# Patient Record
Sex: FEMALE | Race: WHITE | HISPANIC OR LATINO | Employment: UNEMPLOYED | URBAN - METROPOLITAN AREA
[De-identification: names, ages, dates, MRNs, and addresses within clinical notes are randomized per-mention and may not be internally consistent; named-entity substitution may affect disease eponyms.]

---

## 2019-02-15 ENCOUNTER — HOSPITAL ENCOUNTER (EMERGENCY)
Facility: HOSPITAL | Age: 64
Discharge: HOME | End: 2019-02-15
Attending: EMERGENCY MEDICINE
Payer: MEDICARE

## 2019-02-15 VITALS
HEART RATE: 76 BPM | WEIGHT: 162 LBS | OXYGEN SATURATION: 96 % | BODY MASS INDEX: 26.99 KG/M2 | RESPIRATION RATE: 16 BRPM | SYSTOLIC BLOOD PRESSURE: 122 MMHG | DIASTOLIC BLOOD PRESSURE: 80 MMHG | TEMPERATURE: 98 F | HEIGHT: 65 IN

## 2019-02-15 DIAGNOSIS — N30.01 ACUTE CYSTITIS WITH HEMATURIA: Primary | ICD-10-CM

## 2019-02-15 LAB
BACTERIA #/AREA URNS AUTO: ABNORMAL /HPF
BILIRUB UR QL STRIP: NEGATIVE
CLARITY UR REFRACT.AUTO: CLEAR
COLOR UR AUTO: YELLOW
GLUCOSE UR QL STRIP: NEGATIVE
HGB UR QL STRIP: ABNORMAL
KETONES UR QL STRIP: NEGATIVE
LEUKOCYTE ESTERASE UR QL STRIP: ABNORMAL
MICROSCOPIC COMMENT: ABNORMAL
NITRITE UR QL STRIP: NEGATIVE
PH UR STRIP: 6 [PH] (ref 5–8)
PROT UR QL STRIP: NEGATIVE
RBC #/AREA URNS AUTO: 9 /HPF (ref 0–4)
SP GR UR STRIP: 1 (ref 1–1.03)
SQUAMOUS #/AREA URNS AUTO: 0 /HPF
URN SPEC COLLECT METH UR: ABNORMAL
WBC #/AREA URNS AUTO: 33 /HPF (ref 0–5)

## 2019-02-15 PROCEDURE — 99284 EMERGENCY DEPT VISIT MOD MDM: CPT | Mod: ,,, | Performed by: PHYSICIAN ASSISTANT

## 2019-02-15 PROCEDURE — 99284 PR EMERGENCY DEPT VISIT,LEVEL IV: ICD-10-PCS | Mod: ,,, | Performed by: PHYSICIAN ASSISTANT

## 2019-02-15 PROCEDURE — 81001 URINALYSIS AUTO W/SCOPE: CPT

## 2019-02-15 PROCEDURE — 99284 EMERGENCY DEPT VISIT MOD MDM: CPT

## 2019-02-15 PROCEDURE — 87086 URINE CULTURE/COLONY COUNT: CPT

## 2019-02-15 PROCEDURE — 25000003 PHARM REV CODE 250: Performed by: PHYSICIAN ASSISTANT

## 2019-02-15 RX ORDER — CEPHALEXIN 500 MG/1
500 CAPSULE ORAL
Status: COMPLETED | OUTPATIENT
Start: 2019-02-15 | End: 2019-02-15

## 2019-02-15 RX ORDER — CEPHALEXIN 500 MG/1
500 CAPSULE ORAL EVERY 12 HOURS
Qty: 14 CAPSULE | Refills: 0 | Status: SHIPPED | OUTPATIENT
Start: 2019-02-15 | End: 2019-02-22

## 2019-02-15 RX ORDER — PHENAZOPYRIDINE HYDROCHLORIDE 200 MG/1
200 TABLET, FILM COATED ORAL 3 TIMES DAILY
Qty: 21 TABLET | Refills: 0 | Status: SHIPPED | OUTPATIENT
Start: 2019-02-15 | End: 2019-02-22

## 2019-02-15 RX ADMIN — CEPHALEXIN 500 MG: 500 CAPSULE ORAL at 10:02

## 2019-02-16 NOTE — ED TRIAGE NOTES
Celina Azevedo, a 63 y.o. female presents to the ED w/ complaint of dysuria and hematuria that started this afternoon. Pt states she has a hx of dysuria but never really goes to the doctor for it. Pt also complains of pelvic pain and pain in the back medially. Pt denies any fever or chills.     Triage note:  Chief Complaint   Patient presents with    Dysuria     C/o burning with urination and some blood in urine x 1 day. C/o pelvic pain and midline lower back pain     Review of patient's allergies indicates:  No Known Allergies  No past medical history on file.

## 2019-02-16 NOTE — ED PROVIDER NOTES
Encounter Date: 2/15/2019       History     Chief Complaint   Patient presents with    Dysuria     C/o burning with urination and some blood in urine x 1 day. C/o pelvic pain and midline lower back pain     63-year-old female with no significant past medical history presents to the ED with complaints of dysuria.  She reports burning with urination as well as a small amount of blood in her urine since this morning.  Patient's daughter notes that the patient has complained of burning with urination for a prolonged amount of time, but has not been evaluated by healthcare provider.  Patient reports associated pelvic pain and low back pain. She denies nausea, vomiting, fever, chills, or other acute complaints.           Review of patient's allergies indicates:  No Known Allergies  History reviewed. No pertinent past medical history.  Past Surgical History:   Procedure Laterality Date    HYSTERECTOMY       History reviewed. No pertinent family history.  Social History     Tobacco Use    Smoking status: Never Smoker    Smokeless tobacco: Never Used   Substance Use Topics    Alcohol use: No     Frequency: Never    Drug use: No     Review of Systems   Constitutional: Negative for fever.   HENT: Negative for sore throat.    Respiratory: Negative for shortness of breath.    Cardiovascular: Negative for chest pain.   Gastrointestinal: Positive for abdominal pain. Negative for nausea.   Genitourinary: Positive for dysuria. Negative for flank pain.   Musculoskeletal: Positive for back pain.   Skin: Negative for rash.   Neurological: Negative for weakness.   Hematological: Does not bruise/bleed easily.       Physical Exam     Initial Vitals [02/15/19 2023]   BP Pulse Resp Temp SpO2   122/80 76 16 98.3 °F (36.8 °C) 96 %      MAP       --         Physical Exam    Nursing note and vitals reviewed.  Constitutional: She appears well-developed and well-nourished. She is not diaphoretic.  Non-toxic appearance. She does not appear  ill. No distress.   HENT:   Head: Normocephalic and atraumatic.   Neck: Neck supple.   Cardiovascular: Normal rate and regular rhythm. Exam reveals no gallop and no friction rub.    No murmur heard.  Pulmonary/Chest: Effort normal and breath sounds normal. No accessory muscle usage. No tachypnea. No respiratory distress. She has no decreased breath sounds. She has no wheezes. She has no rhonchi. She has no rales.   Abdominal: Soft. She exhibits no distension. There is tenderness in the suprapubic area. There is no CVA tenderness.   Neurological: She is alert.   Skin: No rash noted.   Psychiatric: She has a normal mood and affect. Her behavior is normal.         ED Course   Procedures  Labs Reviewed   URINALYSIS, REFLEX TO URINE CULTURE - Abnormal; Notable for the following components:       Result Value    Specific Gravity, UA 1.000 (*)     Occult Blood UA 3+ (*)     Leukocytes, UA 3+ (*)     All other components within normal limits    Narrative:     Preferred Collection Type->Urine, Clean Catch  yellow and grey   URINALYSIS MICROSCOPIC - Abnormal; Notable for the following components:    RBC, UA 9 (*)     WBC, UA 33 (*)     Bacteria, UA Few (*)     All other components within normal limits    Narrative:     Preferred Collection Type->Urine, Clean Catch  yellow and grey   CULTURE, URINE          Imaging Results    None          Medical Decision Making:   History:   Old Medical Records: I decided to obtain old medical records.  Differential Diagnosis:   My differential diagnosis includes but is not limited to:  Acute Cystitis, interstitial cystitis, pyelonephritis, vaginitis  Clinical Tests:   Lab Tests: Ordered and Reviewed       APC / Resident Notes:   63-year-old female presents with dysuria and hematuria that began today.  Afebrile.  Nontoxic appearing.  Suprapubic tenderness. No CVA tenderness.     UA consistent with infection.  I will treat with Keflex.  First dose given in the ED.  Will also prescribe  Pyridium.  Stable for discharge. Will refer to Internal Medicine for follow-up.                    Clinical Impression:   The encounter diagnosis was Acute cystitis with hematuria.      Disposition:   Disposition: Discharged  Condition: Stable                        Geneva Chavez PA-C  02/16/19 0105

## 2019-02-17 LAB
BACTERIA UR CULT: NORMAL
BACTERIA UR CULT: NORMAL

## 2019-08-07 ENCOUNTER — HOSPITAL ENCOUNTER (EMERGENCY)
Facility: HOSPITAL | Age: 64
Discharge: HOME | End: 2019-08-07
Attending: EMERGENCY MEDICINE
Payer: MEDICARE

## 2019-08-07 VITALS
TEMPERATURE: 98 F | DIASTOLIC BLOOD PRESSURE: 74 MMHG | RESPIRATION RATE: 16 BRPM | BODY MASS INDEX: 27.82 KG/M2 | OXYGEN SATURATION: 99 % | HEIGHT: 65 IN | WEIGHT: 167 LBS | SYSTOLIC BLOOD PRESSURE: 167 MMHG | HEART RATE: 71 BPM

## 2019-08-07 DIAGNOSIS — N39.0 URINARY TRACT INFECTION WITH HEMATURIA, SITE UNSPECIFIED: Primary | ICD-10-CM

## 2019-08-07 DIAGNOSIS — R31.9 URINARY TRACT INFECTION WITH HEMATURIA, SITE UNSPECIFIED: Primary | ICD-10-CM

## 2019-08-07 LAB
BACTERIA #/AREA URNS AUTO: NORMAL /HPF
BILIRUB UR QL STRIP: NEGATIVE
CLARITY UR REFRACT.AUTO: CLEAR
COLOR UR AUTO: ABNORMAL
GLUCOSE UR QL STRIP: NEGATIVE
HGB UR QL STRIP: ABNORMAL
KETONES UR QL STRIP: NEGATIVE
LEUKOCYTE ESTERASE UR QL STRIP: ABNORMAL
MICROSCOPIC COMMENT: NORMAL
NITRITE UR QL STRIP: NEGATIVE
PH UR STRIP: 6 [PH] (ref 5–8)
PROT UR QL STRIP: NEGATIVE
RBC #/AREA URNS AUTO: 1 /HPF (ref 0–4)
SP GR UR STRIP: 1 (ref 1–1.03)
URN SPEC COLLECT METH UR: ABNORMAL
WBC #/AREA URNS AUTO: 5 /HPF (ref 0–5)

## 2019-08-07 PROCEDURE — 25000003 PHARM REV CODE 250: Mod: HCNC | Performed by: PHYSICIAN ASSISTANT

## 2019-08-07 PROCEDURE — 99284 EMERGENCY DEPT VISIT MOD MDM: CPT | Mod: ,,, | Performed by: EMERGENCY MEDICINE

## 2019-08-07 PROCEDURE — 81001 URINALYSIS AUTO W/SCOPE: CPT | Mod: HCNC

## 2019-08-07 PROCEDURE — 99284 PR EMERGENCY DEPT VISIT,LEVEL IV: ICD-10-PCS | Mod: ,,, | Performed by: EMERGENCY MEDICINE

## 2019-08-07 PROCEDURE — 99283 EMERGENCY DEPT VISIT LOW MDM: CPT | Mod: HCNC

## 2019-08-07 RX ORDER — CEPHALEXIN 500 MG/1
500 CAPSULE ORAL 2 TIMES DAILY
Qty: 14 CAPSULE | Refills: 0 | Status: SHIPPED | OUTPATIENT
Start: 2019-08-07 | End: 2019-08-14

## 2019-08-07 RX ORDER — CEPHALEXIN 500 MG/1
500 CAPSULE ORAL
Status: COMPLETED | OUTPATIENT
Start: 2019-08-07 | End: 2019-08-07

## 2019-08-07 RX ADMIN — CEPHALEXIN 500 MG: 500 CAPSULE ORAL at 05:08

## 2019-08-07 NOTE — ED NOTES
Patient identifiers verified and correct for Ms Azevedo  C/C: Pain and burning with urination SEE NN  APPEARANCE: awake and alert in NAD.  SKIN: warm, dry and intact. No breakdown or bruising.  MUSCULOSKELETAL: Patient moving all extremities spontaneously, no obvious swelling or deformities noted. Ambulates independently.  RESPIRATORY: Denies shortness of breath.Respirations unlabored. Denies fevers  CARDIAC: Denies CP, 2+ distal pulses; no peripheral edema  ABDOMEN: S/ND/NT, Denies nausea  : voids spontaneously, pain and burning with uriantion  Neurologic: AAO x 4; follows commands equal strength in all extremities; denies numbness/tingling. Denies dizziness Denies weakness

## 2019-08-07 NOTE — ED PROVIDER NOTES
Encounter Date: 8/7/2019    SCRIBE #1 NOTE: I, Maira Pina, am scribing for, and in the presence of,  Dagmar Jin MD. I have scribed the following portions of the note - the APC attestation.       History     Chief Complaint   Patient presents with    Female  Problem     Pt c/o burning with urination.      63-year-old female with no pertinent PMHx who presents to the ED with c/o dysuria. She reports that since this morning, she has had burning when she urinates, urinary frequency, and slight bright red blood in her urine. She also has suprapubic pain, which she describes as 9/10 when urinating. She states that her symptoms are similar to when she was diagnosed with a UTI in February, for which she was prescribed Keflex with resolution of her symptoms. Denies fevers, chills, vaginal discharge/itching/pain, flank pain, n/v, chest pain, SOB, or any other medical complaints. Denies history of kidney stones.     The history is provided by the patient. The history is limited by a language barrier ([place]ian speaking). A  was used.     Review of patient's allergies indicates:  No Known Allergies  History reviewed. No pertinent past medical history.  Past Surgical History:   Procedure Laterality Date    HYSTERECTOMY       History reviewed. No pertinent family history.  Social History     Tobacco Use    Smoking status: Never Smoker    Smokeless tobacco: Never Used   Substance Use Topics    Alcohol use: No     Frequency: Never    Drug use: No     Review of Systems   Constitutional: Negative for chills, fatigue and fever.   HENT: Negative for congestion.    Eyes: Negative for visual disturbance.   Respiratory: Negative for shortness of breath.    Cardiovascular: Negative for chest pain and palpitations.   Gastrointestinal: Negative for abdominal pain, constipation, diarrhea, nausea and vomiting.   Genitourinary: Positive for dysuria, frequency, hematuria and pelvic pain (suprapubic). Negative  for flank pain, vaginal bleeding, vaginal discharge and vaginal pain.   Musculoskeletal: Negative for back pain and neck pain.   Skin: Negative for rash.   Neurological: Negative for dizziness, weakness, numbness and headaches.   Hematological: Does not bruise/bleed easily.   Psychiatric/Behavioral: Negative for confusion.       Physical Exam     Initial Vitals [08/07/19 1403]   BP Pulse Resp Temp SpO2   (!) 151/80 80 16 99.6 °F (37.6 °C) 98 %      MAP       --         Physical Exam    Nursing note and vitals reviewed.  Constitutional: She appears well-developed and well-nourished.   HENT:   Head: Normocephalic and atraumatic.   Eyes: Conjunctivae and EOM are normal.   Neck: Normal range of motion.   Cardiovascular: Normal rate, regular rhythm and normal heart sounds.   Pulmonary/Chest: Breath sounds normal. She has no wheezes. She has no rhonchi. She has no rales.   Abdominal: Soft. There is tenderness. There is no rebound and no guarding.   Mild suprapubic tenderness to palpation. No CVA tenderness.    Musculoskeletal: Normal range of motion. She exhibits no edema or tenderness.   Neurological: She is alert and oriented to person, place, and time.   Skin: Skin is warm.   Psychiatric: She has a normal mood and affect.         ED Course   Procedures  Labs Reviewed   URINALYSIS, REFLEX TO URINE CULTURE - Abnormal; Notable for the following components:       Result Value    Specific Gravity, UA 1.000 (*)     Occult Blood UA 3+ (*)     Leukocytes, UA 3+ (*)     All other components within normal limits    Narrative:     Preferred Collection Type->Urine, Clean Catch   URINALYSIS MICROSCOPIC    Narrative:     Preferred Collection Type->Urine, Clean Catch          Imaging Results    None          Medical Decision Making:   History:   Old Medical Records: I decided to obtain old medical records.  Old Records Summarized: records from clinic visits.       <> Summary of Records: Pt seen in ED in Feb 2019 treated for UTI with  keflex. Urine cx positive for multiple organisms, none in predominance.   Initial Assessment:   63-year-old female with no pertinent PMHx who presents to the ED with c/o dysuria, urinary frequency, and hematuria. Symptoms similar to previous UTI. Afebrile. Slightly hypertensive with /80. RRR. Lungs CTA bilaterally. Mild suprapubic tenderness to palpation. No CVA tenderness. Neurovascularly intact throughout.   Clinical Tests:   Lab Tests: Ordered and Reviewed  ED Management:  I suspect patient has a recurrent UTI, will check UA. I do not suspect urosepsis or nephrolithiasis as patient is no acute distress, vital signs are stable, and she has no CVA tenderness on exam.     UA with 3+ occult blood, 3+ leukocytes, and negative nitrites. Microscopy with 5 WBC, 1 RBC, and rare bacteria.     Reviewed results with patient. Will treated UTI with Keflex as patient had a good response to this medication in the past. Pt is not currently followed by a primary care physician, will place ambulatory referral to [facility] (preferabbly [place] speaking). Strict ER return precautions given. All questions answered. Pt expressed understanding and is agreeable with the plan.     I have discussed the treatment and management of this patient with my supervising physician, and we agree on the plan of care.      Brenda Dobbs PA-C              Scribe Attestation:   Scribe #1: I performed the above scribed service and the documentation accurately describes the services I performed. I attest to the accuracy of the note.    Attending Attestation:     Physician Attestation Statement for NP/PA:   I discussed this assessment and plan of this patient with the NP/PA, but I did not personally examine the patient. The face to face encounter was performed by the NP/PA.                     Clinical Impression:       ICD-10-CM ICD-9-CM   1. Urinary tract infection with hematuria, site unspecified N39.0 599.0    R31.9 599.70         Disposition:    Disposition: Discharged  Condition: Stable                        Brenda Dobbs PA-C  08/08/19 1200

## 2019-08-07 NOTE — DISCHARGE INSTRUCTIONS
You had early signs of infection in your urine. You should take Keflex for treatment. Please establish care with a primary care physician to have this followed. Return if your symptoms worsen or if you develop any other concerning symptoms.

## 2019-09-24 ENCOUNTER — HOSPITAL ENCOUNTER (EMERGENCY)
Facility: HOSPITAL | Age: 64
Discharge: HOME | End: 2019-09-24
Attending: EMERGENCY MEDICINE
Payer: MEDICARE

## 2019-09-24 VITALS
RESPIRATION RATE: 18 BRPM | SYSTOLIC BLOOD PRESSURE: 149 MMHG | OXYGEN SATURATION: 98 % | BODY MASS INDEX: 27.99 KG/M2 | TEMPERATURE: 98 F | HEIGHT: 65 IN | HEART RATE: 72 BPM | DIASTOLIC BLOOD PRESSURE: 71 MMHG | WEIGHT: 168 LBS

## 2019-09-24 DIAGNOSIS — R19.7 DIARRHEA, UNSPECIFIED TYPE: Primary | ICD-10-CM

## 2019-09-24 PROCEDURE — 99284 EMERGENCY DEPT VISIT MOD MDM: CPT | Mod: HCNC,,, | Performed by: EMERGENCY MEDICINE

## 2019-09-24 PROCEDURE — 99284 PR EMERGENCY DEPT VISIT,LEVEL IV: ICD-10-PCS | Mod: HCNC,,, | Performed by: EMERGENCY MEDICINE

## 2019-09-24 PROCEDURE — 99283 EMERGENCY DEPT VISIT LOW MDM: CPT | Mod: HCNC

## 2019-09-24 RX ORDER — CIPROFLOXACIN 500 MG/1
500 TABLET ORAL 2 TIMES DAILY
Qty: 6 TABLET | Refills: 0 | Status: SHIPPED | OUTPATIENT
Start: 2019-09-24 | End: 2019-09-27

## 2019-09-24 NOTE — ED NOTES
Patient identifiers verified and correct for Ms Azevedo  C/C: Diarrhea SEE NN  APPEARANCE: awake and alert in NAD.  SKIN: warm, dry and intact. No breakdown or bruising.  MUSCULOSKELETAL: Patient moving all extremities spontaneously, no obvious swelling or deformities noted. Ambulates independently.  RESPIRATORY: Denies shortness of breath.Respirations unlabored. Denies fevers  CARDIAC: Denies CP, 2+ distal pulses; no peripheral edema  ABDOMEN: Abdomen soft, positive diarrhea, denies nausea, denies vomiting, positive diarrhea Denies blood in stool  : voids spontaneously, denies difficulty  Neurologic: AAO x 4; follows commands equal strength in all extremities; denies numbness/tingling. Denies dizziness Denies weakness

## 2019-09-24 NOTE — ED PROVIDER NOTES
Encounter Date: 2019       History     Chief Complaint   Patient presents with    Diarrhea     4d after eating chicken salad     63-year-old healthy female complaining of diarrhea x4 days, 3 to 4 times a day, watery, nonbloody.  Symptoms started after eating chicken salad that day throat was not  and did not taste funny.  No recent travel, antibiotics, sick contacts.  No fevers, chills, vomiting. No abdominal pain.    The history is provided by the patient.     Review of patient's allergies indicates:  No Known Allergies  History reviewed. No pertinent past medical history.  Past Surgical History:   Procedure Laterality Date    HYSTERECTOMY       History reviewed. No pertinent family history.  Social History     Tobacco Use    Smoking status: Never Smoker    Smokeless tobacco: Never Used   Substance Use Topics    Alcohol use: No     Frequency: Never    Drug use: No     Review of Systems   Constitutional: Positive for fatigue. Negative for chills and fever.   Gastrointestinal: Positive for diarrhea. Negative for abdominal pain and blood in stool.       Physical Exam     Initial Vitals [19 1046]   BP Pulse Resp Temp SpO2   (!) 149/71 72 18 97.9 °F (36.6 °C) 98 %      MAP       --         Physical Exam    Nursing note and vitals reviewed.  Constitutional: She appears well-developed and well-nourished. She is not diaphoretic. No distress.   HENT:   Mouth/Throat: Oropharynx is clear and moist.   Eyes: Conjunctivae are normal.   Neck: Neck supple.   Pulmonary/Chest: No respiratory distress.   Abdominal: Soft. She exhibits no distension. There is no tenderness. There is no rebound and no guarding.   Neurological: She is alert.   Skin: Skin is warm and dry. No pallor.         ED Course   Procedures  Labs Reviewed - No data to display       Imaging Results    None          Medical Decision Making:   Initial Assessment:   63-year-old healthy female here with diarrhea x4 days    She is well-appearing  with normal vital signs and her mucous membranes are moist  Abdomen is completely benign  Differential Diagnosis:   There are no red flags to add suggest an infectious diarrhea at this time.   is here with the same symptoms that started 3 days after the patient's which strongly suggests a viral etiology.  Patient appears very well hydrated and is still able to tolerate p.o. liquids though she has decreased appetite.  ED Management:  Long discussion with patient and her  about my suspicion that this is a viral infection and that will likely resolve with more time and that the most important thing is for them to stay well hydrated. Both are requesting antibiotic therapy and I have suggested that they be given a prescription today but hold on to it and only take it if the symptoms are persisting or worsening over the next 2-3 days.  Patient does not have a PCP and was strongly advised to get an appointment with 1.  Given the patient's overall very well appearance I do not think that lab work and/or stool cultures are necessary at this time.                      Clinical Impression:       ICD-10-CM ICD-9-CM   1. Diarrhea, unspecified type R19.7 787.91                                Fadumo Torres MD  09/25/19 0914

## 2020-10-21 ENCOUNTER — OFFICE VISIT (OUTPATIENT)
Dept: URGENT CARE | Facility: CLINIC | Age: 65
End: 2020-10-21
Payer: MEDICARE

## 2020-10-21 VITALS
HEART RATE: 71 BPM | RESPIRATION RATE: 18 BRPM | SYSTOLIC BLOOD PRESSURE: 156 MMHG | HEIGHT: 65 IN | WEIGHT: 168 LBS | DIASTOLIC BLOOD PRESSURE: 84 MMHG | OXYGEN SATURATION: 98 % | BODY MASS INDEX: 27.99 KG/M2 | TEMPERATURE: 98 F

## 2020-10-21 DIAGNOSIS — S83.92XA SPRAIN OF LEFT KNEE, UNSPECIFIED LIGAMENT, INITIAL ENCOUNTER: Primary | ICD-10-CM

## 2020-10-21 PROCEDURE — 73562 XR KNEE 3 VIEW LEFT: ICD-10-PCS | Mod: FY,LT,S$GLB, | Performed by: RADIOLOGY

## 2020-10-21 PROCEDURE — 99203 PR OFFICE/OUTPT VISIT, NEW, LEVL III, 30-44 MIN: ICD-10-PCS | Mod: S$GLB,,, | Performed by: PHYSICIAN ASSISTANT

## 2020-10-21 PROCEDURE — 73562 X-RAY EXAM OF KNEE 3: CPT | Mod: FY,LT,S$GLB, | Performed by: RADIOLOGY

## 2020-10-21 PROCEDURE — 99203 OFFICE O/P NEW LOW 30 MIN: CPT | Mod: S$GLB,,, | Performed by: PHYSICIAN ASSISTANT

## 2020-10-21 RX ORDER — NAPROXEN 500 MG/1
500 TABLET ORAL 2 TIMES DAILY
Qty: 20 TABLET | Refills: 0 | Status: SHIPPED | OUTPATIENT
Start: 2020-10-21 | End: 2020-10-31

## 2020-10-21 NOTE — PATIENT INSTRUCTIONS
-Take naproxen as needed for pain.  Call [phone] to schedule an appointment with orthopedics. A referral has been placed in your chart.    Please follow up with your primary care provider within 2-5 days if your signs and symptoms have not resolved or worsen.     If your condition worsens or fails to improve we recommend that you receive another evaluation at the emergency room immediately or contact your primary medical clinic to discuss your concerns.   You must understand that you have received an Urgent Care treatment only and that you may be released before all of your medical problems are known or treated. You, the patient, will arrange for follow up care as instructed.         Knee Pain of Uncertain Cause    There are several common causes for knee pain. These can include:  · A sprain of the ligaments that support the joint  · An injury to the cartilage lining of the joint  · Arthritis from wear-and-tear or inflammation  There are other causes as well. There may also be swelling, reduced movement of the knee joint, and pain with walking. A definite diagnosis will still need to be made. If your symptoms do not improve, further follow-up and testing may be needed.  Home care  · Stay off the injured leg as much as possible until pain improves.  · Apply an ice pack over the injured area for 15 to 20 minutes every 3 to 6 hours. You should do this for the first 24 to 48 hours. You can make an ice pack by filling a plastic bag that seals at the top with ice cubes and then wrapping it with a thin towel. Continue to use ice packs for relief of pain and swelling as needed. As the ice melts, be careful to avoid getting your wrap, splint, or cast wet. After 48 hours, apply heat (warm shower or warm bath) for 15 to 20 minutes several times a day, or alternate ice and heat. If you have to wear a hook-and-loop knee brace, you can open it to apply the ice pack, or heat, directly to the knee. Never put ice directly on  the skin. Always wrap the ice in a towel or other type of cloth.  · You may use over-the-counter pain medicine to control pain, unless another pain medicine was prescribed. If you have chronic liver or kidney disease or ever had a stomach ulcer or GI bleeding, talk with your healthcare provider using these medicines.  · If crutches or a walker have been recommended, do not put weight on the injured leg until you can do so without pain. Check with your healthcare provider before returning to sports or full work duties.  · If you have a hook-and-loop knee brace, you can remove it to bathe and sleep, unless told otherwise.  Follow-up care  Follow up with your healthcare provider as advised. This is usually within 1-2 weeks.  If X-rays were taken, you will be told of any new findings that may affect your care.  Call 911  Call 911 if you have:  · Shortness of breath  · Chest pain  When to seek medical advice  Call your healthcare provider right away if any of these occur:  · Toes or foot becomes swollen, cold, blue, numb, or tingly  · Pain or swelling spreads over the knee or calf  · Warmth or redness appears over the knee or calf  · Other joints become painful  · Rash appears  · Fever of 100.4°F (38°C) or above lasting for 24 to 48 hours  Date Last Reviewed: 11/23/2015  © 8644-8112 [employer]. [address] [place] [address], [place] [address]. All rights reserved. This information is not intended as a substitute for professional medical care. Always follow your healthcare professional's instructions.

## 2020-10-21 NOTE — PROGRESS NOTES
"Subjective:       Patient ID: Celina Azevedo is a 64 y.o. female.    Vitals:  height is 5' 5" (1.651 m) and weight is 76.2 kg (168 lb). Her temperature is 97.5 °F (36.4 °C). Her blood pressure is 156/84 (abnormal) and her pulse is 71. Her respiration is 18 and oxygen saturation is 98%.     Chief Complaint: Knee Pain    Patient reports left knee pain for two weeks. Patient reports standing up the wrong way and twisting her knee.     Knee Pain   The incident occurred more than 1 week ago. The incident occurred at home. The pain is present in the left knee. The pain is at a severity of 8/10. The pain is moderate. The pain has been constant since onset.       Constitution: Negative for fatigue.   HENT: Negative for facial swelling and facial trauma.    Neck: Negative for neck stiffness.   Cardiovascular: Negative for chest trauma.   Eyes: Negative for eye trauma, double vision and blurred vision.   Gastrointestinal: Negative for abdominal trauma, abdominal pain and rectal bleeding.   Genitourinary: Negative for hematuria, missed menses, genital trauma and pelvic pain.   Musculoskeletal: Positive for pain. Negative for trauma, joint swelling and abnormal ROM of joint.   Skin: Negative for color change, wound, abrasion, laceration and bruising.   Neurological: Negative for dizziness, history of vertigo, light-headedness, coordination disturbances, altered mental status and loss of consciousness.   Hematologic/Lymphatic: Negative for history of bleeding disorder.   Psychiatric/Behavioral: Negative for altered mental status.       Objective:      Physical Exam   Constitutional: She is oriented to person, place, and time. She appears well-developed. She is cooperative.  Non-toxic appearance. She does not appear ill. No distress.   HENT:   Head: Normocephalic and atraumatic.   Ears:   Right Ear: External ear normal.   Left Ear: External ear normal.   Nose: Nose normal.   Mouth/Throat: Oropharynx is clear and moist.   Eyes: " Conjunctivae, EOM and lids are normal. Right eye exhibits no discharge. Left eye exhibits no discharge. No scleral icterus.   Neck: Trachea normal, normal range of motion, full passive range of motion without pain and phonation normal. Neck supple.   Cardiovascular: Normal rate, regular rhythm and normal heart sounds. Exam reveals no gallop.   No murmur heard.  Pulmonary/Chest: Effort normal and breath sounds normal. No respiratory distress. She has no decreased breath sounds. She has no wheezes. She has no rhonchi. She has no rales.   Musculoskeletal:         General: No deformity.      Left knee: She exhibits normal range of motion, no swelling, no effusion, no ecchymosis, no deformity, no laceration, no erythema, normal alignment, no LCL laxity, normal patellar mobility, no bony tenderness, normal meniscus and no MCL laxity. Tenderness found. Medial joint line tenderness noted.   Neurological: She is alert and oriented to person, place, and time. She has normal motor skills and normal sensation. Coordination normal.   Skin: Skin is warm, dry, intact, not diaphoretic and not pale. not left kneePsychiatric: Her speech is normal and behavior is normal. Judgment and thought content normal.   Nursing note and vitals reviewed.        Assessment:       1. Sprain of left knee, unspecified ligament, initial encounter        Xr Knee 3 View Left    Result Date: 10/21/2020  EXAMINATION: XR KNEE 3 VIEW LEFT CLINICAL HISTORY: Unspecified injury of left lower leg, initial encounter TECHNIQUE: AP radiograph of the bilateral knees, lateral, and Merchant views of the left knee were performed. COMPARISON: None FINDINGS: There is no acute fracture or dislocation of the left knee.  There are prominent marginal osteophytes along the lateral compartment, and to a lesser extent the medial and patellofemoral compartments.  There is a small effusion.     No acute osseous abnormality of the left knee. Electronically signed by: Guillermo  Diane Date:    10/21/2020 Time:    13:07    Plan:        used - [id number].    Sprain of left knee, unspecified ligament, initial encounter  -     XR KNEE 3 VIEW LEFT; Future; Expected date: 10/21/2020  -     naproxen (NAPROSYN) 500 MG tablet; Take 1 tablet (500 mg total) by mouth 2 (two) times daily. for 10 days  Dispense: 20 tablet; Refill: 0  -     Ambulatory referral/consult to Orthopedics      Patient Instructions   -Take naproxen as needed for pain.  Call [phone] to schedule an appointment with orthopedics. A referral has been placed in your chart.    Please follow up with your primary care provider within 2-5 days if your signs and symptoms have not resolved or worsen.     If your condition worsens or fails to improve we recommend that you receive another evaluation at the emergency room immediately or contact your primary medical clinic to discuss your concerns.   You must understand that you have received an Urgent Care treatment only and that you may be released before all of your medical problems are known or treated. You, the patient, will arrange for follow up care as instructed.         Knee Pain of Uncertain Cause    There are several common causes for knee pain. These can include:  · A sprain of the ligaments that support the joint  · An injury to the cartilage lining of the joint  · Arthritis from wear-and-tear or inflammation  There are other causes as well. There may also be swelling, reduced movement of the knee joint, and pain with walking. A definite diagnosis will still need to be made. If your symptoms do not improve, further follow-up and testing may be needed.  Home care  · Stay off the injured leg as much as possible until pain improves.  · Apply an ice pack over the injured area for 15 to 20 minutes every 3 to 6 hours. You should do this for the first 24 to 48 hours. You can make an ice pack by filling a plastic bag that seals at the top with ice cubes and then wrapping it  with a thin towel. Continue to use ice packs for relief of pain and swelling as needed. As the ice melts, be careful to avoid getting your wrap, splint, or cast wet. After 48 hours, apply heat (warm shower or warm bath) for 15 to 20 minutes several times a day, or alternate ice and heat. If you have to wear a hook-and-loop knee brace, you can open it to apply the ice pack, or heat, directly to the knee. Never put ice directly on the skin. Always wrap the ice in a towel or other type of cloth.  · You may use over-the-counter pain medicine to control pain, unless another pain medicine was prescribed. If you have chronic liver or kidney disease or ever had a stomach ulcer or GI bleeding, talk with your healthcare provider using these medicines.  · If crutches or a walker have been recommended, do not put weight on the injured leg until you can do so without pain. Check with your healthcare provider before returning to sports or full work duties.  · If you have a hook-and-loop knee brace, you can remove it to bathe and sleep, unless told otherwise.  Follow-up care  Follow up with your healthcare provider as advised. This is usually within 1-2 weeks.  If X-rays were taken, you will be told of any new findings that may affect your care.  Call 911  Call 911 if you have:  · Shortness of breath  · Chest pain  When to seek medical advice  Call your healthcare provider right away if any of these occur:  · Toes or foot becomes swollen, cold, blue, numb, or tingly  · Pain or swelling spreads over the knee or calf  · Warmth or redness appears over the knee or calf  · Other joints become painful  · Rash appears  · Fever of 100.4°F (38°C) or above lasting for 24 to 48 hours  Date Last Reviewed: 11/23/2015  © 2643-8826 [employer]. [address] [place] [address], [place] [address]. All rights reserved. This information is not intended as a substitute for professional medical care. Always follow your healthcare professional's  instructions.

## 2020-10-23 ENCOUNTER — OFFICE VISIT (OUTPATIENT)
Dept: ORTHOPEDICS | Facility: CLINIC | Age: 65
End: 2020-10-23
Payer: MEDICARE

## 2020-10-23 VITALS — WEIGHT: 167.88 LBS | HEIGHT: 65 IN | BODY MASS INDEX: 27.97 KG/M2

## 2020-10-23 DIAGNOSIS — M17.12 PRIMARY OSTEOARTHRITIS OF LEFT KNEE: Primary | ICD-10-CM

## 2020-10-23 PROCEDURE — 99203 OFFICE O/P NEW LOW 30 MIN: CPT | Mod: 25,HCNC,S$GLB, | Performed by: ORTHOPAEDIC SURGERY

## 2020-10-23 PROCEDURE — 3008F BODY MASS INDEX DOCD: CPT | Mod: HCNC,CPTII,S$GLB, | Performed by: ORTHOPAEDIC SURGERY

## 2020-10-23 PROCEDURE — 1100F PR PT FALLS ASSESS DOC 2+ FALLS/FALL W/INJURY/YR: ICD-10-PCS | Mod: HCNC,CPTII,S$GLB, | Performed by: ORTHOPAEDIC SURGERY

## 2020-10-23 PROCEDURE — 1159F PR MEDICATION LIST DOCUMENTED IN MEDICAL RECORD: ICD-10-PCS | Mod: HCNC,S$GLB,, | Performed by: ORTHOPAEDIC SURGERY

## 2020-10-23 PROCEDURE — 20610 DRAIN/INJ JOINT/BURSA W/O US: CPT | Mod: HCNC,LT,S$GLB, | Performed by: ORTHOPAEDIC SURGERY

## 2020-10-23 PROCEDURE — 99203 PR OFFICE/OUTPT VISIT, NEW, LEVL III, 30-44 MIN: ICD-10-PCS | Mod: 25,HCNC,S$GLB, | Performed by: ORTHOPAEDIC SURGERY

## 2020-10-23 PROCEDURE — 99999 PR PBB SHADOW E&M-EST. PATIENT-LVL II: ICD-10-PCS | Mod: PBBFAC,HCNC,, | Performed by: ORTHOPAEDIC SURGERY

## 2020-10-23 PROCEDURE — 1125F PR PAIN SEVERITY QUANTIFIED, PAIN PRESENT: ICD-10-PCS | Mod: HCNC,S$GLB,, | Performed by: ORTHOPAEDIC SURGERY

## 2020-10-23 PROCEDURE — 3288F PR FALLS RISK ASSESSMENT DOCUMENTED: ICD-10-PCS | Mod: HCNC,CPTII,S$GLB, | Performed by: ORTHOPAEDIC SURGERY

## 2020-10-23 PROCEDURE — 3008F PR BODY MASS INDEX (BMI) DOCUMENTED: ICD-10-PCS | Mod: HCNC,CPTII,S$GLB, | Performed by: ORTHOPAEDIC SURGERY

## 2020-10-23 PROCEDURE — 1159F MED LIST DOCD IN RCRD: CPT | Mod: HCNC,S$GLB,, | Performed by: ORTHOPAEDIC SURGERY

## 2020-10-23 PROCEDURE — 1100F PTFALLS ASSESS-DOCD GE2>/YR: CPT | Mod: HCNC,CPTII,S$GLB, | Performed by: ORTHOPAEDIC SURGERY

## 2020-10-23 PROCEDURE — 3288F FALL RISK ASSESSMENT DOCD: CPT | Mod: HCNC,CPTII,S$GLB, | Performed by: ORTHOPAEDIC SURGERY

## 2020-10-23 PROCEDURE — 99999 PR PBB SHADOW E&M-EST. PATIENT-LVL II: CPT | Mod: PBBFAC,HCNC,, | Performed by: ORTHOPAEDIC SURGERY

## 2020-10-23 PROCEDURE — 1125F AMNT PAIN NOTED PAIN PRSNT: CPT | Mod: HCNC,S$GLB,, | Performed by: ORTHOPAEDIC SURGERY

## 2020-10-23 PROCEDURE — 20610 LARGE JOINT ASPIRATION/INJECTION: L KNEE: ICD-10-PCS | Mod: HCNC,LT,S$GLB, | Performed by: ORTHOPAEDIC SURGERY

## 2020-10-23 RX ORDER — IBUPROFEN 800 MG/1
800 TABLET ORAL EVERY 6 HOURS PRN
Status: ON HOLD | COMMUNITY
End: 2022-04-01 | Stop reason: HOSPADM

## 2020-10-23 RX ADMIN — TRIAMCINOLONE ACETONIDE 40 MG: 40 INJECTION, SUSPENSION INTRA-ARTICULAR; INTRAMUSCULAR at 02:10

## 2020-10-23 NOTE — LETTER
October 24, 2020      Kamryn Braun PA-C  [address]  [facility] [address]           [facility] Orthoped[facility] 5th Fl  [address]LO[address]  [facility] [address]  Phone: [phone]          Patient: Celina Azevedo   MR Number: [id number]   YOB: 1955   Date of Visit: 10/23/2020       Dear Kamryn Braun:    Thank you for referring Celina Azevedo to me for evaluation. Attached you will find relevant portions of my assessment and plan of care.    If you have questions, please do not hesitate to call me. I look forward to following Celina Azevedo along with you.    Sincerely,    Kermit Torrez MD    Enclosure  CC:  No Recipients    If you would like to receive this communication electronically, please contact [email or web address] or [phone] to request more information on [employer] access.    For providers and/or their staff who would like to refer a patient to [facility], please contact us through our one-stop-shop provider referral line, [facility], at [phone].    If you feel you have received this communication in error or would no longer like to receive these types of communications, please e-mail [email or web address]

## 2020-10-23 NOTE — PROGRESS NOTES
Subjective:      Patient ID: Celina Azevedo is a 64 y.o. female.    Chief Complaint: Pain of the Left Knee    HPI    Celina Azevedo is a 64 y.o. female who presents to clinic for evaluation of left knee pain x 2 weeks. Pt reports 2 weeks ago she fell directly onto her left knee. Reports severe lateral knee pain and associated swelling. She has been able to bear weight on LLE since the fall. She reports similar pain ~ 8 years ago, she was given an intrarticular CSI at that time with 100% resolution. She has been taking ibuprofen with minimal relief.     History reviewed. No pertinent past medical history.  Past Surgical History:   Procedure Laterality Date    HYSTERECTOMY       History reviewed. No pertinent family history.  Social History     Socioeconomic History    Marital status:      Spouse name: Not on file    Number of children: Not on file    Years of education: Not on file    Highest education level: Not on file   Occupational History    Not on file   Social Needs    Financial resource strain: Not on file    Food insecurity     Worry: Not on file     Inability: Not on file    Transportation needs     Medical: Not on file     Non-medical: Not on file   Tobacco Use    Smoking status: Never Smoker    Smokeless tobacco: Never Used   Substance and Sexual Activity    Alcohol use: No     Frequency: Never    Drug use: No    Sexual activity: Not on file   Lifestyle    Physical activity     Days per week: Not on file     Minutes per session: Not on file    Stress: Not on file   Relationships    Social connections     Talks on phone: Not on file     Gets together: Not on file     Attends Muslim service: Not on file     Active member of club or organization: Not on file     Attends meetings of clubs or organizations: Not on file     Relationship status: Not on file   Other Topics Concern    Not on file   Social History Narrative    Not on file     Current Outpatient Medications on File  "Prior to Visit   Medication Sig Dispense Refill    ibuprofen (ADVIL,MOTRIN) 800 MG tablet Take 800 mg by mouth every 6 (six) hours as needed for Pain.      naproxen (NAPROSYN) 500 MG tablet Take 1 tablet (500 mg total) by mouth 2 (two) times daily. for 10 days 20 tablet 0     No current facility-administered medications on file prior to visit.      Review of patient's allergies indicates:  No Known Allergies    Review of Systems   Constitution: Negative for chills, fever and night sweats.   HENT: Negative for hearing loss.    Eyes: Negative for blurred vision and double vision.   Cardiovascular: Negative for chest pain, claudication and leg swelling.   Respiratory: Negative for shortness of breath.    Endocrine: Negative for polydipsia, polyphagia and polyuria.   Hematologic/Lymphatic: Negative for adenopathy and bleeding problem. Does not bruise/bleed easily.   Skin: Negative for poor wound healing.   Gastrointestinal: Negative for diarrhea and heartburn.   Genitourinary: Negative for bladder incontinence.   Neurological: Negative for focal weakness, headaches, numbness, paresthesias and sensory change.   Psychiatric/Behavioral: The patient is not nervous/anxious.    Allergic/Immunologic: Negative for persistent infections.         Objective:      Body mass index is 27.94 kg/m².  Vitals:    10/23/20 1443   Weight: 76.1 kg (167 lb 14.1 oz)   Height: 5' 5" (1.651 m)         General    Constitutional: She is oriented to person, place, and time. She appears well-developed and well-nourished.   HENT:   Head: Normocephalic and atraumatic.   Eyes: EOM are normal.   Cardiovascular: Normal rate.    Pulmonary/Chest: Effort normal.   Neurological: She is alert and oriented to person, place, and time.   Psychiatric: She has a normal mood and affect. Her behavior is normal.     General Musculoskeletal Exam   Gait: normal       Right Knee Exam     Inspection   Erythema: absent  Scars: absent  Swelling: absent  Effusion: " absent  Deformity: absent  Bruising: absent    Tenderness   The patient is experiencing no tenderness.     Range of Motion   Extension: 0   Flexion: 130     Tests   Ligament Examination Lachman: normal (-1 to 2mm)   MCL - Valgus: normal (0 to 2mm)  LCL - Varus: normal  Patella   Passive Patellar Tilt: neutral    Other   Sensation: normal    Left Knee Exam     Inspection   Erythema: absent  Scars: absent  Swelling: absent  Effusion: present  Deformity: absent  Bruising: present (medial knee bruising)    Tenderness   The patient tender to palpation of the lateral joint line.    Range of Motion   Extension: 0   Flexion: 120     Tests   Stability Lachman: normal (-1 to 2mm)   MCL - Valgus: normal (0 to 2mm)  LCL - Varus: normal (0 to 2mm)  Patella   Passive Patellar Tilt: neutral    Other   Sensation: normal    Muscle Strength   Right Lower Extremity   Hip Abduction: 5/5   Quadriceps:  5/5   Hamstrin/5   Left Lower Extremity   Hip Abduction: 5/5   Quadriceps:  5/5   Hamstrin/5     Reflexes     Left Side  Quadriceps:  2+    Right Side   Quadriceps:  2+    Vascular Exam     Right Pulses  Dorsalis Pedis:      2+          Left Pulses  Dorsalis Pedis:      2+          Edema  Right Lower Leg: absent  Left Lower Leg: absent      Radiographs taken yesterday and reviewed by me demonstrate moderate arthritic change of the bilateral KNEE(s).There  is not bone destruction.  There is not a fracture. The lateral compartment is most involved. The changes are tricompartmental.          Assessment:       Encounter Diagnosis   Name Primary?    Primary osteoarthritis of left knee Yes          Plan:       Celina was seen today for pain.    Diagnoses and all orders for this visit:    Primary osteoarthritis of left knee      Discussed with patient natural hx and nonop/op txt of knee OA including NSAIDs, PT, intrarticular CSI/HA injections, and TKA. Counseled pt on avoiding high impact activities. Pt would like to proceed with L  knee CSI today. RTC if not improvement in sxs.

## 2020-10-24 RX ORDER — TRIAMCINOLONE ACETONIDE 40 MG/ML
40 INJECTION, SUSPENSION INTRA-ARTICULAR; INTRAMUSCULAR
Status: DISCONTINUED | OUTPATIENT
Start: 2020-10-23 | End: 2020-10-24 | Stop reason: HOSPADM

## 2020-10-24 NOTE — PROCEDURES
Large Joint Aspiration/Injection: L knee    Date/Time: 10/23/2020 2:30 PM  Performed by: Kermit Torrez MD  Authorized by: Kermit Torrez MD     Consent Done?:  Yes (Verbal)  Indications:  Pain  Site marked: the procedure site was marked    Timeout: prior to procedure the correct patient, procedure, and site was verified    Prep: patient was prepped and draped in usual sterile fashion      Local anesthesia used?: Yes    Local anesthetic:  Lidocaine 1% without epinephrine  Anesthetic total (ml):  5      Details:  Needle Size:  21 G  Approach:  Anterolateral  Location:  Knee  Site:  L knee  Medications:  40 mg triamcinolone acetonide 40 mg/mL  Patient tolerance:  Patient tolerated the procedure well with no immediate complications

## 2021-02-18 ENCOUNTER — OFFICE VISIT (OUTPATIENT)
Dept: ORTHOPEDICS | Facility: CLINIC | Age: 66
End: 2021-02-18
Payer: MEDICARE

## 2021-02-18 ENCOUNTER — IMMUNIZATION (OUTPATIENT)
Dept: INTERNAL MEDICINE | Facility: CLINIC | Age: 66
End: 2021-02-18
Payer: MEDICARE

## 2021-02-18 VITALS — BODY MASS INDEX: 27.66 KG/M2 | HEIGHT: 65 IN | WEIGHT: 166 LBS

## 2021-02-18 DIAGNOSIS — M17.12 PRIMARY OSTEOARTHRITIS OF LEFT KNEE: Primary | ICD-10-CM

## 2021-02-18 DIAGNOSIS — Z23 NEED FOR VACCINATION: Primary | ICD-10-CM

## 2021-02-18 PROCEDURE — 3288F FALL RISK ASSESSMENT DOCD: CPT | Mod: CPTII,S$GLB,, | Performed by: ORTHOPAEDIC SURGERY

## 2021-02-18 PROCEDURE — 99999 PR PBB SHADOW E&M-EST. PATIENT-LVL III: ICD-10-PCS | Mod: PBBFAC,,, | Performed by: ORTHOPAEDIC SURGERY

## 2021-02-18 PROCEDURE — 1125F AMNT PAIN NOTED PAIN PRSNT: CPT | Mod: S$GLB,,, | Performed by: ORTHOPAEDIC SURGERY

## 2021-02-18 PROCEDURE — 1125F PR PAIN SEVERITY QUANTIFIED, PAIN PRESENT: ICD-10-PCS | Mod: S$GLB,,, | Performed by: ORTHOPAEDIC SURGERY

## 2021-02-18 PROCEDURE — 0001A COVID-19, MRNA, LNP-S, PF, 30 MCG/0.3 ML DOSE VACCINE: ICD-10-PCS | Mod: CV19,,, | Performed by: INTERNAL MEDICINE

## 2021-02-18 PROCEDURE — 1101F PT FALLS ASSESS-DOCD LE1/YR: CPT | Mod: CPTII,S$GLB,, | Performed by: ORTHOPAEDIC SURGERY

## 2021-02-18 PROCEDURE — 99999 PR PBB SHADOW E&M-EST. PATIENT-LVL III: CPT | Mod: PBBFAC,,, | Performed by: ORTHOPAEDIC SURGERY

## 2021-02-18 PROCEDURE — 91300 COVID-19, MRNA, LNP-S, PF, 30 MCG/0.3 ML DOSE VACCINE: CPT | Mod: ,,, | Performed by: INTERNAL MEDICINE

## 2021-02-18 PROCEDURE — 99213 PR OFFICE/OUTPT VISIT, EST, LEVL III, 20-29 MIN: ICD-10-PCS | Mod: S$GLB,,, | Performed by: ORTHOPAEDIC SURGERY

## 2021-02-18 PROCEDURE — 99213 OFFICE O/P EST LOW 20 MIN: CPT | Mod: S$GLB,,, | Performed by: ORTHOPAEDIC SURGERY

## 2021-02-18 PROCEDURE — 1159F PR MEDICATION LIST DOCUMENTED IN MEDICAL RECORD: ICD-10-PCS | Mod: S$GLB,,, | Performed by: ORTHOPAEDIC SURGERY

## 2021-02-18 PROCEDURE — 91300 COVID-19, MRNA, LNP-S, PF, 30 MCG/0.3 ML DOSE VACCINE: ICD-10-PCS | Mod: ,,, | Performed by: INTERNAL MEDICINE

## 2021-02-18 PROCEDURE — 1159F MED LIST DOCD IN RCRD: CPT | Mod: S$GLB,,, | Performed by: ORTHOPAEDIC SURGERY

## 2021-02-18 PROCEDURE — 0001A COVID-19, MRNA, LNP-S, PF, 30 MCG/0.3 ML DOSE VACCINE: CPT | Mod: CV19,,, | Performed by: INTERNAL MEDICINE

## 2021-02-18 PROCEDURE — 1101F PR PT FALLS ASSESS DOC 0-1 FALLS W/OUT INJ PAST YR: ICD-10-PCS | Mod: CPTII,S$GLB,, | Performed by: ORTHOPAEDIC SURGERY

## 2021-02-18 PROCEDURE — 3288F PR FALLS RISK ASSESSMENT DOCUMENTED: ICD-10-PCS | Mod: CPTII,S$GLB,, | Performed by: ORTHOPAEDIC SURGERY

## 2021-02-18 PROCEDURE — 3008F BODY MASS INDEX DOCD: CPT | Mod: CPTII,S$GLB,, | Performed by: ORTHOPAEDIC SURGERY

## 2021-02-18 PROCEDURE — 3008F PR BODY MASS INDEX (BMI) DOCUMENTED: ICD-10-PCS | Mod: CPTII,S$GLB,, | Performed by: ORTHOPAEDIC SURGERY

## 2021-02-19 PROBLEM — M17.12 PRIMARY OSTEOARTHRITIS OF LEFT KNEE: Status: ACTIVE | Noted: 2021-02-19

## 2021-03-01 ENCOUNTER — OFFICE VISIT (OUTPATIENT)
Dept: URGENT CARE | Facility: CLINIC | Age: 66
End: 2021-03-01
Payer: MEDICARE

## 2021-03-01 VITALS
DIASTOLIC BLOOD PRESSURE: 86 MMHG | HEART RATE: 68 BPM | WEIGHT: 166 LBS | TEMPERATURE: 98 F | RESPIRATION RATE: 18 BRPM | BODY MASS INDEX: 27.66 KG/M2 | HEIGHT: 65 IN | OXYGEN SATURATION: 97 % | SYSTOLIC BLOOD PRESSURE: 157 MMHG

## 2021-03-01 DIAGNOSIS — N30.00 ACUTE CYSTITIS WITHOUT HEMATURIA: Primary | ICD-10-CM

## 2021-03-01 DIAGNOSIS — R30.0 DYSURIA: ICD-10-CM

## 2021-03-01 LAB
BILIRUB UR QL STRIP: NEGATIVE
GLUCOSE UR QL STRIP: NEGATIVE
KETONES UR QL STRIP: NEGATIVE
LEUKOCYTE ESTERASE UR QL STRIP: NEGATIVE
PH, POC UA: 7.5 (ref 5–8)
POC BLOOD, URINE: NEGATIVE
POC NITRATES, URINE: NEGATIVE
PROT UR QL STRIP: NEGATIVE
SP GR UR STRIP: 1.01 (ref 1–1.03)
UROBILINOGEN UR STRIP-ACNC: NORMAL (ref 0.1–1.1)

## 2021-03-01 PROCEDURE — 99213 PR OFFICE/OUTPT VISIT, EST, LEVL III, 20-29 MIN: ICD-10-PCS | Mod: 25,S$GLB,, | Performed by: PHYSICIAN ASSISTANT

## 2021-03-01 PROCEDURE — 81003 URINALYSIS AUTO W/O SCOPE: CPT | Mod: QW,S$GLB,, | Performed by: PHYSICIAN ASSISTANT

## 2021-03-01 PROCEDURE — 87086 URINE CULTURE/COLONY COUNT: CPT

## 2021-03-01 PROCEDURE — 99213 OFFICE O/P EST LOW 20 MIN: CPT | Mod: 25,S$GLB,, | Performed by: PHYSICIAN ASSISTANT

## 2021-03-01 PROCEDURE — 3008F PR BODY MASS INDEX (BMI) DOCUMENTED: ICD-10-PCS | Mod: CPTII,S$GLB,, | Performed by: PHYSICIAN ASSISTANT

## 2021-03-01 PROCEDURE — 3008F BODY MASS INDEX DOCD: CPT | Mod: CPTII,S$GLB,, | Performed by: PHYSICIAN ASSISTANT

## 2021-03-01 PROCEDURE — 81003 POCT URINALYSIS, DIPSTICK, AUTOMATED, W/O SCOPE: ICD-10-PCS | Mod: QW,S$GLB,, | Performed by: PHYSICIAN ASSISTANT

## 2021-03-01 RX ORDER — NITROFURANTOIN 25; 75 MG/1; MG/1
100 CAPSULE ORAL 2 TIMES DAILY
Qty: 10 CAPSULE | Refills: 0 | Status: SHIPPED | OUTPATIENT
Start: 2021-03-01 | End: 2021-03-06

## 2021-03-02 LAB
BACTERIA UR CULT: NORMAL
BACTERIA UR CULT: NORMAL

## 2021-03-10 ENCOUNTER — OFFICE VISIT (OUTPATIENT)
Dept: PAIN MEDICINE | Facility: CLINIC | Age: 66
End: 2021-03-10
Payer: MEDICARE

## 2021-03-10 VITALS — DIASTOLIC BLOOD PRESSURE: 76 MMHG | SYSTOLIC BLOOD PRESSURE: 119 MMHG | HEART RATE: 66 BPM

## 2021-03-10 DIAGNOSIS — M25.562 CHRONIC PAIN OF LEFT KNEE: Primary | ICD-10-CM

## 2021-03-10 DIAGNOSIS — M17.12 PRIMARY OSTEOARTHRITIS OF LEFT KNEE: ICD-10-CM

## 2021-03-10 DIAGNOSIS — G89.29 CHRONIC PAIN OF LEFT KNEE: Primary | ICD-10-CM

## 2021-03-10 DIAGNOSIS — M25.462 EFFUSION OF LEFT KNEE: ICD-10-CM

## 2021-03-10 PROCEDURE — 99204 PR OFFICE/OUTPT VISIT, NEW, LEVL IV, 45-59 MIN: ICD-10-PCS | Mod: S$GLB,,, | Performed by: PAIN MEDICINE

## 2021-03-10 PROCEDURE — 1125F PR PAIN SEVERITY QUANTIFIED, PAIN PRESENT: ICD-10-PCS | Mod: S$GLB,,, | Performed by: PAIN MEDICINE

## 2021-03-10 PROCEDURE — 99204 OFFICE O/P NEW MOD 45 MIN: CPT | Mod: S$GLB,,, | Performed by: PAIN MEDICINE

## 2021-03-10 PROCEDURE — 1159F PR MEDICATION LIST DOCUMENTED IN MEDICAL RECORD: ICD-10-PCS | Mod: S$GLB,,, | Performed by: PAIN MEDICINE

## 2021-03-10 PROCEDURE — 99999 PR PBB SHADOW E&M-EST. PATIENT-LVL III: ICD-10-PCS | Mod: PBBFAC,,, | Performed by: PAIN MEDICINE

## 2021-03-10 PROCEDURE — 1159F MED LIST DOCD IN RCRD: CPT | Mod: S$GLB,,, | Performed by: PAIN MEDICINE

## 2021-03-10 PROCEDURE — 99999 PR PBB SHADOW E&M-EST. PATIENT-LVL III: CPT | Mod: PBBFAC,,, | Performed by: PAIN MEDICINE

## 2021-03-10 PROCEDURE — 1125F AMNT PAIN NOTED PAIN PRSNT: CPT | Mod: S$GLB,,, | Performed by: PAIN MEDICINE

## 2021-03-11 ENCOUNTER — IMMUNIZATION (OUTPATIENT)
Dept: INTERNAL MEDICINE | Facility: CLINIC | Age: 66
End: 2021-03-11
Payer: MEDICARE

## 2021-03-11 DIAGNOSIS — Z23 NEED FOR VACCINATION: Primary | ICD-10-CM

## 2021-03-11 PROCEDURE — 0002A COVID-19, MRNA, LNP-S, PF, 30 MCG/0.3 ML DOSE VACCINE: CPT | Mod: PBBFAC | Performed by: INTERNAL MEDICINE

## 2021-03-11 PROCEDURE — 91300 COVID-19, MRNA, LNP-S, PF, 30 MCG/0.3 ML DOSE VACCINE: CPT | Mod: PBBFAC | Performed by: INTERNAL MEDICINE

## 2021-03-12 ENCOUNTER — HOSPITAL ENCOUNTER (OUTPATIENT)
Dept: RADIOLOGY | Facility: HOSPITAL | Age: 66
Discharge: HOME | End: 2021-03-12
Attending: PAIN MEDICINE
Payer: MEDICARE

## 2021-03-12 DIAGNOSIS — G89.29 CHRONIC PAIN OF LEFT KNEE: ICD-10-CM

## 2021-03-12 DIAGNOSIS — M25.462 EFFUSION OF LEFT KNEE: ICD-10-CM

## 2021-03-12 DIAGNOSIS — M25.562 CHRONIC PAIN OF LEFT KNEE: ICD-10-CM

## 2021-03-12 PROCEDURE — 73721 MRI JNT OF LWR EXTRE W/O DYE: CPT | Mod: 26,LT,, | Performed by: RADIOLOGY

## 2021-03-12 PROCEDURE — 73721 MRI KNEE WITHOUT CONTRAST LEFT: ICD-10-PCS | Mod: 26,LT,, | Performed by: RADIOLOGY

## 2021-03-12 PROCEDURE — 73721 MRI JNT OF LWR EXTRE W/O DYE: CPT | Mod: TC,LT

## 2021-03-29 ENCOUNTER — TELEPHONE (OUTPATIENT)
Dept: PAIN MEDICINE | Facility: CLINIC | Age: 66
End: 2021-03-29

## 2021-03-29 ENCOUNTER — OFFICE VISIT (OUTPATIENT)
Dept: PAIN MEDICINE | Facility: CLINIC | Age: 66
End: 2021-03-29
Payer: MEDICARE

## 2021-03-29 VITALS — DIASTOLIC BLOOD PRESSURE: 75 MMHG | SYSTOLIC BLOOD PRESSURE: 134 MMHG | HEART RATE: 70 BPM

## 2021-03-29 DIAGNOSIS — M25.462 EFFUSION OF LEFT KNEE: ICD-10-CM

## 2021-03-29 DIAGNOSIS — M65.9 SYNOVITIS OF KNEE: ICD-10-CM

## 2021-03-29 DIAGNOSIS — S83.282D TEAR OF LATERAL MENISCUS OF LEFT KNEE, UNSPECIFIED TEAR TYPE, UNSPECIFIED WHETHER OLD OR CURRENT TEAR, SUBSEQUENT ENCOUNTER: Primary | ICD-10-CM

## 2021-03-29 DIAGNOSIS — M25.761 OSTEOPHYTE OF RIGHT KNEE: ICD-10-CM

## 2021-03-29 PROCEDURE — 99214 PR OFFICE/OUTPT VISIT, EST, LEVL IV, 30-39 MIN: ICD-10-PCS | Mod: S$GLB,,, | Performed by: PAIN MEDICINE

## 2021-03-29 PROCEDURE — 99999 PR PBB SHADOW E&M-EST. PATIENT-LVL II: CPT | Mod: PBBFAC,,, | Performed by: PAIN MEDICINE

## 2021-03-29 PROCEDURE — 99214 OFFICE O/P EST MOD 30 MIN: CPT | Mod: S$GLB,,, | Performed by: PAIN MEDICINE

## 2021-03-29 PROCEDURE — 1159F PR MEDICATION LIST DOCUMENTED IN MEDICAL RECORD: ICD-10-PCS | Mod: S$GLB,,, | Performed by: PAIN MEDICINE

## 2021-03-29 PROCEDURE — 99999 PR PBB SHADOW E&M-EST. PATIENT-LVL II: ICD-10-PCS | Mod: PBBFAC,,, | Performed by: PAIN MEDICINE

## 2021-03-29 PROCEDURE — 1159F MED LIST DOCD IN RCRD: CPT | Mod: S$GLB,,, | Performed by: PAIN MEDICINE

## 2021-03-29 PROCEDURE — 1125F PR PAIN SEVERITY QUANTIFIED, PAIN PRESENT: ICD-10-PCS | Mod: S$GLB,,, | Performed by: PAIN MEDICINE

## 2021-03-29 PROCEDURE — 1125F AMNT PAIN NOTED PAIN PRSNT: CPT | Mod: S$GLB,,, | Performed by: PAIN MEDICINE

## 2021-03-29 RX ORDER — DICLOFENAC SODIUM 10 MG/G
2 GEL TOPICAL DAILY
Qty: 2 TUBE | Refills: 2 | Status: SHIPPED | OUTPATIENT
Start: 2021-03-29 | End: 2023-01-13

## 2022-01-04 ENCOUNTER — IMMUNIZATION (OUTPATIENT)
Dept: INTERNAL MEDICINE | Facility: CLINIC | Age: 67
End: 2022-01-04
Payer: MEDICARE

## 2022-01-04 DIAGNOSIS — Z23 NEED FOR VACCINATION: Primary | ICD-10-CM

## 2022-01-04 PROCEDURE — 0004A COVID-19, MRNA, LNP-S, PF, 30 MCG/0.3 ML DOSE VACCINE: CPT | Mod: HCNC,CV19,PBBFAC | Performed by: INTERNAL MEDICINE

## 2022-01-19 ENCOUNTER — TELEPHONE (OUTPATIENT)
Dept: INTERNAL MEDICINE | Facility: CLINIC | Age: 67
End: 2022-01-19
Payer: MEDICARE

## 2022-01-19 ENCOUNTER — TELEPHONE (OUTPATIENT)
Dept: FAMILY MEDICINE | Facility: CLINIC | Age: 67
End: 2022-01-19
Payer: MEDICARE

## 2022-01-19 NOTE — TELEPHONE ENCOUNTER
----- Message from Fermín Cary sent at 1/19/2022 11:32 AM CST -----  Name Of Caller: Dennise        Provider Name: Iram Espinoza        Does patient feel the need to be seen today? no        Relationship to the Pt?: friend        Contact Preference?: [phone]        What is the nature of the call?: States that she needs to speak with someone in the office regarding an appt that this patient had scheduled for today at 11:20am but the patent was not seen

## 2022-01-25 ENCOUNTER — OFFICE VISIT (OUTPATIENT)
Dept: URGENT CARE | Facility: CLINIC | Age: 67
End: 2022-01-25
Payer: MEDICARE

## 2022-01-25 VITALS
SYSTOLIC BLOOD PRESSURE: 159 MMHG | TEMPERATURE: 98 F | OXYGEN SATURATION: 97 % | DIASTOLIC BLOOD PRESSURE: 81 MMHG | HEIGHT: 65 IN | BODY MASS INDEX: 27.73 KG/M2 | HEART RATE: 62 BPM | RESPIRATION RATE: 17 BRPM | WEIGHT: 166.44 LBS

## 2022-01-25 DIAGNOSIS — M17.12 PRIMARY OSTEOARTHRITIS OF LEFT KNEE: Primary | ICD-10-CM

## 2022-01-25 DIAGNOSIS — M17.12 OSTEOARTHRITIS OF LEFT KNEE, UNSPECIFIED OSTEOARTHRITIS TYPE: Primary | ICD-10-CM

## 2022-01-25 PROCEDURE — 3077F PR MOST RECENT SYSTOLIC BLOOD PRESSURE >= 140 MM HG: ICD-10-PCS | Mod: CPTII,S$GLB,, | Performed by: FAMILY MEDICINE

## 2022-01-25 PROCEDURE — 1159F PR MEDICATION LIST DOCUMENTED IN MEDICAL RECORD: ICD-10-PCS | Mod: CPTII,S$GLB,, | Performed by: FAMILY MEDICINE

## 2022-01-25 PROCEDURE — 3077F SYST BP >= 140 MM HG: CPT | Mod: CPTII,S$GLB,, | Performed by: FAMILY MEDICINE

## 2022-01-25 PROCEDURE — 3008F BODY MASS INDEX DOCD: CPT | Mod: CPTII,S$GLB,, | Performed by: FAMILY MEDICINE

## 2022-01-25 PROCEDURE — 1159F MED LIST DOCD IN RCRD: CPT | Mod: CPTII,S$GLB,, | Performed by: FAMILY MEDICINE

## 2022-01-25 PROCEDURE — 1160F PR REVIEW ALL MEDS BY PRESCRIBER/CLIN PHARMACIST DOCUMENTED: ICD-10-PCS | Mod: CPTII,S$GLB,, | Performed by: FAMILY MEDICINE

## 2022-01-25 PROCEDURE — 1160F RVW MEDS BY RX/DR IN RCRD: CPT | Mod: CPTII,S$GLB,, | Performed by: FAMILY MEDICINE

## 2022-01-25 PROCEDURE — 99213 PR OFFICE/OUTPT VISIT, EST, LEVL III, 20-29 MIN: ICD-10-PCS | Mod: S$GLB,,, | Performed by: FAMILY MEDICINE

## 2022-01-25 PROCEDURE — 3079F DIAST BP 80-89 MM HG: CPT | Mod: CPTII,S$GLB,, | Performed by: FAMILY MEDICINE

## 2022-01-25 PROCEDURE — 3008F PR BODY MASS INDEX (BMI) DOCUMENTED: ICD-10-PCS | Mod: CPTII,S$GLB,, | Performed by: FAMILY MEDICINE

## 2022-01-25 PROCEDURE — 3079F PR MOST RECENT DIASTOLIC BLOOD PRESSURE 80-89 MM HG: ICD-10-PCS | Mod: CPTII,S$GLB,, | Performed by: FAMILY MEDICINE

## 2022-01-25 PROCEDURE — 99213 OFFICE O/P EST LOW 20 MIN: CPT | Mod: S$GLB,,, | Performed by: FAMILY MEDICINE

## 2022-01-25 RX ORDER — IBUPROFEN 800 MG/1
800 TABLET ORAL EVERY 8 HOURS PRN
Qty: 30 TABLET | Refills: 1 | Status: ON HOLD | OUTPATIENT
Start: 2022-01-25 | End: 2022-04-01 | Stop reason: HOSPADM

## 2022-01-25 RX ORDER — IBUPROFEN 800 MG/1
800 TABLET ORAL EVERY 8 HOURS PRN
Qty: 30 TABLET | Refills: 1 | Status: SHIPPED | OUTPATIENT
Start: 2022-01-25 | End: 2022-01-25 | Stop reason: SDUPTHER

## 2022-01-25 NOTE — PATIENT INSTRUCTIONS
"Patient Education       Osteoarthritis   The Basics   Written by the doctors and editors at [facility]   What is osteoarthritis? -- Arthritis is a general term that means inflammation of the joints. There are dozens of types of arthritis. Osteoarthritis is the most common type. It often comes with age, and it often affects the hands, knees, and hips.  The place where 2 bones meet is normally covered with a rubbery material called cartilage. This material allows the bones to slide over each other without causing pain. When osteoarthritis sets in, the cartilage begins to break down. As it wears away, the bones in the joint start to rub against each other (figure 1). This can cause pain, stiffness, and swelling (table 1).  What can I do to feel better? -- To ease your symptoms, you can:  · Rest for several minutes when your pain is at its worst - But don't rest too long. That can make your muscles weak and your pain worse.  · Lose weight (if you are overweight) - Being overweight puts extra strain on your joints. Your doctor or nurse can talk to you about healthy ways to lose weight.  · Get plenty of physical activity - Having strong muscles takes some of the strain off of your joints. It can reduce your pain in the long run, even if it hurts to do at first. There are lots of different ways to get physical activity. Your doctor or nurse can help you come up with an exercise routine that works for you. They might also suggest you work with a physical therapist (exercise expert).   · Use "assistive devices" if your doctor recommends them - These devices can help keep your joints stable or take weight off them. Examples include shoe inserts, splints, canes, and walkers.  · Use hot or cold packs - Some people find that heat or cold helps relieves pain for a short time.  · Learn about arthritis - Learning about your condition allows you to work with your doctor or nurse to find the things that you are most helpful for you. It " can also help you better understand what to expect with your symptoms and treatment.  Can herbs, vitamins, or supplements help? -- There is no strong evidence that supplements of any sort work on arthritis symptoms. That's true even for glucosamine and chondroitin, which are supplements people seem to think help with arthritis. If you want to try any supplements or herbs, check with your doctor or nurse before taking them.  Are there medicines I can take? -- Usually, doctors suggest trying things like physical activity, weight loss, and assistive devices first. If these do not help with pain, they might recommend medicine. Options include medicines that come as pills as well as creams and gels that go on the skin.  In some situations, doctors might suggest shots that go into the joint to relieve pain. But these are not usually used as a main treatment, because they only work for a few weeks.  What about surgery? -- When other treatments do not help enough, some people with osteoarthritis get surgery. For instance, some people have surgery to replace a knee or a hip. Surgeons are working on other types of surgery for arthritis, too.  Try different things until you find what works -- The symptoms of osteoarthritis can be hard to handle. But don't lose hope. You might need to try different combinations of medicines, exercises, and devices to find the approach that works for you. But most people do find ways to go back to doing many of things they like to do.  All topics are updated as new evidence becomes available and our peer review process is complete.  This topic retrieved from [employer] on: Sep 21, 2021.  Topic 19690 Version 15.0  Release: 29.4.2 - C29.263  © 2021 UpToDate, Inc. and/or its affiliates. All rights reserved.  figure 1: Knee osteoarthritis     This drawing shows a normal knee joint next to a knee joint with osteoarthritis (OA). In the OA joint, the cartilage covering the ends of the bones roughens and  "becomes thin, while the bone underneath the cartilage grows thicker. Bony growths called "osteophytes" can form. The space between the bones also becomes narrower.  Graphic 474368 Version 2.0    table 1: Effects of osteoarthritis  Age when symptoms start    Usually after 40   Commonly affected body parts    Neck and lower back   Joint at the base of the thumb   Knuckles in the middle and near the tip of the fingers   Hip   Knee   Ankle joint   Knuckle at the base of the big toe   Less commonly affected body parts    Shoulder   Wrist   Elbow   Knuckles at the base of the fingers   Symptoms    Pain   Stiffness   Crackling or clicking sounds in the joints   Extra bone growth (for example, knuckles that look swollen or knobby)   Decreased range of motion   Problems with the alignment of certain joints   Tenderness to the touch   Graphic 68340 Version 2.0  Consumer Information Use and Disclaimer   This information is not specific medical advice and does not replace information you receive from your health care provider. This is only a brief summary of general information. It does NOT include all information about conditions, illnesses, injuries, tests, procedures, treatments, therapies, discharge instructions or life-style choices that may apply to you. You must talk with your health care provider for complete information about your health and treatment options. This information should not be used to decide whether or not to accept your health care provider's advice, instructions or recommendations. Only your health care provider has the knowledge and training to provide advice that is right for you. The use of this information is governed by the [employer] End User License Agreement, available at https://www.[employer]/en/solutions/[employer]/about/ridge.The use of [employer] content is governed by the [employer] Terms of Use. ©2021 UpToDate, Inc. All rights reserved.  Copyright   © 2021 UpToDate, Inc. and/or its " affiliates. All rights reserved.

## 2022-01-25 NOTE — PROGRESS NOTES
"Subjective:       Patient ID: Celina Azevedo is a 66 y.o. female.    Vitals:  height is 5' 5" (1.651 m) and weight is 75.5 kg (166 lb 7.2 oz). Her temporal temperature is 98.1 °F (36.7 °C). Her blood pressure is 159/81 (abnormal) and her pulse is 62. Her respiration is 17 and oxygen saturation is 97%.     Chief Complaint: Knee Pain    Pt reports that she has been having left knee pain for about on month. She says that the pain is sharp, is worst at night, and is swollen. Denies fever. She was told that she had to have surgery. She was prescribed naproxen but it did not help with the pain.     Knee Pain   The incident occurred more than 1 week ago. There was no injury mechanism. The pain is present in the left knee. The quality of the pain is described as stabbing. The pain is at a severity of 9/10. The pain is moderate. She reports no foreign bodies present. The symptoms are aggravated by movement and weight bearing. She has tried acetaminophen and NSAIDs for the symptoms. The treatment provided no relief.       Musculoskeletal: Positive for pain (left knee and thigh), joint pain, joint swelling, arthritis and muscle cramps.       Objective:      Physical Exam   Constitutional: She does not appear ill. No distress. obesity  Cardiovascular: Normal rate, regular rhythm, normal heart sounds and normal pulses.   Pulmonary/Chest: Effort normal and breath sounds normal.   Abdominal: Normal appearance.   Musculoskeletal: Normal range of motion.         General: Swelling (suprpartellar tenderness) and tenderness present. No signs of injury. Normal range of motion.   Neurological: She is alert.   Nursing note and vitals reviewed.        Assessment:       1. Osteoarthritis of left knee, unspecified osteoarthritis type        Reviewed records and previous Xrays and MRI  Plan:         Osteoarthritis of left knee, unspecified osteoarthritis type  -     Ambulatory referral/consult to Orthopedics  -     Discontinue: ibuprofen " (ADVIL,MOTRIN) 800 MG tablet; Take 1 tablet (800 mg total) by mouth every 8 (eight) hours as needed.  Dispense: 30 tablet; Refill: 1  -     ibuprofen (ADVIL,MOTRIN) 800 MG tablet; Take 1 tablet (800 mg total) by mouth every 8 (eight) hours as needed.  Dispense: 30 tablet; Refill: 1    discussed surgical options ongoing medical management. Opts for repeat surgical evaluation.

## 2022-01-26 ENCOUNTER — HOSPITAL ENCOUNTER (OUTPATIENT)
Dept: RADIOLOGY | Facility: HOSPITAL | Age: 67
Discharge: HOME | End: 2022-01-26
Attending: ORTHOPAEDIC SURGERY
Payer: MEDICARE

## 2022-01-26 ENCOUNTER — OFFICE VISIT (OUTPATIENT)
Dept: ORTHOPEDICS | Facility: CLINIC | Age: 67
End: 2022-01-26
Payer: MEDICARE

## 2022-01-26 VITALS — BODY MASS INDEX: 30.42 KG/M2 | HEIGHT: 62 IN | WEIGHT: 165.31 LBS

## 2022-01-26 DIAGNOSIS — S83.272A COMPLEX TEAR OF LATERAL MENISCUS OF LEFT KNEE AS CURRENT INJURY, INITIAL ENCOUNTER: Primary | ICD-10-CM

## 2022-01-26 DIAGNOSIS — M17.12 PRIMARY OSTEOARTHRITIS OF LEFT KNEE: ICD-10-CM

## 2022-01-26 PROCEDURE — 99214 PR OFFICE/OUTPT VISIT, EST, LEVL IV, 30-39 MIN: ICD-10-PCS | Mod: HCNC,S$GLB,, | Performed by: ORTHOPAEDIC SURGERY

## 2022-01-26 PROCEDURE — 3288F FALL RISK ASSESSMENT DOCD: CPT | Mod: HCNC,CPTII,S$GLB, | Performed by: ORTHOPAEDIC SURGERY

## 2022-01-26 PROCEDURE — 73562 X-RAY EXAM OF KNEE 3: CPT | Mod: TC,HCNC,RT

## 2022-01-26 PROCEDURE — 73564 X-RAY EXAM KNEE 4 OR MORE: CPT | Mod: 26,HCNC,LT, | Performed by: RADIOLOGY

## 2022-01-26 PROCEDURE — 1159F PR MEDICATION LIST DOCUMENTED IN MEDICAL RECORD: ICD-10-PCS | Mod: HCNC,CPTII,S$GLB, | Performed by: ORTHOPAEDIC SURGERY

## 2022-01-26 PROCEDURE — 73562 XR KNEE ORTHO LEFT WITH FLEXION: ICD-10-PCS | Mod: 26,HCNC,RT, | Performed by: RADIOLOGY

## 2022-01-26 PROCEDURE — 73564 XR KNEE ORTHO LEFT WITH FLEXION: ICD-10-PCS | Mod: 26,HCNC,LT, | Performed by: RADIOLOGY

## 2022-01-26 PROCEDURE — 1159F MED LIST DOCD IN RCRD: CPT | Mod: HCNC,CPTII,S$GLB, | Performed by: ORTHOPAEDIC SURGERY

## 2022-01-26 PROCEDURE — 73562 X-RAY EXAM OF KNEE 3: CPT | Mod: 26,HCNC,RT, | Performed by: RADIOLOGY

## 2022-01-26 PROCEDURE — 3008F BODY MASS INDEX DOCD: CPT | Mod: HCNC,CPTII,S$GLB, | Performed by: ORTHOPAEDIC SURGERY

## 2022-01-26 PROCEDURE — 1101F PR PT FALLS ASSESS DOC 0-1 FALLS W/OUT INJ PAST YR: ICD-10-PCS | Mod: HCNC,CPTII,S$GLB, | Performed by: ORTHOPAEDIC SURGERY

## 2022-01-26 PROCEDURE — 99214 OFFICE O/P EST MOD 30 MIN: CPT | Mod: HCNC,S$GLB,, | Performed by: ORTHOPAEDIC SURGERY

## 2022-01-26 PROCEDURE — 99999 PR PBB SHADOW E&M-EST. PATIENT-LVL II: ICD-10-PCS | Mod: PBBFAC,HCNC,, | Performed by: ORTHOPAEDIC SURGERY

## 2022-01-26 PROCEDURE — 1101F PT FALLS ASSESS-DOCD LE1/YR: CPT | Mod: HCNC,CPTII,S$GLB, | Performed by: ORTHOPAEDIC SURGERY

## 2022-01-26 PROCEDURE — 1125F AMNT PAIN NOTED PAIN PRSNT: CPT | Mod: HCNC,CPTII,S$GLB, | Performed by: ORTHOPAEDIC SURGERY

## 2022-01-26 PROCEDURE — 3288F PR FALLS RISK ASSESSMENT DOCUMENTED: ICD-10-PCS | Mod: HCNC,CPTII,S$GLB, | Performed by: ORTHOPAEDIC SURGERY

## 2022-01-26 PROCEDURE — 3008F PR BODY MASS INDEX (BMI) DOCUMENTED: ICD-10-PCS | Mod: HCNC,CPTII,S$GLB, | Performed by: ORTHOPAEDIC SURGERY

## 2022-01-26 PROCEDURE — 1125F PR PAIN SEVERITY QUANTIFIED, PAIN PRESENT: ICD-10-PCS | Mod: HCNC,CPTII,S$GLB, | Performed by: ORTHOPAEDIC SURGERY

## 2022-01-26 PROCEDURE — 99999 PR PBB SHADOW E&M-EST. PATIENT-LVL II: CPT | Mod: PBBFAC,HCNC,, | Performed by: ORTHOPAEDIC SURGERY

## 2022-01-26 NOTE — PROGRESS NOTES
Subjective:     HPI:   Celina Azevedo is a 66 y.o. female who presents for eval L knee, urgent care follow up     used    She was seen 2020 by Dr. Orellana for left knee pain.  Documented that she had a fall 2 weeks prior to this.  She had intra-articular steroid injection that helped significantly for few months.    She saw Dr. Orellana again 2021 where the documented discussion was that she was not interested in surgery and she was referred to pain management.    She saw pain management 2021 they gave her some Voltaren gel they ordered an MRI and they recommended a 2nd opinion by Dr. Owens but apparently that appointment was never set up.     She says things seemed to get better on their own and she was doing pretty well until about 2-3 months ago no specific injuries no change in activities she now complains of vague knee pain and mechanical symptoms.    Medications: Ibuprofen Rx UC yesterday, previously advil and diclofenac and tylenol    Injections: 10/2020 Left Knee CSi with Dr. 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    Social support: The patient stated that they live at home with their . The patient stated that their  would be able to help take care of them if they were to have surgery.        ROS:  The updated medical history is in the chart and has been reviewed. A review of systems is updated and there is no reported vision changes, ear/nose/mouth/throat complaints,  chest pain, shortness of breath, abdominal pain, urological  complaints, fevers or chills, psychiatric complaints. Musculoskeletal and neurologcial symptoms are as documented. All other systems are negative.      Objective:   Exam:  There were no vitals filed for this visit.  Body mass index is 30.23 kg/m².    Physical examination included assessment of the patient's general appearance with particular attention to development, nutrition, body habitus, attention to grooming, and any evidence of distress.  Constitutional: The patient is a well-developed, well-nourished patient in no acute distress.   Cardiovascular: Vascular examination included warmth and capillary refill as well inspection for edema and assessment of pedal pulses. Pulses are palpable and regular.  Musculoskeletal: Gait was assessed as to whether it was steady, non-antalgic, and/or required the use of an assist device. The patient was also asked to walk independently and get onto the examination table.  Skin: The skin was examined for any obvious rashes or lesions in the extremity.  Neurologic: Sensation is intact to light touch in the extremity. The patient has good coordination without hyperreflexia and is alert and oriented to person, place and time and has normal mood and affect.     All of the above were examined and found to be within normal limits except for the following pertinent clinical findings:    No limp nonantalgic gait 0-132 degree knee range of motion 6° valgus alignment tender palpation at the lateral joint line nontender medial and patellofemoral joint mild effusion knee stable anterior-posterior varus and valgus stresses no flexion contracture or extensor lag      Imaging:    Reviewed x-rays from today and previous, reviewed MRI from March of 2021.    She has got an unusual finding of a large osteophyte at her lateral tibial plateau but preserved tricompartmental joint spaces without any other significant degenerative changes on her x-ray.   No significant change from October 2020    MRI  from March shows lateral meniscus tear and lateral tibia osteophyte but no severe cartilage changes or bony edema       Assessment:       ICD-10-CM ICD-9-CM   1. Complex tear of lateral meniscus of left knee as current injury, initial encounter  S83.272A 836.1      No sig PMHx     Plan:       She is certainly an interesting pathology pattern on her x-ray and MRI.  She denies any history of previous injury to the knee in the remote past.     We discussed treatment options of non operative management with continued anti-inflammatories injections and physical therapy, arthroscopic debridement of meniscus and osteophyte, total knee replacement.    I do not think she would be a good total knee replacement candidate at this time and does not have significant tricompartmental degenerative changes.  I also do not think she would be a good lateral Uni candidate    I think it would be reasonable to consider limited scope arthroscopic debridement to address the lateral meniscus tear and try to shave down the lateral tibial osteophyte.    Her  recently had a shoulder scope by Dr. Fuentes so we will work on getting her plugged in with him for his opinion. I will message him as well.     Discussed case with Dr Fuentes who would like a new MRI prior to consideration of arthroscopic surgery, will order    No orders of the defined types were placed in this encounter.            No past medical history on file.    Past Surgical History:   Procedure Laterality Date    HYSTERECTOMY         No family history on file.    Social History     Socioeconomic History    Marital status:    Tobacco Use    Smoking status: Never Smoker    Smokeless tobacco: Never Used   Substance and Sexual Activity    Alcohol use: No    Drug use: No

## 2022-01-28 ENCOUNTER — TELEPHONE (OUTPATIENT)
Dept: ORTHOPEDICS | Facility: CLINIC | Age: 67
End: 2022-01-28
Payer: MEDICARE

## 2022-01-28 NOTE — TELEPHONE ENCOUNTER
I called the patient today regarding  the message below.  I spoke to the patient on the phone using an . The patient verbalized understanding and has no further questions.             ----- Message from Dejan Zhu III, MD sent at 1/26/2022  9:26 PM CST -----  Regarding: RE: knee scope  Thank you!     I know, definitely not ideal and very high risk for collapsing into valgus but I dont think she has nearly enough pain or limitations to discuss total right now, would do poorly.     I've done one lateral uni in my life and I regretted it. I don't think she is a good patient for that. Would much rather do total if things get worse over time and we can say we've done everything else.     Happy to go ahead and order MRI so you've got it when you see her.     Jac, please let patient know that Dr Fuentes would like a new MRI.        ----- Message -----  From: JEANINE Fuentes MD  Sent: 1/26/2022   3:32 PM CST  To: Dejan Zhu III, MD  Subject: RE: knee scope                                   Happy to help. Limited goals scope for sure. Scope wear severity and grade usually worse than imaging indicates. This is one we'll have to carefully consider risks/benefits. Will get repeat MRI to assess for bone marrow edema (last scan in 3/21). I've seen knees like this crater after a scope debridement. We will get her in.       ----- Message -----  From: Dejan Zhu III, MD  Sent: 1/26/2022  11:02 AM CST  To: JEANINE Fuentes MD, Richie Orourke, #  Subject: knee scope                                       Sending you this patient  You did her 's shoulder scope  She's seen rahman and pain management in the past    Interesting pathology, exam localizes to lateral joint no medial or PF pain  LMT + large osteophyte at lateral tibia but MRI from march shows cartilage ok  Had a couple months relief with steroid injection  I don't think she would be a good TKA candidate at this time but may be headed  there eventually     Lore and I both think that a scope could hopefully improve symptoms and buy her time with a scope debridement of lateral meniscus and try to shave down the phyte?     Appreciate you, please let me know what you think when you see her    -will

## 2022-01-28 NOTE — TELEPHONE ENCOUNTER
I called the patient today regarding her appointment with Dr. Fuentes. I told the patient that I have scheduled her for an MRI on 2/4. I left a message for the patient to call me back. I left my name and phone number.

## 2022-02-02 NOTE — PROGRESS NOTES
CC: Left knee pain    66 y.o. Female who presents as a new patient to me. Patient was referred from Dr. Dejan Zhu. She works as an ***. Complaint is left knee pain x *** with an atraumatic onset. Pain localizes ***.  Denies swelling or effusions. No prominent mechanical symptoms. Denies instability.  Worse with ***. Better with rest.Treatment thus far has included rest, activity modifications, oral medications and ***.  Here today to discuss diagnosis and treatment options.        REVIEW OF SYSTEMS:   Constitution: Negative. Negative for chills, fever and night sweats.    Hematologic/Lymphatic: Negative for bleeding problem. Does not bruise/bleed easily.   Skin: Negative for dry skin, itching and rash.   Musculoskeletal: Negative for falls. Positive for left knee pain and muscle weakness.     All other review of symptoms were reviewed and found to be noncontributory.     PAST MEDICAL HISTORY:   No past medical history on file.    PAST SURGICAL HISTORY:   Past Surgical History:   Procedure Laterality Date    HYSTERECTOMY         FAMILY HISTORY:   No family history on file.    SOCIAL HISTORY:   Social History     Socioeconomic History    Marital status:    Tobacco Use    Smoking status: Never Smoker    Smokeless tobacco: Never Used   Substance and Sexual Activity    Alcohol use: No    Drug use: No       MEDICATIONS:     Current Outpatient Medications:     diclofenac sodium (VOLTAREN) 1 % Gel, Apply 2 g topically once daily., Disp: 2 Tube, Rfl: 2    ibuprofen (ADVIL,MOTRIN) 800 MG tablet, Take 800 mg by mouth every 6 (six) hours as needed for Pain., Disp: , Rfl:     ibuprofen (ADVIL,MOTRIN) 800 MG tablet, Take 1 tablet (800 mg total) by mouth every 8 (eight) hours as needed., Disp: 30 tablet, Rfl: 1    ALLERGIES:   Review of patient's allergies indicates:  No Known Allergies     PHYSICAL EXAMINATION:  There were no vitals taken for this visit.  General: Well-developed well-nourished 66 y.o. femalein  no acute distress   Cardiovascular: Regular rhythm by palpation of distal pulse, normal color and temperature, no concerning varicosities on symptomatic side   Lungs: No labored breathing or wheezing appreciated   Neuro: Alert and oriented ×3   Psychiatric: well oriented to person, place and time, demonstrates normal mood and affect   Skin: No rashes, lesions or ulcers, normal temperature, turgor, and texture on involved extremity    Ortho/SPM Exam  Examination of the left knee demonstrates intact extensor mechanism. No effusion or prepatellar swelling. Central patellar tracking. No patellar apprehension. Normal patellar mobility. Full extension. Flexion to 130. No pain with forced flexion or extension. Prominent tenderness along the medial and lateral joint line. Negative Essie's. Negative Lachman. Stable to varus/valgus stress testing at 0 and 30 deg. Negative posterior drawer. Ligamentously stable.      IMAGING:  X-rays including standing, weight bearing AP and flexion bilateral knees, LEFT knee lateral and sunrise views ordered and images reviewed by me show:    There is moderate DJD.  No fracture dislocation bone destruction or OCD seen.  There are similar changes on the right.    MRI reviewed by me and discussed with patient. Study shows: ***    ASSESSMENT:    No diagnosis found.    PLAN:     -Findings and treatment options were discussed with the patient  -  -RTC ***  -All questions answered      Procedures

## 2022-02-04 ENCOUNTER — HOSPITAL ENCOUNTER (OUTPATIENT)
Dept: RADIOLOGY | Facility: OTHER | Age: 67
Discharge: HOME | End: 2022-02-04
Attending: ORTHOPAEDIC SURGERY
Payer: MEDICARE

## 2022-02-04 DIAGNOSIS — S83.272A COMPLEX TEAR OF LATERAL MENISCUS OF LEFT KNEE AS CURRENT INJURY, INITIAL ENCOUNTER: ICD-10-CM

## 2022-02-04 PROCEDURE — 73721 MRI JNT OF LWR EXTRE W/O DYE: CPT | Mod: TC,HCNC,LT

## 2022-02-04 PROCEDURE — 73721 MRI KNEE WITHOUT CONTRAST LEFT: ICD-10-PCS | Mod: 26,HCNC,LT, | Performed by: RADIOLOGY

## 2022-02-04 PROCEDURE — 73721 MRI JNT OF LWR EXTRE W/O DYE: CPT | Mod: 26,HCNC,LT, | Performed by: RADIOLOGY

## 2022-02-15 ENCOUNTER — HOSPITAL ENCOUNTER (OUTPATIENT)
Dept: RADIOLOGY | Facility: HOSPITAL | Age: 67
Discharge: HOME | End: 2022-02-15
Attending: ORTHOPAEDIC SURGERY
Payer: MEDICARE

## 2022-02-15 ENCOUNTER — OFFICE VISIT (OUTPATIENT)
Dept: SPORTS MEDICINE | Facility: CLINIC | Age: 67
End: 2022-02-15
Payer: MEDICARE

## 2022-02-15 VITALS
SYSTOLIC BLOOD PRESSURE: 142 MMHG | WEIGHT: 165 LBS | BODY MASS INDEX: 30.36 KG/M2 | HEART RATE: 74 BPM | HEIGHT: 62 IN | DIASTOLIC BLOOD PRESSURE: 77 MMHG

## 2022-02-15 DIAGNOSIS — M94.262 CHONDROMALACIA OF LEFT KNEE: ICD-10-CM

## 2022-02-15 DIAGNOSIS — M23.262 DERANGEMENT OF OTHER LATERAL MENISCUS DUE TO OLD TEAR OR INJURY, LEFT KNEE: Primary | ICD-10-CM

## 2022-02-15 DIAGNOSIS — Z01.818 PRE-OP TESTING: ICD-10-CM

## 2022-02-15 DIAGNOSIS — S83.242A ACUTE MEDIAL MENISCUS TEAR OF LEFT KNEE, INITIAL ENCOUNTER: ICD-10-CM

## 2022-02-15 PROCEDURE — 99204 OFFICE O/P NEW MOD 45 MIN: CPT | Mod: HCNC,S$GLB,, | Performed by: ORTHOPAEDIC SURGERY

## 2022-02-15 PROCEDURE — 99999 PR PBB SHADOW E&M-EST. PATIENT-LVL III: CPT | Mod: PBBFAC,HCNC,, | Performed by: ORTHOPAEDIC SURGERY

## 2022-02-15 PROCEDURE — 3077F PR MOST RECENT SYSTOLIC BLOOD PRESSURE >= 140 MM HG: ICD-10-PCS | Mod: HCNC,CPTII,S$GLB, | Performed by: ORTHOPAEDIC SURGERY

## 2022-02-15 PROCEDURE — 3008F PR BODY MASS INDEX (BMI) DOCUMENTED: ICD-10-PCS | Mod: HCNC,CPTII,S$GLB, | Performed by: ORTHOPAEDIC SURGERY

## 2022-02-15 PROCEDURE — 1101F PT FALLS ASSESS-DOCD LE1/YR: CPT | Mod: HCNC,CPTII,S$GLB, | Performed by: ORTHOPAEDIC SURGERY

## 2022-02-15 PROCEDURE — 99204 PR OFFICE/OUTPT VISIT, NEW, LEVL IV, 45-59 MIN: ICD-10-PCS | Mod: HCNC,S$GLB,, | Performed by: ORTHOPAEDIC SURGERY

## 2022-02-15 PROCEDURE — 3288F PR FALLS RISK ASSESSMENT DOCUMENTED: ICD-10-PCS | Mod: HCNC,CPTII,S$GLB, | Performed by: ORTHOPAEDIC SURGERY

## 2022-02-15 PROCEDURE — 99999 PR PBB SHADOW E&M-EST. PATIENT-LVL III: ICD-10-PCS | Mod: PBBFAC,HCNC,, | Performed by: ORTHOPAEDIC SURGERY

## 2022-02-15 PROCEDURE — 1126F PR PAIN SEVERITY QUANTIFIED, NO PAIN PRESENT: ICD-10-PCS | Mod: HCNC,CPTII,S$GLB, | Performed by: ORTHOPAEDIC SURGERY

## 2022-02-15 PROCEDURE — 1101F PR PT FALLS ASSESS DOC 0-1 FALLS W/OUT INJ PAST YR: ICD-10-PCS | Mod: HCNC,CPTII,S$GLB, | Performed by: ORTHOPAEDIC SURGERY

## 2022-02-15 PROCEDURE — 3288F FALL RISK ASSESSMENT DOCD: CPT | Mod: HCNC,CPTII,S$GLB, | Performed by: ORTHOPAEDIC SURGERY

## 2022-02-15 PROCEDURE — 3078F PR MOST RECENT DIASTOLIC BLOOD PRESSURE < 80 MM HG: ICD-10-PCS | Mod: HCNC,CPTII,S$GLB, | Performed by: ORTHOPAEDIC SURGERY

## 2022-02-15 PROCEDURE — 3078F DIAST BP <80 MM HG: CPT | Mod: HCNC,CPTII,S$GLB, | Performed by: ORTHOPAEDIC SURGERY

## 2022-02-15 PROCEDURE — 3077F SYST BP >= 140 MM HG: CPT | Mod: HCNC,CPTII,S$GLB, | Performed by: ORTHOPAEDIC SURGERY

## 2022-02-15 PROCEDURE — 1126F AMNT PAIN NOTED NONE PRSNT: CPT | Mod: HCNC,CPTII,S$GLB, | Performed by: ORTHOPAEDIC SURGERY

## 2022-02-15 PROCEDURE — 3008F BODY MASS INDEX DOCD: CPT | Mod: HCNC,CPTII,S$GLB, | Performed by: ORTHOPAEDIC SURGERY

## 2022-02-15 NOTE — PROGRESS NOTES
CC: Left knee pain    66 y.o. Female who presents as a new patient to me. Referred from Dr. Dejan Zhu. She is retired. Complaint is left knee pain x 1 year with an atraumatic onset. Pain localizes over the lateral joint line primarily.  Describes subjective swelling with intermittent mechanical complaints.  Bothersome clicking and popping with range of motion and activity. Denies instability.  Worse with walking, pivoting and stairs. Better with rest. States she does have pain at night intermittently. Treatment thus far has included rest, activity modifications, oral medications, therapy, and left knee CSI on 10/23/2020. She had mild relief from the injection.  Here today to discuss diagnosis and treatment options.  She is Irish-speaking.  Accompanied by her  who is a patient of mine.  He also is Irish-speaking.    Visit was interpreted today by  Elizabeth Ref #[id number]    Pain Score: 0-No pain    REVIEW OF SYSTEMS:   Constitution: Negative. Negative for chills, fever and night sweats.    Hematologic/Lymphatic: Negative for bleeding problem. Does not bruise/bleed easily.   Skin: Negative for dry skin, itching and rash.   Musculoskeletal: Negative for falls. Positive for left knee pain and muscle weakness.     All other review of symptoms were reviewed and found to be noncontributory.     PAST MEDICAL HISTORY:   History reviewed. No pertinent past medical history.    PAST SURGICAL HISTORY:   Past Surgical History:   Procedure Laterality Date    HYSTERECTOMY         FAMILY HISTORY:   History reviewed. No pertinent family history.    SOCIAL HISTORY:   Social History     Socioeconomic History    Marital status:    Tobacco Use    Smoking status: Never Smoker    Smokeless tobacco: Never Used   Substance and Sexual Activity    Alcohol use: No    Drug use: No       MEDICATIONS:     Current Outpatient Medications:     diclofenac sodium (VOLTAREN) 1 % Gel, Apply 2 g topically once  "daily., Disp: 2 Tube, Rfl: 2    ibuprofen (ADVIL,MOTRIN) 800 MG tablet, Take 800 mg by mouth every 6 (six) hours as needed for Pain., Disp: , Rfl:     ibuprofen (ADVIL,MOTRIN) 800 MG tablet, Take 1 tablet (800 mg total) by mouth every 8 (eight) hours as needed., Disp: 30 tablet, Rfl: 1    ALLERGIES:   Review of patient's allergies indicates:  No Known Allergies     PHYSICAL EXAMINATION:  BP (!) 142/77   Pulse 74   Ht 5' 2" (1.575 m)   Wt 74.8 kg (165 lb)   BMI 30.18 kg/m²   General: Well-developed well-nourished 66 y.o. femalein no acute distress   Cardiovascular: Regular rhythm by palpation of distal pulse, normal color and temperature, no concerning varicosities on symptomatic side   Lungs: No labored breathing or wheezing appreciated   Neuro: Alert and oriented ×3   Psychiatric: well oriented to person, place and time, demonstrates normal mood and affect   Skin: No rashes, lesions or ulcers, normal temperature, turgor, and texture on involved extremity    Ortho/SPM Exam  Examination of the left knee demonstrates intact extensor mechanism.  Trace swelling.  Mild peripatellar soft tissue tenderness.  Mild patellar crepitus.  Negative grind.  Prominent lateral joint line tenderness.  Mild tenderness over the medial joint line.  Positive Essie's maneuver for pain laterally.  Ligamentously stable.  Range of motion from full extension to 130° of flexion.  Pain laterally with forced flexion.    IMAGING:  X-rays including standing, weight bearing AP and flexion bilateral knees, LEFT knee lateral and sunrise views ordered and images reviewed by me show:    Predominant lateral joint line degenerative changes with prominent marginal tibial spurring.  Well-maintained joint spaces otherwise.    MRI reviewed by me and discussed with patient. Study shows:   1. Complex tear of the anterior horn and body segment of the lateral meniscus with small parameniscal cyst as above.  2. Moderate-sized joint effusion with " synovitis.  3. Lateral tibiofemoral osteophyte formation    ASSESSMENT:      ICD-10-CM ICD-9-CM   1. Derangement of other lateral meniscus due to old tear or injury, left knee  M23.262 717.49   2. Chondromalacia of left knee  M94.262 717.7   3. Pre-op testing  Z01.818 V72.84     PLAN:     -Findings and treatment options were discussed with the patient and her  at length via the .  She has seen multiple orthopedic providers with extensive prior conservative treatment.  Not a candidate yet for a knee replacement surgery.  She does have fairly significant pain which is laterally based with mechanical symptoms.  We discussed the potential benefit of arthroscopy for partial lateral meniscectomy, debridement and possible osteophyte removal for symptomatic relief.  This would be a limited goals option.  There is some risk for continued or recurrent pain, chondral wear, stiffness.  In rare cases the knee can worsen after arthroscopy.  I do think we can help her with her mechanical complaints.  Alternatively we can continue non operative course however it seems at this point she is quite frustrated with the knee.  I think she has an adequate understanding regarding reasonable expectations and the status of her knee.  She does wish to proceed with the arthroscopy.  -Plan for Left knee arthroscopy with partial lateral meniscectomy, chondroplasty, possible bone spur removal and synovectomy as indicated.  -Medical clearance - PCP  -All questions answered    Informed Consent:    The details of the surgical procedure were explained, including the location of probable incisions and a description of possible hardware and/or grafts to be used. Alternatives to both operative and non-operative options with associated risks and benefits were discussed. The patient understands the likely convalescence after surgery and, in particular, the expected postop rehab and recovery course. The outlined risks and potential  complications of the proposed procedure include but are not limited to: infection, poor wound healing, scarring, deformity, stiffness, swelling, continued or recurrent pain, instability, hardware or prosthetic failure if implanted, symptomatic hardware requiring removal, weakness, neurovascular injury, numbness, chronic regional pain disorder, tissue nonhealing/irreparability/retear, subsequent contralateral limb injury or pathology, chondral injury, arthritis, fracture, blood clot formation, inability to return to previous level of activity, anesthetic or regional block complication up to death, need for additional procedure as indicated intraoperatively, and potential need for further surgery.    The patient was also informed and understands that the risks of surgery are greater for patients with a current condition or history of heart disease, obesity, clotting disorders, recurrent infections, steroid use, current or past smoking, and factors such as sedentary lifestyle and noncompliance with medications, therapy or follow-up. The degree of the increased risk is hard to estimate with any degree of precision. If applicable, smoking cessation was discussed.     All questions were answered. The patient has verbalized understanding of these issues and wishes to proceed with the surgery as discussed.

## 2022-02-16 DIAGNOSIS — M94.262 CHONDROMALACIA, LEFT KNEE: ICD-10-CM

## 2022-02-16 DIAGNOSIS — M23.201 OLD TEAR OF LATERAL MENISCUS OF LEFT KNEE, UNSPECIFIED TEAR TYPE: Primary | ICD-10-CM

## 2022-03-08 ENCOUNTER — OFFICE VISIT (OUTPATIENT)
Dept: INTERNAL MEDICINE | Facility: CLINIC | Age: 67
End: 2022-03-08
Payer: MEDICARE

## 2022-03-08 ENCOUNTER — HOSPITAL ENCOUNTER (OUTPATIENT)
Dept: RADIOLOGY | Facility: HOSPITAL | Age: 67
Discharge: HOME | End: 2022-03-08
Attending: NURSE PRACTITIONER
Payer: MEDICARE

## 2022-03-08 VITALS
WEIGHT: 167.13 LBS | BODY MASS INDEX: 30.76 KG/M2 | HEIGHT: 62 IN | RESPIRATION RATE: 16 BRPM | DIASTOLIC BLOOD PRESSURE: 70 MMHG | OXYGEN SATURATION: 95 % | HEART RATE: 65 BPM | SYSTOLIC BLOOD PRESSURE: 138 MMHG

## 2022-03-08 DIAGNOSIS — N18.9 CHRONIC KIDNEY DISEASE, UNSPECIFIED CKD STAGE: ICD-10-CM

## 2022-03-08 DIAGNOSIS — Z13.6 ENCOUNTER FOR LIPID SCREENING FOR CARDIOVASCULAR DISEASE: ICD-10-CM

## 2022-03-08 DIAGNOSIS — G89.29 CHRONIC PAIN OF LEFT KNEE: ICD-10-CM

## 2022-03-08 DIAGNOSIS — Z12.31 ENCOUNTER FOR SCREENING MAMMOGRAM FOR MALIGNANT NEOPLASM OF BREAST: ICD-10-CM

## 2022-03-08 DIAGNOSIS — Z13.1 SCREENING FOR DIABETES MELLITUS: ICD-10-CM

## 2022-03-08 DIAGNOSIS — Z78.0 POSTMENOPAUSE: ICD-10-CM

## 2022-03-08 DIAGNOSIS — M25.462 EFFUSION OF LEFT KNEE: ICD-10-CM

## 2022-03-08 DIAGNOSIS — M17.12 PRIMARY OSTEOARTHRITIS OF LEFT KNEE: ICD-10-CM

## 2022-03-08 DIAGNOSIS — M25.562 CHRONIC PAIN OF LEFT KNEE: ICD-10-CM

## 2022-03-08 DIAGNOSIS — Z13.220 ENCOUNTER FOR LIPID SCREENING FOR CARDIOVASCULAR DISEASE: ICD-10-CM

## 2022-03-08 DIAGNOSIS — Z23 NEEDS FLU SHOT: ICD-10-CM

## 2022-03-08 DIAGNOSIS — Z11.59 ENCOUNTER FOR HEPATITIS C SCREENING TEST FOR LOW RISK PATIENT: ICD-10-CM

## 2022-03-08 DIAGNOSIS — E07.9 DISORDER OF THYROID, UNSPECIFIED: ICD-10-CM

## 2022-03-08 DIAGNOSIS — Z00.00 ENCOUNTER FOR MEDICAL EXAMINATION TO ESTABLISH CARE: ICD-10-CM

## 2022-03-08 DIAGNOSIS — Z01.818 PRE-OP EXAMINATION: ICD-10-CM

## 2022-03-08 DIAGNOSIS — Z00.00 ENCOUNTER FOR MEDICAL EXAMINATION TO ESTABLISH CARE: Primary | ICD-10-CM

## 2022-03-08 DIAGNOSIS — M25.062 HEMARTHROSIS, LEFT KNEE: ICD-10-CM

## 2022-03-08 DIAGNOSIS — M63.869: ICD-10-CM

## 2022-03-08 PROCEDURE — 1101F PT FALLS ASSESS-DOCD LE1/YR: CPT | Mod: CPTII,S$GLB,, | Performed by: NURSE PRACTITIONER

## 2022-03-08 PROCEDURE — 93010 EKG 12-LEAD: ICD-10-PCS | Mod: S$GLB,,, | Performed by: INTERNAL MEDICINE

## 2022-03-08 PROCEDURE — 99999 PR PBB SHADOW E&M-EST. PATIENT-LVL IV: ICD-10-PCS | Mod: PBBFAC,,, | Performed by: NURSE PRACTITIONER

## 2022-03-08 PROCEDURE — 1159F PR MEDICATION LIST DOCUMENTED IN MEDICAL RECORD: ICD-10-PCS | Mod: CPTII,S$GLB,, | Performed by: NURSE PRACTITIONER

## 2022-03-08 PROCEDURE — 93005 EKG 12-LEAD: ICD-10-PCS | Mod: S$GLB,,, | Performed by: NURSE PRACTITIONER

## 2022-03-08 PROCEDURE — 93010 ELECTROCARDIOGRAM REPORT: CPT | Mod: S$GLB,,, | Performed by: INTERNAL MEDICINE

## 2022-03-08 PROCEDURE — 99205 OFFICE O/P NEW HI 60 MIN: CPT | Mod: S$GLB,,, | Performed by: NURSE PRACTITIONER

## 2022-03-08 PROCEDURE — 3078F PR MOST RECENT DIASTOLIC BLOOD PRESSURE < 80 MM HG: ICD-10-PCS | Mod: CPTII,S$GLB,, | Performed by: NURSE PRACTITIONER

## 2022-03-08 PROCEDURE — 3008F PR BODY MASS INDEX (BMI) DOCUMENTED: ICD-10-PCS | Mod: CPTII,S$GLB,, | Performed by: NURSE PRACTITIONER

## 2022-03-08 PROCEDURE — 93005 ELECTROCARDIOGRAM TRACING: CPT | Mod: S$GLB,,, | Performed by: NURSE PRACTITIONER

## 2022-03-08 PROCEDURE — 3008F BODY MASS INDEX DOCD: CPT | Mod: CPTII,S$GLB,, | Performed by: NURSE PRACTITIONER

## 2022-03-08 PROCEDURE — 3075F PR MOST RECENT SYSTOLIC BLOOD PRESS GE 130-139MM HG: ICD-10-PCS | Mod: CPTII,S$GLB,, | Performed by: NURSE PRACTITIONER

## 2022-03-08 PROCEDURE — 90694 FLU VACCINE - QUADRIVALENT - ADJUVANTED: ICD-10-PCS | Mod: S$GLB,,, | Performed by: NURSE PRACTITIONER

## 2022-03-08 PROCEDURE — G0008 ADMIN INFLUENZA VIRUS VAC: HCPCS | Mod: S$GLB,,, | Performed by: NURSE PRACTITIONER

## 2022-03-08 PROCEDURE — 3288F FALL RISK ASSESSMENT DOCD: CPT | Mod: CPTII,S$GLB,, | Performed by: NURSE PRACTITIONER

## 2022-03-08 PROCEDURE — 99205 PR OFFICE/OUTPT VISIT, NEW, LEVL V, 60-74 MIN: ICD-10-PCS | Mod: S$GLB,,, | Performed by: NURSE PRACTITIONER

## 2022-03-08 PROCEDURE — 99999 PR PBB SHADOW E&M-EST. PATIENT-LVL IV: CPT | Mod: PBBFAC,,, | Performed by: NURSE PRACTITIONER

## 2022-03-08 PROCEDURE — 71046 XR CHEST PA AND LATERAL: ICD-10-PCS | Mod: 26,,, | Performed by: STUDENT IN AN ORGANIZED HEALTH CARE EDUCATION/TRAINING PROGRAM

## 2022-03-08 PROCEDURE — 71046 X-RAY EXAM CHEST 2 VIEWS: CPT | Mod: 26,,, | Performed by: STUDENT IN AN ORGANIZED HEALTH CARE EDUCATION/TRAINING PROGRAM

## 2022-03-08 PROCEDURE — 90694 VACC AIIV4 NO PRSRV 0.5ML IM: CPT | Mod: S$GLB,,, | Performed by: NURSE PRACTITIONER

## 2022-03-08 PROCEDURE — 3075F SYST BP GE 130 - 139MM HG: CPT | Mod: CPTII,S$GLB,, | Performed by: NURSE PRACTITIONER

## 2022-03-08 PROCEDURE — 1101F PR PT FALLS ASSESS DOC 0-1 FALLS W/OUT INJ PAST YR: ICD-10-PCS | Mod: CPTII,S$GLB,, | Performed by: NURSE PRACTITIONER

## 2022-03-08 PROCEDURE — 1159F MED LIST DOCD IN RCRD: CPT | Mod: CPTII,S$GLB,, | Performed by: NURSE PRACTITIONER

## 2022-03-08 PROCEDURE — 3078F DIAST BP <80 MM HG: CPT | Mod: CPTII,S$GLB,, | Performed by: NURSE PRACTITIONER

## 2022-03-08 PROCEDURE — 3288F PR FALLS RISK ASSESSMENT DOCUMENTED: ICD-10-PCS | Mod: CPTII,S$GLB,, | Performed by: NURSE PRACTITIONER

## 2022-03-08 PROCEDURE — G0008 FLU VACCINE - QUADRIVALENT - ADJUVANTED: ICD-10-PCS | Mod: S$GLB,,, | Performed by: NURSE PRACTITIONER

## 2022-03-08 PROCEDURE — 71046 X-RAY EXAM CHEST 2 VIEWS: CPT | Mod: TC

## 2022-03-08 NOTE — PROGRESS NOTES
[facility] Note    Chief Complaint      Chief Complaint   Patient presents with    Pre-op Exam     History of Present Illness      Celina Azevedo is a 66 y.o. female patient who is new to me and presents today for establish care visit.  Patient feeling well no complaints, denies shortness of breath or chest pain, reviewed meds and history patient.  No concerns of bowel or bladder function    Patient also here for medical clearance for L knee arthroscopy with Dr. Fuentes on 4/1/22 due to a complex tear of her lateral meniscus of her left knee.     EKG- NSR  CXR ordered  Labs ordered to clear pt for surgery    Labs ordered  mammo-ordered  dexa scan ordered  Colonoscopy- due      Vaccines:  covid- pfizer x 3  Flu shot- today  Tdap- due at pharm  shingrix-due  Pneumonia-due      PT NEEDS [place] PCP IF POSSIBLE    Health Maintenance   Topic Date Due    Hepatitis C Screening  Never done    Lipid Panel  Never done    TETANUS VACCINE  Never done    Mammogram  Never done    DEXA Scan  Never done       History reviewed. No pertinent past medical history.    Past Surgical History:   Procedure Laterality Date    HYSTERECTOMY         family history is not on file.    Social History     Tobacco Use    Smoking status: Never Smoker    Smokeless tobacco: Never Used   Substance Use Topics    Alcohol use: No    Drug use: No       Review of Systems   Constitutional: Negative for chills and fever.   HENT: Negative for congestion, sinus pain and sore throat.    Eyes: Negative for blurred vision.   Respiratory: Negative for cough, shortness of breath and wheezing.    Cardiovascular: Negative for chest pain, palpitations and leg swelling.   Gastrointestinal: Negative for abdominal pain, constipation, diarrhea, nausea and vomiting.   Genitourinary: Negative for dysuria.   Musculoskeletal: Negative for myalgias.   Skin: Negative for rash.   Neurological: Negative for dizziness, weakness and headaches.  "  Psychiatric/Behavioral: Negative for depression. The patient is not nervous/anxious.         Outpatient Encounter Medications as of 3/8/2022   Medication Sig Dispense Refill    ibuprofen (ADVIL,MOTRIN) 800 MG tablet Take 800 mg by mouth every 6 (six) hours as needed for Pain.      ibuprofen (ADVIL,MOTRIN) 800 MG tablet Take 1 tablet (800 mg total) by mouth every 8 (eight) hours as needed. 30 tablet 1    diclofenac sodium (VOLTAREN) 1 % Gel Apply 2 g topically once daily. (Patient not taking: Reported on 3/8/2022) 2 Tube 2     No facility-administered encounter medications on file as of 3/8/2022.        Review of patient's allergies indicates:  No Known Allergies    Physical Exam      Vital Signs  Pulse: 65  Resp: 16  SpO2: 95 %  BP: 138/70  BP Location: Right arm  Patient Position: Sitting  Height and Weight  Height: 5' 2" (157.5 cm)  Weight: 75.8 kg (167 lb 1.7 oz)  BSA (Calculated - sq m): 1.82 sq meters  BMI (Calculated): 30.6  Weight in (lb) to have BMI = 25: 136.4    Physical Exam  Vitals and nursing note reviewed.   Constitutional:       General: She is not in acute distress.     Appearance: Normal appearance. She is well-developed.   HENT:      Head: Normocephalic and atraumatic.      Right Ear: External ear normal.      Left Ear: External ear normal.   Eyes:      Conjunctiva/sclera: Conjunctivae normal.      Pupils: Pupils are equal, round, and reactive to light.   Neck:      Thyroid: No thyromegaly.      Vascular: No JVD.      Trachea: No tracheal deviation.   Cardiovascular:      Rate and Rhythm: Normal rate and regular rhythm.      Heart sounds: Normal heart sounds. No murmur heard.  Pulmonary:      Effort: Pulmonary effort is normal.      Breath sounds: Normal breath sounds.   Chest:      Chest wall: No tenderness.   Abdominal:      General: Bowel sounds are normal.      Palpations: Abdomen is soft.      Tenderness: There is no abdominal tenderness. There is no guarding.   Musculoskeletal:         " General: Normal range of motion.      Cervical back: Normal range of motion and neck supple.   Lymphadenopathy:      Cervical: No cervical adenopathy.   Skin:     General: Skin is warm and dry.   Neurological:      Mental Status: She is alert and oriented to person, place, and time.   Psychiatric:         Behavior: Behavior normal.         Thought Content: Thought content normal.         Judgment: Judgment normal.          Laboratory:  CBC:  No results found for: WBC, RBC, HGB, HCT, PLT, MCV, MCH, MCHC  CMP:  No results found for: GLU, CALCIUM, ALBUMIN, PROT, NA, K, CO2, CL, BUN, ALKPHOS, ALT, AST, BILITOT  URINALYSIS:  Lab Results   Component Value Date    COLORU Straw 08/07/2019    SPECGRAV 1.000 (A) 08/07/2019    PHUR 7.5 03/01/2021    PROTEINUA Negative 08/07/2019    BACTERIA Rare 08/07/2019    NITRITE Negative 08/07/2019    LEUKOCYTESUR 3+ (A) 08/07/2019      LIPIDS:  No results found for: TSH, HDL, CHOL, TRIG, LDLCALC, CHOLHDL, NONHDLCHOL, TOTALCHOLEST  TSH:  No results found for: TSH  A1C:  No results found for: HGBA1C      Assessment/Plan     Celina Azevedo is a 66 y.o.female with:    Encounter for medical examination to establish care  -     CBC Auto Differential; Future; Expected date: 03/08/2022  -     Comprehensive Metabolic Panel; Future; Expected date: 03/08/2022  -     Hemoglobin A1C; Future; Expected date: 03/08/2022  -     Lipid Panel; Future; Expected date: 03/08/2022  -     T4, Free; Future; Expected date: 03/08/2022  -     TSH; Future; Expected date: 03/08/2022  -     Hepatitis C Antibody; Future; Expected date: 03/08/2022  -     APTT; Future; Expected date: 03/08/2022  -     Protime-INR; Future; Expected date: 03/08/2022  -     X-Ray Chest PA And Lateral; Future; Expected date: 03/08/2022  -     IN OFFICE EKG 12-LEAD (to Muse)  -     Mammo Digital Screening Bilat w/ Yves; Future; Expected date: 03/08/2022  -     DXA Bone Density Spine And Hip; Future; Expected date: 03/08/2022    Pre-op  examination  -     CBC Auto Differential; Future; Expected date: 03/08/2022  -     Comprehensive Metabolic Panel; Future; Expected date: 03/08/2022  -     Hemoglobin A1C; Future; Expected date: 03/08/2022  -     Lipid Panel; Future; Expected date: 03/08/2022  -     T4, Free; Future; Expected date: 03/08/2022  -     TSH; Future; Expected date: 03/08/2022  -     Hepatitis C Antibody; Future; Expected date: 03/08/2022  -     APTT; Future; Expected date: 03/08/2022  -     Protime-INR; Future; Expected date: 03/08/2022  -     X-Ray Chest PA And Lateral; Future; Expected date: 03/08/2022  -     IN OFFICE EKG 12-LEAD (to Muse)  -     Mammo Digital Screening Bilat w/ Yves; Future; Expected date: 03/08/2022  -     DXA Bone Density Spine And Hip; Future; Expected date: 03/08/2022    Chronic pain of left knee  -     CBC Auto Differential; Future; Expected date: 03/08/2022  -     Comprehensive Metabolic Panel; Future; Expected date: 03/08/2022  -     Hemoglobin A1C; Future; Expected date: 03/08/2022  -     Lipid Panel; Future; Expected date: 03/08/2022  -     T4, Free; Future; Expected date: 03/08/2022  -     TSH; Future; Expected date: 03/08/2022  -     Hepatitis C Antibody; Future; Expected date: 03/08/2022  -     APTT; Future; Expected date: 03/08/2022  -     Protime-INR; Future; Expected date: 03/08/2022  -     X-Ray Chest PA And Lateral; Future; Expected date: 03/08/2022  -     IN OFFICE EKG 12-LEAD (to Muse)  -     Mammo Digital Screening Bilat w/ Yves; Future; Expected date: 03/08/2022  -     DXA Bone Density Spine And Hip; Future; Expected date: 03/08/2022    Primary osteoarthritis of left knee  -     CBC Auto Differential; Future; Expected date: 03/08/2022  -     Comprehensive Metabolic Panel; Future; Expected date: 03/08/2022  -     Hemoglobin A1C; Future; Expected date: 03/08/2022  -     Lipid Panel; Future; Expected date: 03/08/2022  -     T4, Free; Future; Expected date: 03/08/2022  -     TSH; Future; Expected  date: 03/08/2022  -     Hepatitis C Antibody; Future; Expected date: 03/08/2022  -     APTT; Future; Expected date: 03/08/2022  -     Protime-INR; Future; Expected date: 03/08/2022  -     X-Ray Chest PA And Lateral; Future; Expected date: 03/08/2022  -     IN OFFICE EKG 12-LEAD (to Muse)  -     Mammo Digital Screening Bilat w/ Yves; Future; Expected date: 03/08/2022  -     DXA Bone Density Spine And Hip; Future; Expected date: 03/08/2022    Disorders of muscle in diseases classified elsewhere, unspecified lower leg   -     T4, Free; Future; Expected date: 03/08/2022  -     Mammo Digital Screening Bilat w/ Yves; Future; Expected date: 03/08/2022  -     DXA Bone Density Spine And Hip; Future; Expected date: 03/08/2022    Effusion of left knee    Disorder of thyroid, unspecified   -     TSH; Future; Expected date: 03/08/2022  -     Mammo Digital Screening Bilat w/ Yves; Future; Expected date: 03/08/2022  -     DXA Bone Density Spine And Hip; Future; Expected date: 03/08/2022    BMI 30.0-30.9,adult  -     CBC Auto Differential; Future; Expected date: 03/08/2022  -     Comprehensive Metabolic Panel; Future; Expected date: 03/08/2022  -     Hemoglobin A1C; Future; Expected date: 03/08/2022  -     Lipid Panel; Future; Expected date: 03/08/2022  -     T4, Free; Future; Expected date: 03/08/2022  -     TSH; Future; Expected date: 03/08/2022  -     Hepatitis C Antibody; Future; Expected date: 03/08/2022  -     APTT; Future; Expected date: 03/08/2022  -     Protime-INR; Future; Expected date: 03/08/2022  -     X-Ray Chest PA And Lateral; Future; Expected date: 03/08/2022  -     IN OFFICE EKG 12-LEAD (to Muse)  -     Mammo Digital Screening Bilat w/ Yves; Future; Expected date: 03/08/2022  -     DXA Bone Density Spine And Hip; Future; Expected date: 03/08/2022    Postmenopause  -     CBC Auto Differential; Future; Expected date: 03/08/2022  -     Comprehensive Metabolic Panel; Future; Expected date: 03/08/2022  -     Hemoglobin  A1C; Future; Expected date: 03/08/2022  -     Lipid Panel; Future; Expected date: 03/08/2022  -     T4, Free; Future; Expected date: 03/08/2022  -     TSH; Future; Expected date: 03/08/2022  -     Hepatitis C Antibody; Future; Expected date: 03/08/2022  -     APTT; Future; Expected date: 03/08/2022  -     Protime-INR; Future; Expected date: 03/08/2022  -     X-Ray Chest PA And Lateral; Future; Expected date: 03/08/2022  -     IN OFFICE EKG 12-LEAD (to Muse)  -     Mammo Digital Screening Bilat w/ Yves; Future; Expected date: 03/08/2022  -     DXA Bone Density Spine And Hip; Future; Expected date: 03/08/2022    Chronic kidney disease, unspecified CKD stage   -     Protime-INR; Future; Expected date: 03/08/2022  -     Mammo Digital Screening Bilat w/ Yves; Future; Expected date: 03/08/2022  -     DXA Bone Density Spine And Hip; Future; Expected date: 03/08/2022    Hemarthrosis, left knee   -     APTT; Future; Expected date: 03/08/2022  -     Mammo Digital Screening Bilat w/ Yves; Future; Expected date: 03/08/2022  -     DXA Bone Density Spine And Hip; Future; Expected date: 03/08/2022    Screening for diabetes mellitus  -     CBC Auto Differential; Future; Expected date: 03/08/2022  -     Comprehensive Metabolic Panel; Future; Expected date: 03/08/2022  -     Hemoglobin A1C; Future; Expected date: 03/08/2022  -     Lipid Panel; Future; Expected date: 03/08/2022  -     T4, Free; Future; Expected date: 03/08/2022  -     TSH; Future; Expected date: 03/08/2022  -     Hepatitis C Antibody; Future; Expected date: 03/08/2022  -     APTT; Future; Expected date: 03/08/2022  -     Protime-INR; Future; Expected date: 03/08/2022  -     X-Ray Chest PA And Lateral; Future; Expected date: 03/08/2022  -     IN OFFICE EKG 12-LEAD (to Muse)  -     Mammo Digital Screening Bilat w/ Yves; Future; Expected date: 03/08/2022  -     DXA Bone Density Spine And Hip; Future; Expected date: 03/08/2022    Encounter for lipid screening for  cardiovascular disease  -     CBC Auto Differential; Future; Expected date: 03/08/2022  -     Comprehensive Metabolic Panel; Future; Expected date: 03/08/2022  -     Hemoglobin A1C; Future; Expected date: 03/08/2022  -     Lipid Panel; Future; Expected date: 03/08/2022  -     T4, Free; Future; Expected date: 03/08/2022  -     TSH; Future; Expected date: 03/08/2022  -     Hepatitis C Antibody; Future; Expected date: 03/08/2022  -     APTT; Future; Expected date: 03/08/2022  -     Protime-INR; Future; Expected date: 03/08/2022  -     X-Ray Chest PA And Lateral; Future; Expected date: 03/08/2022  -     IN OFFICE EKG 12-LEAD (to Muse)  -     Mammo Digital Screening Bilat w/ Yves; Future; Expected date: 03/08/2022  -     DXA Bone Density Spine And Hip; Future; Expected date: 03/08/2022    Encounter for hepatitis C screening test for low risk patient  -     CBC Auto Differential; Future; Expected date: 03/08/2022  -     Comprehensive Metabolic Panel; Future; Expected date: 03/08/2022  -     Hemoglobin A1C; Future; Expected date: 03/08/2022  -     Lipid Panel; Future; Expected date: 03/08/2022  -     T4, Free; Future; Expected date: 03/08/2022  -     TSH; Future; Expected date: 03/08/2022  -     Hepatitis C Antibody; Future; Expected date: 03/08/2022  -     APTT; Future; Expected date: 03/08/2022  -     Protime-INR; Future; Expected date: 03/08/2022  -     X-Ray Chest PA And Lateral; Future; Expected date: 03/08/2022  -     IN OFFICE EKG 12-LEAD (to Muse)  -     Mammo Digital Screening Bilat w/ Yves; Future; Expected date: 03/08/2022  -     DXA Bone Density Spine And Hip; Future; Expected date: 03/08/2022    Encounter for screening mammogram for malignant neoplasm of breast  -     Mammo Digital Screening Bilat w/ Yves; Future; Expected date: 03/08/2022  -     DXA Bone Density Spine And Hip; Future; Expected date: 03/08/2022    Needs flu shot  -     Influenza (FLUAD) - Quadrivalent (Adjuvanted) *Preferred* (65+) (PF)       PT NEEDS [place] PCP IF POSSIBLE    I spent 30 minutes on the day of this encounter for preparing for, evaluating, treating, and managing this patient.      -Continue current medications and maintain follow up with specialists.  Return to clinic within 6 months with PCP to f/u with chronic conditions   No follow-ups on file.     PT IS MEDICALLY OPTIMIZED FOR LOW RISK PROCEDURE WITH LOW CARDIAC RISK PROFILE AND MAY PROCEED WITH SURGERY WITH DR. GANN ON 4/1/22      RAMESH Hernandez  [facility]

## 2022-03-11 ENCOUNTER — PATIENT MESSAGE (OUTPATIENT)
Dept: SPORTS MEDICINE | Facility: CLINIC | Age: 67
End: 2022-03-11
Payer: MEDICARE

## 2022-03-23 ENCOUNTER — TELEPHONE (OUTPATIENT)
Dept: INTERNAL MEDICINE | Facility: CLINIC | Age: 67
End: 2022-03-23
Payer: MEDICARE

## 2022-03-23 NOTE — TELEPHONE ENCOUNTER
----- Message from Bhavesh Salinas MA sent at 3/23/2022 11:00 AM CDT -----  Regarding: Surgery Clearance  Good morning,    The attached patient is shared by you and Dr. Fuentes.     The patient is scheduled for a left knee scope, meniscectomy, and chondroplasty on 4/1/2022.    Medical clearance is indicated prior to this. The patient was seen by you on 3/8/22. The anesthesia team will require explicit documentation in the chart regarding her clearance status and perioperative medical recommendations.    Thank you for your help & let me know if I can assist in any way!    Bhavesh Salinas CMA  Certified Medical Assistant to ROBERTO CARLOS Fuentes MD  [facility] [place]

## 2022-03-24 ENCOUNTER — PATIENT MESSAGE (OUTPATIENT)
Dept: PREADMISSION TESTING | Facility: HOSPITAL | Age: 67
End: 2022-03-24
Payer: MEDICARE

## 2022-03-24 ENCOUNTER — PATIENT MESSAGE (OUTPATIENT)
Dept: INTERNAL MEDICINE | Facility: CLINIC | Age: 67
End: 2022-03-24
Payer: MEDICARE

## 2022-03-24 ENCOUNTER — TELEPHONE (OUTPATIENT)
Dept: INTERNAL MEDICINE | Facility: CLINIC | Age: 67
End: 2022-03-24
Payer: MEDICARE

## 2022-03-24 DIAGNOSIS — E78.1 HYPERTRIGLYCERIDEMIA: Primary | ICD-10-CM

## 2022-03-24 RX ORDER — ATORVASTATIN CALCIUM 20 MG/1
20 TABLET, FILM COATED ORAL DAILY
Qty: 90 TABLET | Refills: 3 | Status: SHIPPED | OUTPATIENT
Start: 2022-03-24 | End: 2023-05-22 | Stop reason: SDUPTHER

## 2022-03-24 NOTE — TELEPHONE ENCOUNTER
Please let pt know that I am starting a cholesterol med due to her increased cholesterol. Due to the upcoming surgery, I want to help get her cholesterol down quickly  I want her to take this in evening.   I want to recheck this in 3 months

## 2022-03-24 NOTE — ANESTHESIA PAT ROS NOTE
03/24/2022  Celina Azevedo is a 66 y.o., female.  All information is gathered per Chart review via Epic system only.    Pre-op Assessment          Review of Systems  Social:  Non-Smoker, No Alcohol Use       Planned Procedure: Procedure(s) (LRB):  ARTHROSCOPY, KNEE, WITH MENISCECTOMY (Left)  ARTHROSCOPY, KNEE, WITH CHONDROPLASTY (Left)  Requested Anesthesia Type:General  Surgeon: JEANINE Fuentes MD  Service: Orthopedics  Known or anticipated Date of Surgery:4/1/2022    Previous anesthesia records:None on file    Last PCP note: within 1 month , within [facility]      Cleared for surgery    3/2022  Vent. Rate : 061 BPM     Atrial Rate : 061 BPM      P-R Int : 164 ms          QRS Dur : 080 ms       QT Int : 428 ms       P-R-T Axes : 029 031 034 degrees      QTc Int : 430 ms   Normal sinus rhythm   Normal ECG     5'2  167#  vaccinated

## 2022-03-29 ENCOUNTER — ANESTHESIA EVENT (OUTPATIENT)
Dept: SURGERY | Facility: HOSPITAL | Age: 67
End: 2022-03-29
Payer: MEDICARE

## 2022-03-29 ENCOUNTER — HOSPITAL ENCOUNTER (OUTPATIENT)
Dept: RADIOLOGY | Facility: HOSPITAL | Age: 67
Discharge: HOME | End: 2022-03-29
Attending: PHYSICIAN ASSISTANT
Payer: MEDICARE

## 2022-03-29 ENCOUNTER — OFFICE VISIT (OUTPATIENT)
Dept: SPORTS MEDICINE | Facility: CLINIC | Age: 67
End: 2022-03-29
Payer: MEDICARE

## 2022-03-29 VITALS
DIASTOLIC BLOOD PRESSURE: 85 MMHG | SYSTOLIC BLOOD PRESSURE: 159 MMHG | WEIGHT: 167 LBS | HEIGHT: 62 IN | BODY MASS INDEX: 30.73 KG/M2 | HEART RATE: 61 BPM

## 2022-03-29 DIAGNOSIS — S83.282A TEAR OF LATERAL MENISCUS OF LEFT KNEE: ICD-10-CM

## 2022-03-29 DIAGNOSIS — M25.562 LEFT KNEE PAIN, UNSPECIFIED CHRONICITY: ICD-10-CM

## 2022-03-29 DIAGNOSIS — M25.562 LEFT KNEE PAIN, UNSPECIFIED CHRONICITY: Primary | ICD-10-CM

## 2022-03-29 DIAGNOSIS — M23.201 OLD TEAR OF LATERAL MENISCUS OF LEFT KNEE, UNSPECIFIED TEAR TYPE: ICD-10-CM

## 2022-03-29 PROCEDURE — 1125F AMNT PAIN NOTED PAIN PRSNT: CPT | Mod: CPTII,S$GLB,, | Performed by: PHYSICIAN ASSISTANT

## 2022-03-29 PROCEDURE — 3077F SYST BP >= 140 MM HG: CPT | Mod: CPTII,S$GLB,, | Performed by: PHYSICIAN ASSISTANT

## 2022-03-29 PROCEDURE — 1159F PR MEDICATION LIST DOCUMENTED IN MEDICAL RECORD: ICD-10-PCS | Mod: CPTII,S$GLB,, | Performed by: PHYSICIAN ASSISTANT

## 2022-03-29 PROCEDURE — 3079F PR MOST RECENT DIASTOLIC BLOOD PRESSURE 80-89 MM HG: ICD-10-PCS | Mod: CPTII,S$GLB,, | Performed by: PHYSICIAN ASSISTANT

## 2022-03-29 PROCEDURE — 99999 PR PBB SHADOW E&M-EST. PATIENT-LVL III: ICD-10-PCS | Mod: PBBFAC,,, | Performed by: PHYSICIAN ASSISTANT

## 2022-03-29 PROCEDURE — 77073 BONE LENGTH STUDIES: CPT | Mod: TC

## 2022-03-29 PROCEDURE — 77073 XR HIP TO ANKLE: ICD-10-PCS | Mod: 26,,, | Performed by: RADIOLOGY

## 2022-03-29 PROCEDURE — 1125F PR PAIN SEVERITY QUANTIFIED, PAIN PRESENT: ICD-10-PCS | Mod: CPTII,S$GLB,, | Performed by: PHYSICIAN ASSISTANT

## 2022-03-29 PROCEDURE — 3044F PR MOST RECENT HEMOGLOBIN A1C LEVEL <7.0%: ICD-10-PCS | Mod: CPTII,S$GLB,, | Performed by: PHYSICIAN ASSISTANT

## 2022-03-29 PROCEDURE — 3008F BODY MASS INDEX DOCD: CPT | Mod: CPTII,S$GLB,, | Performed by: PHYSICIAN ASSISTANT

## 2022-03-29 PROCEDURE — 1159F MED LIST DOCD IN RCRD: CPT | Mod: CPTII,S$GLB,, | Performed by: PHYSICIAN ASSISTANT

## 2022-03-29 PROCEDURE — 1160F PR REVIEW ALL MEDS BY PRESCRIBER/CLIN PHARMACIST DOCUMENTED: ICD-10-PCS | Mod: CPTII,S$GLB,, | Performed by: PHYSICIAN ASSISTANT

## 2022-03-29 PROCEDURE — 3044F HG A1C LEVEL LT 7.0%: CPT | Mod: CPTII,S$GLB,, | Performed by: PHYSICIAN ASSISTANT

## 2022-03-29 PROCEDURE — 99999 PR PBB SHADOW E&M-EST. PATIENT-LVL III: CPT | Mod: PBBFAC,,, | Performed by: PHYSICIAN ASSISTANT

## 2022-03-29 PROCEDURE — 77073 BONE LENGTH STUDIES: CPT | Mod: 26,,, | Performed by: RADIOLOGY

## 2022-03-29 PROCEDURE — 99499 NO LOS: ICD-10-PCS | Mod: S$GLB,,, | Performed by: PHYSICIAN ASSISTANT

## 2022-03-29 PROCEDURE — 3079F DIAST BP 80-89 MM HG: CPT | Mod: CPTII,S$GLB,, | Performed by: PHYSICIAN ASSISTANT

## 2022-03-29 PROCEDURE — 3008F PR BODY MASS INDEX (BMI) DOCUMENTED: ICD-10-PCS | Mod: CPTII,S$GLB,, | Performed by: PHYSICIAN ASSISTANT

## 2022-03-29 PROCEDURE — 1160F RVW MEDS BY RX/DR IN RCRD: CPT | Mod: CPTII,S$GLB,, | Performed by: PHYSICIAN ASSISTANT

## 2022-03-29 PROCEDURE — 3077F PR MOST RECENT SYSTOLIC BLOOD PRESSURE >= 140 MM HG: ICD-10-PCS | Mod: CPTII,S$GLB,, | Performed by: PHYSICIAN ASSISTANT

## 2022-03-29 PROCEDURE — 99499 UNLISTED E&M SERVICE: CPT | Mod: S$GLB,,, | Performed by: PHYSICIAN ASSISTANT

## 2022-03-29 RX ORDER — SODIUM CHLORIDE 9 MG/ML
INJECTION, SOLUTION INTRAVENOUS CONTINUOUS
Status: CANCELLED | OUTPATIENT
Start: 2022-03-29

## 2022-03-29 RX ORDER — MUPIROCIN 20 MG/G
OINTMENT TOPICAL
Status: CANCELLED | OUTPATIENT
Start: 2022-03-29

## 2022-03-29 RX ORDER — OXYCODONE HYDROCHLORIDE 5 MG/1
5 TABLET ORAL EVERY 6 HOURS PRN
Qty: 28 TABLET | Refills: 0 | Status: SHIPPED | OUTPATIENT
Start: 2022-03-29 | End: 2022-04-08

## 2022-03-29 RX ORDER — ASPIRIN 81 MG/1
81 TABLET ORAL 2 TIMES DAILY
Qty: 28 TABLET | Refills: 0 | Status: SHIPPED | OUTPATIENT
Start: 2022-03-29 | End: 2023-01-13

## 2022-03-29 RX ORDER — ONDANSETRON 4 MG/1
4 TABLET, FILM COATED ORAL EVERY 8 HOURS PRN
Qty: 21 TABLET | Refills: 0 | Status: SHIPPED | OUTPATIENT
Start: 2022-03-29 | End: 2022-04-08

## 2022-03-29 NOTE — H&P
"Celina Azevedo  is here for a completion of her perioperative paperwork. she  Is scheduled to undergo Left knee arthroscopy with partial lateral meniscectomy, chondroplasty, possible bone spur removal and synovectomy as indicated. on 4/1/22.  She is a healthy individual and does need clearance for this procedure.     PCP clearance by Soraida Mayen [place]: "PT IS MEDICALLY OPTIMIZED FOR LOW RISK PROCEDURE WITH LOW CARDIAC RISK PROFILE AND MAY PROCEED WITH SURGERY WITH DR. GANN ON 4/1/22"    Risks, indications and benefits of the surgical procedure were discussed with the patient. All questions with regard to surgery, rehab, expected return to functional activities, activities of daily living and recreational endeavors were answered to her satisfaction.    Discussed COVID-19 with the patient, they are aware of our current policies and procedures, were given the option of delaying surgery, and they elect to proceed.    Patient was informed and understands the risks of surgery are greater for patients with a current condition or hx of heart disease, obesity, clotting disorders, recurrent infections, steroid use, current or past smoking, and factors such as sedentary lifestyle and noncompliance with medications, therapy or f/u. The degree of the increased risk is hard to estimate w/ any degree of precision.    Once no other questions were asked, a brief history and physical exam was then performed.    PAST MEDICAL HISTORY: History reviewed. No pertinent past medical history.  PAST SURGICAL HISTORY:   Past Surgical History:   Procedure Laterality Date    HYSTERECTOMY       FAMILY HISTORY: History reviewed. No pertinent family history.  SOCIAL HISTORY:   Social History     Socioeconomic History    Marital status:    Tobacco Use    Smoking status: Never Smoker    Smokeless tobacco: Never Used   Substance and Sexual Activity    Alcohol use: No    Drug use: No       MEDICATIONS:   Current Outpatient Medications: "     atorvastatin (LIPITOR) 20 MG tablet, Take 1 tablet (20 mg total) by mouth once daily., Disp: 90 tablet, Rfl: 3    diclofenac sodium (VOLTAREN) 1 % Gel, Apply 2 g topically once daily. (Patient not taking: Reported on 3/8/2022), Disp: 2 Tube, Rfl: 2    ibuprofen (ADVIL,MOTRIN) 800 MG tablet, Take 800 mg by mouth every 6 (six) hours as needed for Pain., Disp: , Rfl:     ibuprofen (ADVIL,MOTRIN) 800 MG tablet, Take 1 tablet (800 mg total) by mouth every 8 (eight) hours as needed., Disp: 30 tablet, Rfl: 1  ALLERGIES: Review of patient's allergies indicates:  No Known Allergies    Review of Systems   Constitution: Negative. Negative for chills, fever and night sweats.   HENT: Negative for congestion and headaches.    Eyes: Negative for blurred vision, left vision loss and right vision loss.   Cardiovascular: Negative for chest pain and syncope.   Respiratory: Negative for cough and shortness of breath.    Endocrine: Negative for polydipsia, polyphagia and polyuria.   Hematologic/Lymphatic: Negative for bleeding problem. Does not bruise/bleed easily.   Skin: Negative for dry skin, itching and rash.   Musculoskeletal: Negative for falls and muscle weakness.   Gastrointestinal: Negative for abdominal pain and bowel incontinence.   Genitourinary: Negative for bladder incontinence and nocturia.   Neurological: Negative for disturbances in coordination, loss of balance and seizures.   Psychiatric/Behavioral: Negative for depression. The patient does not have insomnia.    Allergic/Immunologic: Negative for hives and persistent infections.     PHYSICAL EXAM:  GEN: A&Ox3, WD WN NAD  HEENT: WNL  CHEST: CTAB, no W/R/R  HEART: RRR, no M/R/G   ABD: Soft, NT ND, BS x4 QUADS  MS: Refer to previous note for detailed MS exam  NEURO: CN II-XII intact       The surgical consent was then reviewed with the patient, who agreed with all the contents of the consent form and it was signed.     PHYSICAL THERAPY:  She was also instructed  regarding physical therapy and will begin on POD#1-3. She is doing physical therapy at [facility].    POST OP CARE: Instructions were reviewed including care of the wound and dressing after surgery and when she can shower.     PAIN MANAGEMENT: Celina Azevedo was instructed regarding the Polar ice unit that will be in place after surgery and her postoperative pain medications.     MEDICATION:  Roxicodone 5 mg 1-2 q 4 hours PRN for pain  Zofran 4 mg q 8 hours PRN for nausea and vomiting.  Aspirin 81mg BID x 2 weeks for DVT prophylaxis starting on the evening after surgery.      Post op meds to be delivered bedside prior to discharge. Deliver to family if patient is in surgery at 5pm.     Patient was instructed to purchase and take Colace to counter possible GI side effects of taking opiates.     DVT prophylaxis was discussed with the patient today including risk factors for developing DVTs and history of DVTs. The patient was asked if any specific recommendations were given from the doctor/s that did pre-operative surgical clearance.      If the patient was previously taking 81mg baby aspirin, they were told to not take additional baby aspirin, using the above stated aspirin and to restart the 81mg aspirin daily after completion of the aspirin dose.      Patient was also told to buy over the counter Prilosec medication and take it once daily for GI protection as long as they are taking NSAIDs or Aspirin.     The patient was told that narcotic pain medications may make them drowsy and instructions were given to not sign legal documents, drive or operate heavy machinery, cars, or equipment while under the influence of narcotic medications.     Dr. Fuentes was present in clinic during this pre-op evaluation. The patient was offered the opportunity to ask Dr. Fuentes any further questions regarding the procedure which may not have been addressed during their previous informed consent  discussion. The patient has declined to see Dr. Fuentes today.    As there were no other questions to be asked, she was given my business card along with Dr. Fuentes's business card if she has any questions or concerns prior to surgery or in the postop period.     I explained to following and the patient expressed understanding:  The patient is currently aware of the COVID19 pandemic and that proceeding with their surgical procedure could potentially increase exposure to coronavirus in the community. The patient understands that there is the possibility of delayed or cancelled appts or PT visits in the future. They understand that infection with the coronavirus could complicate their surgery recovery. They are aware of the current policies and procedures of [facility] and the government regarding the pandemic and they were given the option of delaying my surgery. The patient elects to proceed with surgery at this time.      Patient denies having any symptoms such as cough, SOB, fever, loss of taste/smell.     The patient has been scheduled to undergo coronavirus testing on the day prior to surgery.      The patient was instructed to practice strict social distancing, hand washing/hygiene, respiratory hygiene, and cough etiquette from now until 6 weeks following surgery to reduce the risk of brionna coronavirus.

## 2022-03-30 ENCOUNTER — TELEPHONE (OUTPATIENT)
Dept: SPORTS MEDICINE | Facility: CLINIC | Age: 67
End: 2022-03-30
Payer: MEDICARE

## 2022-03-30 NOTE — TELEPHONE ENCOUNTER
Called and spoke with patient Jolly in Syriac to let her know that we will her tomorrow to let her know about arrival time for her surgery.  Patient understand.      ----- Message from Bhavesh Salinas MA sent at 3/30/2022 10:29 AM CDT -----  Regarding: FW: Pt called to speak to the nurse regarding her procedure and date, report time and location and pt would like a call back today  Hey,    Would you mind calling her and letting her know that we will call tomorrow with her arrival time and location?    Thank you lots!!!  ----- Message -----  From: Agatha Jones  Sent: 3/30/2022  10:10 AM CDT  To: Gina Yusuf Staff  Subject: Pt called to speak to the nurse regarding he#    Patient Advice:    Pt called to speak to the nurse regarding her procedure date, report time and location and pt would like a call back today. Pt will need someone who speaks Maltese to call her back.    Pt can be reached at [phone]

## 2022-03-31 ENCOUNTER — TELEPHONE (OUTPATIENT)
Dept: SPORTS MEDICINE | Facility: CLINIC | Age: 67
End: 2022-03-31
Payer: MEDICARE

## 2022-03-31 NOTE — TELEPHONE ENCOUNTER
Called and spoke with patient in Japanese to let her know that her arrival time will be at 5 am at [facility].  Patient understand. She wanted to know if I'm going to be there to translate.  I told her no but their should have one on site.

## 2022-03-31 NOTE — TELEPHONE ENCOUNTER
Called and spoke with patient in Danish to let her if I gave her the right building location (A) not B.  She state that she was their when her  got his surgery.

## 2022-04-01 ENCOUNTER — HOSPITAL ENCOUNTER (OUTPATIENT)
Facility: HOSPITAL | Age: 67
Discharge: HOME | End: 2022-04-01
Attending: ORTHOPAEDIC SURGERY | Admitting: ORTHOPAEDIC SURGERY
Payer: MEDICARE

## 2022-04-01 ENCOUNTER — ANESTHESIA (OUTPATIENT)
Dept: SURGERY | Facility: HOSPITAL | Age: 67
End: 2022-04-01
Payer: MEDICARE

## 2022-04-01 VITALS
RESPIRATION RATE: 14 BRPM | WEIGHT: 167 LBS | HEART RATE: 55 BPM | OXYGEN SATURATION: 98 % | TEMPERATURE: 98 F | DIASTOLIC BLOOD PRESSURE: 72 MMHG | BODY MASS INDEX: 30.73 KG/M2 | HEIGHT: 62 IN | SYSTOLIC BLOOD PRESSURE: 131 MMHG

## 2022-04-01 DIAGNOSIS — S83.282A TEAR OF LATERAL MENISCUS OF LEFT KNEE: ICD-10-CM

## 2022-04-01 DIAGNOSIS — M23.201 OLD TEAR OF LATERAL MENISCUS OF LEFT KNEE, UNSPECIFIED TEAR TYPE: ICD-10-CM

## 2022-04-01 PROCEDURE — D9220A PRA ANESTHESIA: Mod: CRNA,,, | Performed by: NURSE ANESTHETIST, CERTIFIED REGISTERED

## 2022-04-01 PROCEDURE — 71000015 HC POSTOP RECOV 1ST HR: Performed by: ORTHOPAEDIC SURGERY

## 2022-04-01 PROCEDURE — 25000003 PHARM REV CODE 250: Performed by: ORTHOPAEDIC SURGERY

## 2022-04-01 PROCEDURE — 94761 N-INVAS EAR/PLS OXIMETRY MLT: CPT

## 2022-04-01 PROCEDURE — 63600175 PHARM REV CODE 636 W HCPCS: Performed by: SURGERY

## 2022-04-01 PROCEDURE — 27201423 OPTIME MED/SURG SUP & DEVICES STERILE SUPPLY: Performed by: ORTHOPAEDIC SURGERY

## 2022-04-01 PROCEDURE — 37000009 HC ANESTHESIA EA ADD 15 MINS: Performed by: ORTHOPAEDIC SURGERY

## 2022-04-01 PROCEDURE — 99499 NO LOS: ICD-10-PCS | Mod: ,,, | Performed by: PHYSICIAN ASSISTANT

## 2022-04-01 PROCEDURE — D9220A PRA ANESTHESIA: ICD-10-PCS | Mod: CRNA,,, | Performed by: NURSE ANESTHETIST, CERTIFIED REGISTERED

## 2022-04-01 PROCEDURE — 64448 PR NERVE BLOCK INJ, ANES/STEROID, FEMORAL, CONT INFUSION, INCL IMAG GUIDANCE: ICD-10-PCS | Mod: 59,LT,, | Performed by: ANESTHESIOLOGY

## 2022-04-01 PROCEDURE — 63600175 PHARM REV CODE 636 W HCPCS: Performed by: PHYSICIAN ASSISTANT

## 2022-04-01 PROCEDURE — D9220A PRA ANESTHESIA: ICD-10-PCS | Mod: ANES,,, | Performed by: ANESTHESIOLOGY

## 2022-04-01 PROCEDURE — 71000039 HC RECOVERY, EACH ADD'L HOUR: Performed by: ORTHOPAEDIC SURGERY

## 2022-04-01 PROCEDURE — D9220A PRA ANESTHESIA: Mod: ANES,,, | Performed by: ANESTHESIOLOGY

## 2022-04-01 PROCEDURE — 36000710: Performed by: ORTHOPAEDIC SURGERY

## 2022-04-01 PROCEDURE — 64448 NJX AA&/STRD FEM NRV NFS IMG: CPT | Mod: 59,LT,, | Performed by: ANESTHESIOLOGY

## 2022-04-01 PROCEDURE — 27200651 HC AIRWAY, LMA: Performed by: ANESTHESIOLOGY

## 2022-04-01 PROCEDURE — 63600175 PHARM REV CODE 636 W HCPCS: Performed by: ANESTHESIOLOGY

## 2022-04-01 PROCEDURE — 76942 ECHO GUIDE FOR BIOPSY: CPT | Mod: 26,,, | Performed by: ANESTHESIOLOGY

## 2022-04-01 PROCEDURE — 76942 PR U/S GUIDANCE FOR NEEDLE GUIDANCE: ICD-10-PCS | Mod: 26,,, | Performed by: ANESTHESIOLOGY

## 2022-04-01 PROCEDURE — C1751 CATH, INF, PER/CENT/MIDLINE: HCPCS

## 2022-04-01 PROCEDURE — 29881 PR KNEE SCOPE SINGLE MENISECECTOMY: ICD-10-PCS | Mod: LT,,, | Performed by: ORTHOPAEDIC SURGERY

## 2022-04-01 PROCEDURE — 25000003 PHARM REV CODE 250: Performed by: SURGERY

## 2022-04-01 PROCEDURE — 29881 ARTHRS KNE SRG MNISECTMY M/L: CPT | Mod: LT,,, | Performed by: ORTHOPAEDIC SURGERY

## 2022-04-01 PROCEDURE — 99900035 HC TECH TIME PER 15 MIN (STAT)

## 2022-04-01 PROCEDURE — 64448 NJX AA&/STRD FEM NRV NFS IMG: CPT | Performed by: ANESTHESIOLOGY

## 2022-04-01 PROCEDURE — 25000003 PHARM REV CODE 250: Performed by: NURSE ANESTHETIST, CERTIFIED REGISTERED

## 2022-04-01 PROCEDURE — 71000033 HC RECOVERY, INTIAL HOUR: Performed by: ORTHOPAEDIC SURGERY

## 2022-04-01 PROCEDURE — 63600175 PHARM REV CODE 636 W HCPCS: Performed by: NURSE ANESTHETIST, CERTIFIED REGISTERED

## 2022-04-01 PROCEDURE — 36000711: Performed by: ORTHOPAEDIC SURGERY

## 2022-04-01 PROCEDURE — 37000008 HC ANESTHESIA 1ST 15 MINUTES: Performed by: ORTHOPAEDIC SURGERY

## 2022-04-01 PROCEDURE — 99499 UNLISTED E&M SERVICE: CPT | Mod: ,,, | Performed by: PHYSICIAN ASSISTANT

## 2022-04-01 PROCEDURE — 25000003 PHARM REV CODE 250: Performed by: PHYSICIAN ASSISTANT

## 2022-04-01 PROCEDURE — 25000003 PHARM REV CODE 250: Performed by: ANESTHESIOLOGY

## 2022-04-01 RX ORDER — DEXAMETHASONE SODIUM PHOSPHATE 4 MG/ML
INJECTION, SOLUTION INTRA-ARTICULAR; INTRALESIONAL; INTRAMUSCULAR; INTRAVENOUS; SOFT TISSUE
Status: DISCONTINUED | OUTPATIENT
Start: 2022-04-01 | End: 2022-04-01

## 2022-04-01 RX ORDER — LIDOCAINE HYDROCHLORIDE 10 MG/ML
1 INJECTION, SOLUTION EPIDURAL; INFILTRATION; INTRACAUDAL; PERINEURAL ONCE
Status: DISCONTINUED | OUTPATIENT
Start: 2022-04-01 | End: 2022-08-03

## 2022-04-01 RX ORDER — FENTANYL CITRATE 50 UG/ML
25 INJECTION, SOLUTION INTRAMUSCULAR; INTRAVENOUS EVERY 5 MIN PRN
Status: COMPLETED | OUTPATIENT
Start: 2022-04-01 | End: 2022-04-01

## 2022-04-01 RX ORDER — MUPIROCIN 20 MG/G
OINTMENT TOPICAL
Status: DISCONTINUED | OUTPATIENT
Start: 2022-04-01 | End: 2022-04-01 | Stop reason: HOSPADM

## 2022-04-01 RX ORDER — PROPOFOL 10 MG/ML
VIAL (ML) INTRAVENOUS
Status: DISCONTINUED | OUTPATIENT
Start: 2022-04-01 | End: 2022-04-01

## 2022-04-01 RX ORDER — PHENYLEPHRINE HYDROCHLORIDE 10 MG/ML
INJECTION INTRAVENOUS
Status: DISCONTINUED | OUTPATIENT
Start: 2022-04-01 | End: 2022-04-01

## 2022-04-01 RX ORDER — ROPIVACAINE HYDROCHLORIDE 2 MG/ML
INJECTION, SOLUTION EPIDURAL; INFILTRATION; PERINEURAL CONTINUOUS
Status: DISCONTINUED | OUTPATIENT
Start: 2022-04-01 | End: 2022-08-03

## 2022-04-01 RX ORDER — ROPIVACAINE/EPI/CLONIDINE/KET 2.46-0.005
SYRINGE (ML) INJECTION
Status: DISCONTINUED | OUTPATIENT
Start: 2022-04-01 | End: 2022-04-01 | Stop reason: HOSPADM

## 2022-04-01 RX ORDER — ROPIVACAINE HYDROCHLORIDE 5 MG/ML
INJECTION, SOLUTION EPIDURAL; INFILTRATION; PERINEURAL
Status: DISCONTINUED | OUTPATIENT
Start: 2022-04-01 | End: 2022-04-01

## 2022-04-01 RX ORDER — ONDANSETRON 2 MG/ML
INJECTION INTRAMUSCULAR; INTRAVENOUS
Status: DISCONTINUED | OUTPATIENT
Start: 2022-04-01 | End: 2022-04-01

## 2022-04-01 RX ORDER — MIDAZOLAM HYDROCHLORIDE 1 MG/ML
.5-4 INJECTION INTRAMUSCULAR; INTRAVENOUS
Status: DISCONTINUED | OUTPATIENT
Start: 2022-04-01 | End: 2022-08-03

## 2022-04-01 RX ORDER — FAMOTIDINE 10 MG/ML
INJECTION INTRAVENOUS
Status: DISCONTINUED | OUTPATIENT
Start: 2022-04-01 | End: 2022-04-01

## 2022-04-01 RX ORDER — CELECOXIB 200 MG/1
400 CAPSULE ORAL ONCE
Status: COMPLETED | OUTPATIENT
Start: 2022-04-01 | End: 2022-04-01

## 2022-04-01 RX ORDER — CEFAZOLIN SODIUM/WATER 2 G/20 ML
2 SYRINGE (ML) INTRAVENOUS
Status: DISCONTINUED | OUTPATIENT
Start: 2022-04-01 | End: 2022-04-01 | Stop reason: HOSPADM

## 2022-04-01 RX ORDER — LIDOCAINE HCL/PF 100 MG/5ML
SYRINGE (ML) INTRAVENOUS
Status: DISCONTINUED | OUTPATIENT
Start: 2022-04-01 | End: 2022-04-01

## 2022-04-01 RX ORDER — HALOPERIDOL 5 MG/ML
0.5 INJECTION INTRAMUSCULAR EVERY 10 MIN PRN
Status: DISCONTINUED | OUTPATIENT
Start: 2022-04-01 | End: 2022-04-01 | Stop reason: HOSPADM

## 2022-04-01 RX ORDER — FENTANYL CITRATE 50 UG/ML
25-200 INJECTION, SOLUTION INTRAMUSCULAR; INTRAVENOUS
Status: DISCONTINUED | OUTPATIENT
Start: 2022-04-01 | End: 2022-08-03

## 2022-04-01 RX ORDER — SODIUM CHLORIDE 0.9 % (FLUSH) 0.9 %
10 SYRINGE (ML) INJECTION
Status: DISCONTINUED | OUTPATIENT
Start: 2022-04-01 | End: 2022-04-01 | Stop reason: HOSPADM

## 2022-04-01 RX ORDER — METHOCARBAMOL 500 MG/1
1000 TABLET, FILM COATED ORAL 4 TIMES DAILY
Status: DISCONTINUED | OUTPATIENT
Start: 2022-04-01 | End: 2022-04-01 | Stop reason: HOSPADM

## 2022-04-01 RX ORDER — ACETAMINOPHEN 500 MG
1000 TABLET ORAL
Status: COMPLETED | OUTPATIENT
Start: 2022-04-01 | End: 2022-04-01

## 2022-04-01 RX ORDER — SODIUM CHLORIDE 9 MG/ML
INJECTION, SOLUTION INTRAVENOUS CONTINUOUS
Status: DISCONTINUED | OUTPATIENT
Start: 2022-04-01 | End: 2022-04-01 | Stop reason: HOSPADM

## 2022-04-01 RX ORDER — OXYCODONE HYDROCHLORIDE 5 MG/1
5 TABLET ORAL
Status: DISCONTINUED | OUTPATIENT
Start: 2022-04-01 | End: 2022-04-01 | Stop reason: HOSPADM

## 2022-04-01 RX ADMIN — OXYCODONE 5 MG: 5 TABLET ORAL at 08:04

## 2022-04-01 RX ADMIN — FAMOTIDINE 20 MG: 10 INJECTION, SOLUTION INTRAVENOUS at 07:04

## 2022-04-01 RX ADMIN — Medication: at 08:04

## 2022-04-01 RX ADMIN — CELECOXIB 400 MG: 200 CAPSULE ORAL at 06:04

## 2022-04-01 RX ADMIN — FENTANYL CITRATE 50 MCG: 50 INJECTION, SOLUTION INTRAMUSCULAR; INTRAVENOUS at 06:04

## 2022-04-01 RX ADMIN — PROPOFOL 200 MG: 10 INJECTION, EMULSION INTRAVENOUS at 07:04

## 2022-04-01 RX ADMIN — METHOCARBAMOL 1000 MG: 500 TABLET ORAL at 09:04

## 2022-04-01 RX ADMIN — FENTANYL CITRATE 25 MCG: 50 INJECTION INTRAMUSCULAR; INTRAVENOUS at 08:04

## 2022-04-01 RX ADMIN — MUPIROCIN: 20 OINTMENT TOPICAL at 06:04

## 2022-04-01 RX ADMIN — ONDANSETRON 4 MG: 2 INJECTION, SOLUTION INTRAMUSCULAR; INTRAVENOUS at 07:04

## 2022-04-01 RX ADMIN — SODIUM CHLORIDE: 0.9 INJECTION, SOLUTION INTRAVENOUS at 06:04

## 2022-04-01 RX ADMIN — GLYCOPYRROLATE 0.2 MG: 0.2 INJECTION, SOLUTION INTRAMUSCULAR; INTRAVITREAL at 07:04

## 2022-04-01 RX ADMIN — LIDOCAINE HYDROCHLORIDE 100 MG: 20 INJECTION, SOLUTION INTRAVENOUS at 07:04

## 2022-04-01 RX ADMIN — MIDAZOLAM 1 MG: 1 INJECTION INTRAMUSCULAR; INTRAVENOUS at 06:04

## 2022-04-01 RX ADMIN — PHENYLEPHRINE HYDROCHLORIDE 50 MCG: 10 INJECTION INTRAVENOUS at 07:04

## 2022-04-01 RX ADMIN — ROPIVACAINE HYDROCHLORIDE 10 ML: 5 INJECTION, SOLUTION EPIDURAL; INFILTRATION; PERINEURAL at 06:04

## 2022-04-01 RX ADMIN — PHENYLEPHRINE HYDROCHLORIDE 100 MCG: 10 INJECTION INTRAVENOUS at 07:04

## 2022-04-01 RX ADMIN — ACETAMINOPHEN 1000 MG: 500 TABLET ORAL at 06:04

## 2022-04-01 RX ADMIN — DEXAMETHASONE SODIUM PHOSPHATE 8 MG: 4 INJECTION, SOLUTION INTRAMUSCULAR; INTRAVENOUS at 07:04

## 2022-04-01 RX ADMIN — Medication 2 G: at 07:04

## 2022-04-01 NOTE — ANESTHESIA POSTPROCEDURE EVALUATION
Anesthesia Post Evaluation    Patient: Celina Azevedo    Procedure(s) Performed: Procedure(s) (LRB):  ARTHROSCOPY, KNEE, WITH MENISCECTOMY partial lateral (Left)  ARTHROSCOPY, KNEE, WITH CHONDROPLASTY (Left)    Final Anesthesia Type: general      Patient location during evaluation: PACU  Patient participation: Yes- Able to Participate  Level of consciousness: awake and alert  Post-procedure vital signs: reviewed and stable  Pain management: adequate  Airway patency: patent    PONV status at discharge: No PONV  Anesthetic complications: no      Cardiovascular status: blood pressure returned to baseline  Respiratory status: unassisted  Hydration status: euvolemic  Follow-up not needed.          Vitals Value Taken Time   /72 04/01/22 0916   Temp 36.6 °C (97.9 °F) 04/01/22 0915   Pulse 50 04/01/22 0929   Resp 15 04/01/22 0929   SpO2 98 % 04/01/22 0929   Vitals shown include unvalidated device data.      Event Time   Out of Recovery 09:30:00         Pain/Jerome Score: Pain Rating Prior to Med Admin: 8 (4/1/2022  8:59 AM)  Jerome Score: 10 (4/1/2022  8:30 AM)

## 2022-04-01 NOTE — OP NOTE
[facility]   OPERATIVE REPORT   ORTHOPAEDIC SURGERY   PROVIDER: DR. ROBERTO CARLOS GANN    PATIENT INFORMATION   Celina Azevedo 66 y.o. female 1955   MRN: [id number]   LOCATION: [facility]     DATE OF PROCEDURE: 4/1/2022     PREOPERATIVE DIAGNOSES:   Left knee lateral meniscus tear  Left knee chondromalacia     POSTOPERATIVE DIAGNOSES:   Left knee complex lateral meniscus tear  Left knee chondromalacia, grade 2  Left knee synovitis     PROCEDURE PERFORMED:   Left knee arthroscopic partial lateral meniscectomy (CPT 82231)      Left knee arthroscopic chondroplasty   Left knee arthroscopic partial synovectomy    Surgeon(s) and Role:     * JEANINE Gann MD - Primary     * NESSA Bundy - First Assist    Assistant Surgeon's Certification of Necessity:  No qualified resident was available for assistance in this case. My physician's assistant was available for first assistance in this case which was medically necessary.    ANESTHESIA: General with local anesthetic injection (MILTON)    ESTIMATED BLOOD LOSS: 10 cc    IMPLANTS: * No implants in log *     SPECIMENS: None.    COMPLICATIONS: None.     INTRAOPERATIVE COUNTS: Correct.     PROPHYLACTIC IV ANTIBIOTICS: Given per [facility] Protocol.    INDICATIONS FOR PROCEDURE: Celina is a very nice 66-year-old female with history of left knee painful mechanical complaints.  She has continued to have problems despite prior conservative treatment.  MRI has shown a complex lateral meniscus tear with mild chondromalacia.  She does have a prominent lateral tibial bone spur as well.  She has been indicated for arthroscopic intervention and possible bone spur removal.  Full informed consent was obtained prior to proceeding.    PROCEDURE IN DETAIL:  The patient was identified in preop holding. Permit was obtained for the above procedure. IV antibiotic was initiated for prophylaxis and the patient was brought to the operating room.       After Anesthesia was  administered, a Time Out was verbalized with all OR staff agreeing to the patient and procedure.      The left knee and leg were prepped and draped in the usual sterile fashion.      Diagnostic arthroscopy was undertaken after establishing routine anteromedial and anterolateral scope portals.      Significant Findings:  1. Patellofemoral compartment - Grade 2 chondromalacia the lateral patellar facet and distal central trochlea; no significant lateral pericapsular tissue tightness or lateral tilt of the patella  2. Notch - Normal ACL and PCL  3. Medial Compartment - Meniscus, minimal free edge fraying over the posterior horn. Cartilage, small grade 2 chondromalacia over the anterior aspect of the medial femoral condyle   4. Lateral compartment - Meniscus, complex tear of the anterior horn to posterior horn with multiple unstable flaps of torn meniscus.  Cartilage, mild grade 2 chondromalacia of the posterior aspect of the lateral tibial plateau.  There was a visible prominent lateral tibial margin osteophyte which extended from anterior to posterior.  This was seen at the peripheral margin.  5. Loose bodies - None.  6. Other - Mild anterior interval synovitis    Detailed description:     1. Meniscectomy:  An accessory anterior medial portal was used to gain access to the anterior horn lateral meniscus.  A shaver was used to perform partial lateral meniscectomy through both medial portals and also through the anterolateral portal as well.  All unstable flaps of torn meniscus were removed.  In total approximately 60% of the total lateral meniscus volume required resection.  After debridement, the meniscus was carefully inspected and there appeared to be no clear significant impingement of any residual meniscal tissue on the lateral tibial spur.  There also was no apparent impingement of the lateral femoral condyle articular cartilage on the spur as well.  Again the lateral compartment articular cartilage was well  preserved in this case.  As such, decision was made not to remove the lateral tibial osteophyte as it was not to impinge on the residual lateral meniscus.  The arthroscopic shaver also was used to gently debride the posterior horn medial meniscus.  This was minimal.  2. Chondroplasty:  Chondroplasty was performed over the medial femoral condyle, lateral tibial plateau, central trochlea, and lateral facet patella.  An accessory superior lateral portal was used to gain access to the undersurface of the patella.  All unstable flaps of articular cartilage were removed.    3. Partial synovectomy:  The arthroscopic shaver and thermal ablator devices were used to remove all synovitis.  Care was taken to maintain hemostasis throughout.  Infrapatellar release was performed.    Photos were taken. The portals were closed in standard fashion using 3-0 Nylon suture.    The dressing was placed after local was administered subcutaneously and the patient was awakened and transferred to the recovery room in satisfactory condition.  There were no apparent complications.     POSTOPERATIVE PLAN: Patient may weight bear as tolerates on crutches. Full range of motion to tolerance. Will start physical therapy right away. ASA 81 mg BID x 2 weeks for DVT prophylaxis.

## 2022-04-01 NOTE — PLAN OF CARE
Patient ready to be discharged. VSS and in no distress.    Instructions given via STRATUS, patient AND SPOUSE verbalizes understanding. Pain assessed and addressed. Tolerates liquids by mouth with no nausea and vomiting.     [facility] MD in French. Opportunities for questions given. Contract signed.     Medications delivered bedside. DME given.

## 2022-04-01 NOTE — ANESTHESIA PROCEDURE NOTES
Intubation    Date/Time: 4/1/2022 7:16 AM  Performed by: Navya Asher CRNA  Authorized by: Young Jenkins MD     Intubation:     Induction:  Intravenous    Intubated:  Postinduction    Mask Ventilation:  Easy mask    Attempts:  1    Attempted By:  CRNA    Difficult Airway Encountered?: No      Airway Device:  Supraglottic airway/LMA    Airway Device Size:  3.5    Placement Verified By:  Capnometry    Complicating Factors:  None    Findings Post-Intubation:  BS equal bilateral    
Left adductor canal catheter    Patient location during procedure: pre-op   Block not for primary anesthetic.  Reason for block: at surgeon's request and post-op pain management   Post-op Pain Location: Left knee pain   Start time: 4/1/2022 6:30 AM  Timeout: 4/1/2022 6:30 AM   End time: 4/1/2022 6:45 AM    Staffing  Authorizing Provider: Young Jenkins MD  Performing Provider: Young Jenkins MD    Preanesthetic Checklist  Completed: patient identified, IV checked, site marked, risks and benefits discussed, surgical consent, monitors and equipment checked, pre-op evaluation and timeout performed  Peripheral Block  Patient position: supine  Prep: ChloraPrep and site prepped and draped  Patient monitoring: heart rate, cardiac monitor, continuous pulse ox, continuous capnometry and frequent blood pressure checks  Block type: adductor canal  Laterality: left  Injection technique: continuous  Needle  Needle type: Tuohy   Needle gauge: 17 G  Needle length: 3.5 in  Needle localization: anatomical landmarks and ultrasound guidance  Catheter type: spring wound  Catheter size: 19 G  Test dose: lidocaine 1.5% with Epi 1-to-200,000 and negative   -ultrasound image captured on disc.  Assessment  Injection assessment: negative aspiration, negative parasthesia and local visualized surrounding nerve  Paresthesia pain: none  Heart rate change: no  Slow fractionated injection: yes  Pain Tolerance: comfortable throughout block and no complaints  Medications:    Medications: ropivacaine (NAROPIN) injection 0.5% - Perineural   10 mL - 4/1/2022 6:45:00 AM    Additional Notes  VSS.  [facility] RN monitoring vitals throughout procedure.  Patient tolerated procedure well.     20 mL ropivacaine 0.25% w/ epi 1:200k        
Yes

## 2022-04-01 NOTE — ANESTHESIA PREPROCEDURE EVALUATION
04/01/2022  Celina Azevedo is a 66 y.o., female.      Pre-op Assessment    I have reviewed the Patient Summary Reports.     I have reviewed the Nursing Notes. I have reviewed the NPO Status.   I have reviewed the Medications.     Review of Systems  Anesthesia Hx:  No problems with previous Anesthesia  Denies Family Hx of Anesthesia complications.   Denies Personal Hx of Anesthesia complications.   Social:  Non-Smoker    Hematology/Oncology:  Hematology Normal        Cardiovascular:   Exercise tolerance: good  Functional Capacity good / => 4 METS    Pulmonary:  Pulmonary Normal Denies Pneumonia  Denies COPD.    Renal/:  Renal/ Normal     Hepatic/GI:  Hepatic/GI Normal    Musculoskeletal:   Arthritis     Endocrine:  Endocrine Normal Denies Diabetes.        Physical Exam  General: Well nourished    Airway:  Mallampati: II   Mouth Opening: Normal  Neck ROM: Normal ROM    Dental:  Intact    Chest/Lungs:  Clear to auscultation, Normal Respiratory Rate    Heart:  Rate: Normal  Rhythm: Regular Rhythm    Abdomen:  Normal        Anesthesia Plan  Type of Anesthesia, risks & benefits discussed:    Anesthesia Type: Gen ETT, Gen Supraglottic Airway, MAC, Regional  Intra-op Monitoring Plan: Standard ASA Monitors  Post Op Pain Control Plan: multimodal analgesia, peripheral nerve block and IV/PO Opioids PRN  Induction:  IV  Airway Plan: , Awake  ASA Score: 2    Ready For Surgery From Anesthesia Perspective.     .

## 2022-04-01 NOTE — BRIEF OP NOTE
Certification of Assistant at Surgery       Surgery Date: 4/1/2022     Participating Surgeons:  Surgeon(s) and Role:     * JEANINE Fuentes MD - Primary    Procedures:  Procedure(s) (LRB):  ARTHROSCOPY, KNEE, WITH MENISCECTOMY partial lateral (Left)  ARTHROSCOPY, KNEE, WITH CHONDROPLASTY (Left)    Assistant Surgeon's Certification of Necessity:  I understand that section 1842 (b) (6) (d) of the Social Security Act generally prohibits Medicare Part B reasonable charge payment for the services of assistants at surgery in teaching hospitals when qualified residents are available to furnish such services. I certify that the services for which payment is claimed were medically necessary, and that no qualified resident was available to perform the services. I further understand that these services are subject to post-payment review by the Medicare carrier.      Teddy Valentin PA-C    04/01/2022  8:24 AM

## 2022-04-01 NOTE — TRANSFER OF CARE
"Anesthesia Transfer of Care Note    Patient: Celina Azevedo    Procedure(s) Performed: Procedure(s) (LRB):  ARTHROSCOPY, KNEE, WITH MENISCECTOMY partial lateral (Left)  ARTHROSCOPY, KNEE, WITH CHONDROPLASTY (Left)    Patient location: PACU    Anesthesia Type: general    Transport from OR: Transported from OR on 6-10 L/min O2 by face mask with adequate spontaneous ventilation    Post pain: adequate analgesia    Post assessment: no apparent anesthetic complications    Post vital signs: stable    Level of consciousness: awake    Nausea/Vomiting: no nausea/vomiting    Complications: none    Transfer of care protocol was followed      Last vitals:   Visit Vitals  /67   Pulse 81   Temp 36.9 °C (98.4 °F) (Oral)   Resp 16   Ht 5' 2" (1.575 m)   Wt 75.8 kg (167 lb)   SpO2 100%   Breastfeeding No   BMI 30.54 kg/m²     "

## 2022-04-01 NOTE — PLAN OF CARE
Pre op complete. Questions answered. Resting comfortably. Call light in reach. Personal belongings placed in locker.

## 2022-04-01 NOTE — BRIEF OP NOTE
[facility] - [facility] (Hospital)  Brief Operative Note    Surgery Date: 4/1/2022     Surgeon(s) and Role:     * JEANINE Fuentes MD - Primary    Assisting Surgeon: None    Pre-op Diagnosis:  Old tear of lateral meniscus of left knee, unspecified tear type [M23.201]  Chondromalacia, left knee [M94.262]    Post-op Diagnosis:  Post-Op Diagnosis Codes:     * Old tear of lateral meniscus of left knee, unspecified tear type [M23.201]     * Chondromalacia, left knee [M94.262]    Procedure(s) (LRB):  ARTHROSCOPY, KNEE, WITH MENISCECTOMY (Left)  ARTHROSCOPY, KNEE, WITH CHONDROPLASTY (Left)    Anesthesia: General    Operative Findings: Old tear of lateral meniscus of left knee, unspecified tear type [M23.201]  Chondromalacia, left knee [M94.262]      Estimated Blood Loss: * No values recorded between 4/1/2022 12:00 AM and 4/1/2022  6:41 AM *         Specimens:   Specimen (24h ago, onward)            None            Discharge Note    OUTCOME: Patient tolerated treatment/procedure well without complication and is now ready for discharge.    DISPOSITION: Home or Self Care    FINAL DIAGNOSIS:  Old tear of lateral meniscus of left knee, unspecified tear type [M23.201]  Chondromalacia, left knee [M94.262]      FOLLOWUP: In clinic    DISCHARGE INSTRUCTIONS:  No discharge procedures on file.

## 2022-04-03 NOTE — BRIEF OP NOTE
OUTPATIENT ORTHOPAEDIC SURGERY    Brief Operative Note       Surgery Date: 4/1/2022     Pre-op Diagnosis:  Old tear of lateral meniscus of left knee, unspecified tear type [M23.201]  Chondromalacia, left knee [M94.262]    Post-op Diagnosis: Old tear of lateral meniscus of left knee, unspecified tear type [M23.201]  Chondromalacia, left knee [M94.262]    Procedures: Procedure(s) (LRB):  ARTHROSCOPY, KNEE, WITH MENISCECTOMY partial lateral (Left)  ARTHROSCOPY, KNEE, WITH CHONDROPLASTY (Left)    Surgeon: Surgeon(s) and Role:     * JEANINE Fuentes MD - Primary    Anesthesia: General    Findings/Key Components: See op note    Core Measure Documentation:  Were antibiotics extended? No  Was the patient administered a VTE Prophylaxis? No. Short procedure; low risk  Estimated Blood Loss: minimal           Specimens (From admission, onward)    None        Implants: * No implants in log *    Complications: none           Disposition: PACU - hemodynamically stable.           Condition: Stable    Attestation:  I was present for the entire procedure.    Discharge Note      SUMMARY     Admit Date: 4/1/2022    Attending Physician: No att. providers found     Discharge Date: 04/03/2022    Final Diagnosis: please see operative report    Hospital Course of Care: Patient underwent elective surgery surgery and was transferred to PACU in stable condition.  In PACU, patient received appropriate post-operative care and was transferred to medical floor for neurovascular monitoring and pain control.  There patient progressed appropriately. Once met anesthesia derived discharge criteria, the patient was discharged home with plans for physical therapy and follow-up with the operative surgeon.    Disposition: Home or Self Care    Patient Instructions:   Discharge Medication List as of 4/1/2022  9:03 AM      CONTINUE these medications which have NOT CHANGED    Details   aspirin (ECOTRIN) 81 MG EC tablet Take 1 tablet (81 mg total) by mouth 2  (two) times daily. for 14 days, Starting Tue 3/29/2022, Until Tue 4/12/2022, Normal      atorvastatin (LIPITOR) 20 MG tablet Take 1 tablet (20 mg total) by mouth once daily., Starting Thu 3/24/2022, Until Fri 3/24/2023, Normal      diclofenac sodium (VOLTAREN) 1 % Gel Apply 2 g topically once daily., Starting Mon 3/29/2021, Normal      ondansetron (ZOFRAN) 4 MG tablet Take 1 tablet (4 mg total) by mouth every 8 (eight) hours as needed., Starting Tue 3/29/2022, Until Tue 4/5/2022 at 2359, Normal      oxyCODONE (ROXICODONE) 5 MG immediate release tablet Take 1 tablet (5 mg total) by mouth every 6 (six) hours as needed., Starting Tue 3/29/2022, Until Tue 4/5/2022 at 2359, Normal         STOP taking these medications       ibuprofen (ADVIL,MOTRIN) 800 MG tablet Comments:   Reason for Stopping:         ibuprofen (ADVIL,MOTRIN) 800 MG tablet Comments:   Reason for Stopping:               Discharge Procedure Orders (must include Diet, Follow-up, Activity)   Discharge Procedure Orders (must include Diet, Follow-up, Activity)   Diet Adult Regular     Ice to affected area     Notify your health care provider if you experience any of the following:  temperature >100.4     Notify your health care provider if you experience any of the following:  persistent nausea and vomiting or diarrhea     Notify your health care provider if you experience any of the following:  severe uncontrolled pain     Notify your health care provider if you experience any of the following:  redness, tenderness, or signs of infection (pain, swelling, redness, odor or green/yellow discharge around incision site)      Remove dressing in 72 hours        Activity: Per printed postoperative instructions.     Diet: Resume pre-surgery diet.    Follow-up: 2 weeks with Dr. Cuba Fuentes

## 2022-04-03 NOTE — ANESTHESIA POST-OP PAIN MANAGEMENT
Unable to reach patient for [facility] follow up. Voicemail left on patient's cell number encouraging to contact anesthesia or [facility] 24/7 support line for any questions or concerns about the nerve block or infusion pump. Will continue to follow up.        Pau Gao MD  Regional Anesthesiology and Acute Pain Medicine  [facility]  SpectraLink # [id number]  04/03/2022  11:03 AM

## 2022-04-04 ENCOUNTER — CLINICAL SUPPORT (OUTPATIENT)
Dept: REHABILITATION | Facility: HOSPITAL | Age: 67
End: 2022-04-04
Payer: MEDICARE

## 2022-04-04 DIAGNOSIS — M23.201 OLD TEAR OF LATERAL MENISCUS OF LEFT KNEE, UNSPECIFIED TEAR TYPE: ICD-10-CM

## 2022-04-04 DIAGNOSIS — M25.562 LEFT KNEE PAIN, UNSPECIFIED CHRONICITY: ICD-10-CM

## 2022-04-04 PROCEDURE — 97110 THERAPEUTIC EXERCISES: CPT

## 2022-04-04 PROCEDURE — 97161 PT EVAL LOW COMPLEX 20 MIN: CPT

## 2022-04-05 NOTE — ANESTHESIA POST-OP PAIN MANAGEMENT
Acute Pain Service Progress Note    4/5/2022 1322    Attempted to contact patient using  provided through [facility]'s language line. Voicemail left on patient's cell and home number reminding to discontinue her nimbus infusion by tomorrow at noon as instructed at discharge. Encouraged patient to contact anesthesia or the [facility] 24/7 support line for any questions/concerns.

## 2022-04-05 NOTE — PLAN OF CARE
[facility]  Physical Therapy Initial Evaluation    Name: Celina Azevedo  Clinic Number: [id number]    Therapy Diagnosis:   Encounter Diagnoses   Name Primary?    Left knee pain, unspecified chronicity     Old tear of lateral meniscus of left knee, unspecified tear type      Physician: Teddy Valentin, PA*    Physician Orders: PT Eval and Treat  Medical Diagnosis from Referral:   M25.562 (ICD-10-CM) - Left knee pain, unspecified chronicity   M23.201 (ICD-10-CM) - Old tear of lateral meniscus of left knee, unspecified tear type       Evaluation Date: 4/4/2022  Authorization Period Expiration: 3/29/2023  Plan of Care Expiration: 12/31/2021  Visit # / Visits authorized: 1/ 1    Time In: 1210  Time Out: 1300  Total Billable Time: 50 minutes    DOS: 4/1/2022  S/p: ARTHROSCOPY, KNEE, WITH MENISCECTOMY partial lateral (Left)  ARTHROSCOPY, KNEE, WITH CHONDROPLASTY (Left)  Post-Op Precautions: Patient may weight bear as tolerates on crutches. Full range of motion to tolerance. Will start physical therapy right away. ASA 81 mg BID x 2 weeks for DVT prophylaxis.    Precautions: Standard    Subjective     Date of onset: a few months ago; recent surgery  History of current condition - Celina reports: she fell in her driveway and twisted her knee causing signifcant knee pain. Due to continued pain and dysfunction the patient underwent surgery 3 days ago on 4/1/2022. The patient notes that she has kept her leg elevated, has been icing and was compliant with her bandages and has not removed them. The patient notes that she has been using the wheelchair to get around but has crutches but doesn't know how to use the,.      Imaging see imaging     Prior Therapy: None   Exercise Routine/Sport Participation: walking   Social History: lives at home with    Occupation: na   Prior Level of Function: unrestricted, no knee pain   Current Level of Function: sig post operative restrictiosn      Pain:  Current 5/10, worst 7/10, best 3/10   Location: left knee   Description: Aching, Dull and Tight  Aggravating Factors: Sitting, Standing, Bending, Walking, Night Time, Morning, Extension, Flexing, Lifting and Getting out of bed/chair  Easing Factors: pain medication and ice    Pts goals: return to PLOF, walking       Medical History:   No past medical history on file.    Surgical History:   Celina Azevedo  has a past surgical history that includes Hysterectomy; Knee arthroscopy w/ meniscectomy (Left, 4/1/2022); and Arthroscopic chondroplasty of knee joint (Left, 4/1/2022).    Medications:   Celina has a current medication list which includes the following prescription(s): aspirin, atorvastatin, diclofenac sodium, ondansetron, and oxycodone, and the following Facility-Administered Medications: fentanyl, lidocaine (pf) 10 mg/ml (1%), midazolam, and ropivacaine (pf) 2 mg/ml (0.2%).    Allergies:   Review of patient's allergies indicates:  No Known Allergies     Objective     Observation: pt arrived in wheel chair with leg wrapped in bandages and ACE wearing compression sleeve and in no apparent distress    Gait: Antalgic with B axillary crutches     Knee Active Range of Motion:   Right  Left    Flexion 130 40   Extension 0 0     Knee Passive Range of Motion:   Right  Left    Flexion 130 95   Extension 5 5       Ankle Active Range of Motion: WNL      Quad Set: Good       Joint Mobility: mild dec in PFJ glides all directions        Palpation: ttp at PFJ and joint line knee, no ttp at calf       Sensation: intact      Pulses: intact   Special Tests: Not performed today due to post-op status   Strength: Not performed today due to post-op status.        Treatment     Treatment Time In: 1230  Treatment Time Out: 1300  Total Treatment time separate from Evaluation: 30 minutes     Celina received the treatments listed below:      Therapeutic exercises to develop strength and ROM for 30 minutes including:  Heel  Slides 5m   Quad Sets 5m   Gait training with B axillary crutches x8m   Heel prop 5m   Education        Home Exercises and Patient Education Provided     Education provided:   - Walking with crutches, compression sleeve x 2 weeks   - Ice prn for pain   - Exercises 4x a day   - Prognosis, activity modification, goals for therapy, role of therapy for care, exercises/HEP    Written Home Exercises Provided: yes.  Exercises were reviewed and Celina was able to demonstrate them prior to the end of the session.   Pt received a written copy of exercises to perform at home. Celina demonstrated good  understanding of the education provided.     See EMR under patient instructions for exercises given.     Assessment     Celina is a 66 y.o. female referred to outpatient Physical Therapy 3 days s/p mensiectomy on 4/1/2022. The patient with post operative pain in L knee, decreased L knee ROM, dec neuroms control, alteration in gait mechanics and balance as well as dec tolernace to ADLs and IADLs       Pt will benefit from skilled outpatient Physical Therapy to address the deficits stated above and in the chart below, provide pt/family education, and to maximize pt's level of independence. Pt prognosis is Good.     Plan of care discussed with patient: Yes  Pt's spiritual, cultural and educational needs considered and patient is agreeable to the plan of care and goals as stated below:       Anticipated Barriers for therapy: Language Barrier-  used for the session       Medical Necessity is demonstrated by the following  History  Co-morbidities and personal factors that may impact the plan of care Co-morbidities:   high BMI    Personal Factors:   no deficits     low   Examination  Body Structures and Functions, activity limitations and participation restrictions that may impact the plan of care Body Regions:   lower extremities  trunk    Body Systems:    gross symmetry  ROM  strength  gross coordinated  movement  balance  gait  transfers  transitions  motor control  motor learning  edema  scar formation    Participation Restrictions:   None     Activity limitations:   Learning and applying knowledge  No deficit    General Tasks and Commands  No deficit    Communication  No deficit    Mobility  lifting and carrying objects  walking  driving (bike, car, motorcycle)    Self care  Dressing   Grooming     Domestic Life  Cooking   Cleaning     Interactions/Relationships  No deficit    Life Areas  Recreational Activities to A with health and wellness     Community and Social Life  No deficit          moderate   Clinical Presentation stable and uncomplicated low   Decision Making/ Complexity Score: low     Goals:  Short Term Goals: 8-10 weeks  1. Pt will be compliant with HEP 50% of prescribed amount.   2. The pt to demo improvement in L knee ROM to equal R knee ROM pain free   3.  The pt to demo good quad set with proper hyperextension moment of the L knee   4. The pt to demo ambualting with elast restricitve AD witout major compensatory pattern L knee for at least 20 feet      Long Term Goals: 30 weeks   1. Pt will be compliant with % of prescribed amount.   2. The pt to demo pain free and uncompensated walking mechanics x10 min on an indoor treadmill  3. The pt to demo tolerance to a sit to stand 10x without HHA with uncompensated mechanics x10   4. The pt will report full participation in ADLs and IADLs without restrictions related to L knee.       Plan   Plan of care Certification: 4/4/2022 to 12/31/2022.    Outpatient Physical Therapy 2 times weekly for 30 weeks to include the following interventions: Gait Training, Manual Therapy, Moist Heat/ Ice, Neuromuscular Re-ed, Patient Education, Therapeutic Activities and Therapeutic Exercise.     Isabel Tineo, PT , DPT, SCS, FAAMOMPT

## 2022-04-11 ENCOUNTER — CLINICAL SUPPORT (OUTPATIENT)
Dept: REHABILITATION | Facility: HOSPITAL | Age: 67
End: 2022-04-11
Payer: MEDICARE

## 2022-04-11 DIAGNOSIS — M25.562 CHRONIC PAIN OF LEFT KNEE: Primary | ICD-10-CM

## 2022-04-11 DIAGNOSIS — G89.29 CHRONIC PAIN OF LEFT KNEE: Primary | ICD-10-CM

## 2022-04-11 PROCEDURE — 97140 MANUAL THERAPY 1/> REGIONS: CPT | Mod: CQ

## 2022-04-11 PROCEDURE — 97110 THERAPEUTIC EXERCISES: CPT | Mod: CQ

## 2022-04-11 PROCEDURE — 97116 GAIT TRAINING THERAPY: CPT | Mod: CQ

## 2022-04-11 NOTE — PROGRESS NOTES
Physical Therapy Daily Treatment Note     Name: Celina Azevedo  Clinic Number: [id number]    Therapy Diagnosis:   Encounter Diagnosis   Name Primary?    Chronic pain of left knee Yes     Physician: Teddy Valentin PA*    Visit Date: 4/11/2022  Physician Orders: PT Eval and Treat  Medical Diagnosis from Referral:   M25.562 (ICD-10-CM) - Left knee pain, unspecified chronicity   M23.201 (ICD-10-CM) - Old tear of lateral meniscus of left knee, unspecified tear type         Evaluation Date: 4/4/2022  Authorization Period Expiration: 3/29/2023  Plan of Care Expiration: 12/31/2021  Visit #/Visits authorized: 1/ 20     Time In: 1105  Time Out: 1200  Total Billable Time: 55 minutes    Precautions: Standard    Subjective     Pt reports: I dont think I need the crutches.  She was compliant with home exercise program.  Response to previous treatment: none  Functional change: improved ROM    Pain: 0/10  Location: left knee      Objective   AROM:  Ext: 2 hyper  FL: 115    Celina received therapeutic exercises to develop strength, endurance, ROM and core stabilization for 15 minutes including:  Heel prop ext S #3 above/below knee x 5 min  QS x 3 min   SLR x 3 min  Heel slides x 5 min      Celina participated in neuromuscular re-education activities to improve: Balance, Coordination, Kinesthetic and Proprioception for 00 minutes. The following activities were included:      Celina participated in dynamic functional therapeutic activities to improve functional performance for 00  minutes, including:      Celina participated in gait training to improve functional mobility and safety for 32  minutes, including:    Amb B crutches  Amb single crutch    received the following manual therapy techniques: Joint mobilizations and Soft tissue Mobilization were applied to the: L knee for 08 minutes, including:  Patella mobs  AP femoral mobs  Ext hinge mobs    Home Exercises Provided and Patient Education Provided     Education  provided:   - Cont HEP    Written Home Exercises Provided: Patient instructed to cont prior HEP.  Exercises were reviewed and Celina was able to demonstrate them prior to the end of the session.  Celina demonstrated good  understanding of the education provided.     See EMR under Patient Instructions for exercises provided prior visit.    Assessment   Amb into session w/ B crutches w/ decreased TKE. Quad strength and ROM is improving. No ext lag w/ SLR w/ more reps. Pain at end range of knee FL but minimal. Swelling superior patella. Ed pt on the need to cont to use 1 crutch 2* not able to perform TKE 2* quad weakness. Pt challenging w/ sequencing crutch w/ LLE when walking.  Celina Is progressing well towards her goals.   Pt prognosis is Good.     Pt will continue to benefit from skilled outpatient physical therapy to address the deficits listed in the problem list box on initial evaluation, provide pt/family education and to maximize pt's level of independence in the home and community environment.     Pt's spiritual, cultural and educational needs considered and pt agreeable to plan of care and goals.    Anticipated barriers to physical therapy: Language Barrier    Goals: Short Term Goals: 8-10 weeks  1. Pt will be compliant with HEP 50% of prescribed amount.   2. The pt to demo improvement in L knee ROM to equal R knee ROM pain free   3.  The pt to demo good quad set with proper hyperextension moment of the L knee   4. The pt to demo ambualting with elast restricitve AD witout major compensatory pattern L knee for at least 20 feet      Long Term Goals: 30 weeks   1. Pt will be compliant with % of prescribed amount.   2. The pt to demo pain free and uncompensated walking mechanics x10 min on an indoor treadmill  3. The pt to demo tolerance to a sit to stand 10x without HHA with uncompensated mechanics x10   4. The pt will report full participation in ADLs and IADLs without restrictions related to L  knee.       Plan     Cont w/ POC focusing on qaud strength and ROM    Felipa Higgins, PTA

## 2022-04-13 ENCOUNTER — CLINICAL SUPPORT (OUTPATIENT)
Dept: REHABILITATION | Facility: HOSPITAL | Age: 67
End: 2022-04-13
Payer: MEDICARE

## 2022-04-13 DIAGNOSIS — M25.562 CHRONIC PAIN OF LEFT KNEE: Primary | ICD-10-CM

## 2022-04-13 DIAGNOSIS — G89.29 CHRONIC PAIN OF LEFT KNEE: Primary | ICD-10-CM

## 2022-04-13 PROCEDURE — 97110 THERAPEUTIC EXERCISES: CPT | Mod: CQ

## 2022-04-13 PROCEDURE — 97140 MANUAL THERAPY 1/> REGIONS: CPT | Mod: CQ

## 2022-04-13 PROCEDURE — 97530 THERAPEUTIC ACTIVITIES: CPT | Mod: CQ

## 2022-04-13 NOTE — PROGRESS NOTES
"Physical Therapy Daily Treatment Note     Name: Celina Azevedo  Clinic Number: [id number]    Therapy Diagnosis:   No diagnosis found.  Physician: Teddy Valentin PA*    Visit Date: 4/13/2022  Physician Orders: PT Eval and Treat  Medical Diagnosis from Referral:   M25.562 (ICD-10-CM) - Left knee pain, unspecified chronicity   M23.201 (ICD-10-CM) - Old tear of lateral meniscus of left knee, unspecified tear type         Evaluation Date: 4/4/2022  Authorization Period Expiration: 3/29/2023  Plan of Care Expiration: 12/31/2021  Visit #/Visits authorized: 2/ 20     Time In: 1100  Time Out: 1200  Total Billable Time: 60 minutes    Precautions: Standard    Subjective     Pt reports: no knee pain w/o crutches. Wants to know when she can stop using crutches  She was compliant with home exercise program.  Response to previous treatment: none  Functional change: improved ROM    Pain: 0/10  Location: left knee      Objective   AROM:  Ext: 0 hyper  FL: 120    Celina received therapeutic exercises to develop strength, endurance, ROM and core stabilization for 37 minutes including:  Bike x 10 min lvl 4  Heel prop ext S #3 above/below knee x 5 min  QS x 3 min   SLR x 3 min  Heel slides x 5 min  Standing TKE w/ red crossfit band x 3 min    Celina participated in neuromuscular re-education activities to improve: Balance, Coordination, Kinesthetic and Proprioception for 00 minutes. The following activities were included:      Celina participated in dynamic functional therapeutic activities to improve functional performance for 10 minutes, including:  Step ups 6" 3x10  STS 20" 4x10    Celina participated in gait training to improve functional mobility and safety for 05 minutes, including:  Walking w/o crutch    received the following manual therapy techniques: Joint mobilizations and Soft tissue Mobilization were applied to the: L knee for 08 minutes, including:  Patella mobs  AP femoral mobs  Ext hinge mobs    Home " Exercises Provided and Patient Education Provided     Education provided:   - Cont HEP    Written Home Exercises Provided: Patient instructed to cont prior HEP.  Exercises were reviewed and Celina was able to demonstrate them prior to the end of the session.  Celina demonstrated good  understanding of the education provided.     See EMR under Patient Instructions for exercises provided prior visit.    Assessment   Celina came in w/ decreased knee ext but was able to regain after LLLD stretch. Pt ed to perform knee ext stretch 4-5x/day followed by quad sets. No ext lag w/ SLR. Pt able to amb w/o crutches w/ good quad control. Pt was rosario to go back to crutches if she experiences increased pain for feeling of knee giving out. Able to progress w/ WB exercise today w/ good rosario.  Celina Is progressing well towards her goals.   Pt prognosis is Good.     Pt will continue to benefit from skilled outpatient physical therapy to address the deficits listed in the problem list box on initial evaluation, provide pt/family education and to maximize pt's level of independence in the home and community environment.     Pt's spiritual, cultural and educational needs considered and pt agreeable to plan of care and goals.    Anticipated barriers to physical therapy: Language Barrier    Goals: Short Term Goals: 8-10 weeks  1. Pt will be compliant with HEP 50% of prescribed amount.   2. The pt to demo improvement in L knee ROM to equal R knee ROM pain free   3.  The pt to demo good quad set with proper hyperextension moment of the L knee   4. The pt to demo ambualting with elast restricitve AD witout major compensatory pattern L knee for at least 20 feet      Long Term Goals: 30 weeks   1. Pt will be compliant with % of prescribed amount.   2. The pt to demo pain free and uncompensated walking mechanics x10 min on an indoor treadmill  3. The pt to demo tolerance to a sit to stand 10x without HHA with uncompensated  mechanics x10   4. The pt will report full participation in ADLs and IADLs without restrictions related to L knee.       Plan     Cont w/ POC focusing on qaud strength and ROM    Felipa Higgins, PTA

## 2022-04-14 ENCOUNTER — OFFICE VISIT (OUTPATIENT)
Dept: SPORTS MEDICINE | Facility: CLINIC | Age: 67
End: 2022-04-14
Payer: MEDICARE

## 2022-04-14 VITALS
HEIGHT: 62 IN | HEART RATE: 76 BPM | SYSTOLIC BLOOD PRESSURE: 143 MMHG | WEIGHT: 167.13 LBS | BODY MASS INDEX: 30.76 KG/M2 | DIASTOLIC BLOOD PRESSURE: 88 MMHG

## 2022-04-14 DIAGNOSIS — M25.562 LEFT KNEE PAIN, UNSPECIFIED CHRONICITY: ICD-10-CM

## 2022-04-14 DIAGNOSIS — Z09 SURGERY FOLLOW-UP EXAMINATION: Primary | ICD-10-CM

## 2022-04-14 PROCEDURE — 1101F PR PT FALLS ASSESS DOC 0-1 FALLS W/OUT INJ PAST YR: ICD-10-PCS | Mod: CPTII,S$GLB,, | Performed by: PHYSICIAN ASSISTANT

## 2022-04-14 PROCEDURE — 1160F RVW MEDS BY RX/DR IN RCRD: CPT | Mod: CPTII,S$GLB,, | Performed by: PHYSICIAN ASSISTANT

## 2022-04-14 PROCEDURE — 1126F PR PAIN SEVERITY QUANTIFIED, NO PAIN PRESENT: ICD-10-PCS | Mod: CPTII,S$GLB,, | Performed by: PHYSICIAN ASSISTANT

## 2022-04-14 PROCEDURE — 1159F PR MEDICATION LIST DOCUMENTED IN MEDICAL RECORD: ICD-10-PCS | Mod: CPTII,S$GLB,, | Performed by: PHYSICIAN ASSISTANT

## 2022-04-14 PROCEDURE — 3288F FALL RISK ASSESSMENT DOCD: CPT | Mod: CPTII,S$GLB,, | Performed by: PHYSICIAN ASSISTANT

## 2022-04-14 PROCEDURE — 99024 PR POST-OP FOLLOW-UP VISIT: ICD-10-PCS | Mod: S$GLB,,, | Performed by: PHYSICIAN ASSISTANT

## 2022-04-14 PROCEDURE — 3079F DIAST BP 80-89 MM HG: CPT | Mod: CPTII,S$GLB,, | Performed by: PHYSICIAN ASSISTANT

## 2022-04-14 PROCEDURE — 3044F PR MOST RECENT HEMOGLOBIN A1C LEVEL <7.0%: ICD-10-PCS | Mod: CPTII,S$GLB,, | Performed by: PHYSICIAN ASSISTANT

## 2022-04-14 PROCEDURE — 3044F HG A1C LEVEL LT 7.0%: CPT | Mod: CPTII,S$GLB,, | Performed by: PHYSICIAN ASSISTANT

## 2022-04-14 PROCEDURE — 3288F PR FALLS RISK ASSESSMENT DOCUMENTED: ICD-10-PCS | Mod: CPTII,S$GLB,, | Performed by: PHYSICIAN ASSISTANT

## 2022-04-14 PROCEDURE — 1126F AMNT PAIN NOTED NONE PRSNT: CPT | Mod: CPTII,S$GLB,, | Performed by: PHYSICIAN ASSISTANT

## 2022-04-14 PROCEDURE — 99024 POSTOP FOLLOW-UP VISIT: CPT | Mod: S$GLB,,, | Performed by: PHYSICIAN ASSISTANT

## 2022-04-14 PROCEDURE — 1101F PT FALLS ASSESS-DOCD LE1/YR: CPT | Mod: CPTII,S$GLB,, | Performed by: PHYSICIAN ASSISTANT

## 2022-04-14 PROCEDURE — 3008F BODY MASS INDEX DOCD: CPT | Mod: CPTII,S$GLB,, | Performed by: PHYSICIAN ASSISTANT

## 2022-04-14 PROCEDURE — 3077F PR MOST RECENT SYSTOLIC BLOOD PRESSURE >= 140 MM HG: ICD-10-PCS | Mod: CPTII,S$GLB,, | Performed by: PHYSICIAN ASSISTANT

## 2022-04-14 PROCEDURE — 3008F PR BODY MASS INDEX (BMI) DOCUMENTED: ICD-10-PCS | Mod: CPTII,S$GLB,, | Performed by: PHYSICIAN ASSISTANT

## 2022-04-14 PROCEDURE — 3079F PR MOST RECENT DIASTOLIC BLOOD PRESSURE 80-89 MM HG: ICD-10-PCS | Mod: CPTII,S$GLB,, | Performed by: PHYSICIAN ASSISTANT

## 2022-04-14 PROCEDURE — 99999 PR PBB SHADOW E&M-EST. PATIENT-LVL III: ICD-10-PCS | Mod: PBBFAC,,, | Performed by: PHYSICIAN ASSISTANT

## 2022-04-14 PROCEDURE — 1160F PR REVIEW ALL MEDS BY PRESCRIBER/CLIN PHARMACIST DOCUMENTED: ICD-10-PCS | Mod: CPTII,S$GLB,, | Performed by: PHYSICIAN ASSISTANT

## 2022-04-14 PROCEDURE — 1159F MED LIST DOCD IN RCRD: CPT | Mod: CPTII,S$GLB,, | Performed by: PHYSICIAN ASSISTANT

## 2022-04-14 PROCEDURE — 99999 PR PBB SHADOW E&M-EST. PATIENT-LVL III: CPT | Mod: PBBFAC,,, | Performed by: PHYSICIAN ASSISTANT

## 2022-04-14 PROCEDURE — 3077F SYST BP >= 140 MM HG: CPT | Mod: CPTII,S$GLB,, | Performed by: PHYSICIAN ASSISTANT

## 2022-04-14 RX ORDER — CELECOXIB 200 MG/1
200 CAPSULE ORAL DAILY
Qty: 30 CAPSULE | Refills: 0 | Status: SHIPPED | OUTPATIENT
Start: 2022-04-14 | End: 2022-05-14

## 2022-04-14 NOTE — PROGRESS NOTES
S:Celina Azevedo presents for post-operative evaluation.     DATE OF PROCEDURE: 4/1/2022      PROCEDURE PERFORMED:   Left knee arthroscopic partial lateral meniscectomy (CPT 86676)      Left knee arthroscopic chondroplasty   Left knee arthroscopic partial synovectomy    Celina Azevedo reports to be doing well 2wk s/p the above mentioned procedure. Denies fevers, chills, night sweats, chest pain, difficulty breathing, calf pain or tenderness. Going to PT [facility]2xWeek at the [facility]. Seeing good progress daily. Pain levels are improving. Has d/c'd pain medication.  States that she is working hard in physical therapy and trying to do exercise at home.  Overall, doing well.    O: The incisions are healing well.  No signs of infection.  Sutures were removed. No significant pain or unusual tenderness. ROM:  Patient is about 2-3° short of full extension actively, can reach full extension passively.  Flexion to 115°.  Mild swelling, seems to be more soft tissue edema rather effusion.    A/P:  3D knee Compression sleeve fitted today in clinic.  Celebrex 200 mg p.o. once daily as needed for swelling and pain.  Continue to work on physical therapy to help reach full extension.  Steri-Strips like, keep dry for 3 more days.  Plan to follow the rehab plan as previously outlined. RTC in 4 weeks with Dr. Fuentes for 6 week post op visit.     POSTOPERATIVE PLAN: Patient may weight bear as tolerates on crutches. Full range of motion to tolerance. Will start physical therapy right away. ASA 81 mg BID x 2 weeks for DVT prophylaxis.    Translation provided by Cary Tan MA in clinic today.

## 2022-04-18 ENCOUNTER — CLINICAL SUPPORT (OUTPATIENT)
Dept: REHABILITATION | Facility: HOSPITAL | Age: 67
End: 2022-04-18
Payer: MEDICARE

## 2022-04-18 DIAGNOSIS — M25.469 PAIN AND SWELLING OF KNEE, UNSPECIFIED LATERALITY: Primary | ICD-10-CM

## 2022-04-18 DIAGNOSIS — R26.2 DIFFICULTY WALKING: ICD-10-CM

## 2022-04-18 DIAGNOSIS — M25.569 PAIN AND SWELLING OF KNEE, UNSPECIFIED LATERALITY: Primary | ICD-10-CM

## 2022-04-18 PROCEDURE — 97116 GAIT TRAINING THERAPY: CPT

## 2022-04-18 PROCEDURE — 97530 THERAPEUTIC ACTIVITIES: CPT

## 2022-04-18 PROCEDURE — 97112 NEUROMUSCULAR REEDUCATION: CPT

## 2022-04-20 ENCOUNTER — CLINICAL SUPPORT (OUTPATIENT)
Dept: REHABILITATION | Facility: HOSPITAL | Age: 67
End: 2022-04-20
Payer: MEDICARE

## 2022-04-20 DIAGNOSIS — M25.562 LEFT KNEE PAIN, UNSPECIFIED CHRONICITY: Primary | ICD-10-CM

## 2022-04-20 PROBLEM — R26.2 DIFFICULTY WALKING: Status: ACTIVE | Noted: 2022-04-20

## 2022-04-20 PROBLEM — M25.469 PAIN AND SWELLING OF KNEE: Status: ACTIVE | Noted: 2021-03-10

## 2022-04-20 PROBLEM — M25.569 PAIN AND SWELLING OF KNEE: Status: ACTIVE | Noted: 2021-03-10

## 2022-04-20 NOTE — PROGRESS NOTES
Physical Therapy Daily Treatment Note     Name: Celina Azevedo  Clinic Number: [id number]    Therapy Diagnosis:   No diagnosis found.  Physician: Teddy Valentin PA*    Visit Date: 4/18/2022  Physician Orders: PT Eval and Treat  Medical Diagnosis from Referral:   M25.562 (ICD-10-CM) - Left knee pain, unspecified chronicity   M23.201 (ICD-10-CM) - Old tear of lateral meniscus of left knee, unspecified tear type         Evaluation Date: 4/4/2022  Authorization Period Expiration: 3/29/2023  Plan of Care Expiration: 12/31/2021  Visit #/Visits authorized: 2/ 20     Time In: 1200  Time Out: 1300  Total Billable Time: 58 minutes    Precautions: Standard    Subjective     Pt reports: has a lot of swelling but doesn't know why  She was compliant with home exercise program.  Response to previous treatment: none  Functional change: improved ROM    Pain: 0/10  Location: left knee      Objective   AROM:  Ext: 0 hyper  FL: 120  Gait: sig antalgic, dec TKE, early heel off, dec WB L LE     Celina received therapeutic exercises to develop strength, endurance, ROM and core stabilization for 15 minutes including:  Bike x 10 min lvl 4  Heel prop ext S #3 above/below knee x 5 min      Celina participated in neuromuscular re-education activities to improve: Balance, Coordination, Kinesthetic and Proprioception for 25 minutes. The following activities were included:  TKE with mirror feedback x10 min   Quad sets with cueing x10m  Heel slides x 5 min    Celina participated in dynamic functional therapeutic activities to improve functional performance for 10 minutes, including:      Celina participated in gait training to improve functional mobility and safety for 16 minutes, including:  Walking w/o crutch    received the following manual therapy techniques: Joint mobilizations and Soft tissue Mobilization were applied to the: L knee for 02 minutes, including:  Patella mobs  AP femoral mobs  Ext hinge mobs    Home Exercises  Provided and Patient Education Provided     Education provided:   - Cont HEP    Written Home Exercises Provided: Patient instructed to cont prior HEP.  Exercises were reviewed and Celina was able to demonstrate them prior to the end of the session.  Celina demonstrated good  understanding of the education provided.     See EMR under Patient Instructions for exercises provided prior visit.    Assessment   The pt arrived with sig antalgic gait and noticeable instability with walking without AD- advised on use of crutch to A with walking mechanics. The patient required significant cueing with fair carryover for all exercises. Cont to prog as rosario   Celina Is progressing well towards her goals.   Pt prognosis is Good.     Pt will continue to benefit from skilled outpatient physical therapy to address the deficits listed in the problem list box on initial evaluation, provide pt/family education and to maximize pt's level of independence in the home and community environment.     Pt's spiritual, cultural and educational needs considered and pt agreeable to plan of care and goals.    Anticipated barriers to physical therapy: Language Barrier    Goals: Short Term Goals: 8-10 weeks  1. Pt will be compliant with HEP 50% of prescribed amount.   2. The pt to demo improvement in L knee ROM to equal R knee ROM pain free   3.  The pt to demo good quad set with proper hyperextension moment of the L knee   4. The pt to demo ambualting with elast restricitve AD witout major compensatory pattern L knee for at least 20 feet      Long Term Goals: 30 weeks   1. Pt will be compliant with % of prescribed amount.   2. The pt to demo pain free and uncompensated walking mechanics x10 min on an indoor treadmill  3. The pt to demo tolerance to a sit to stand 10x without HHA with uncompensated mechanics x10   4. The pt will report full participation in ADLs and IADLs without restrictions related to L knee.       Plan     Cont w/ POC  focusing on qaud strength and ROM    Isabel Tineo, PT

## 2022-04-20 NOTE — PROGRESS NOTES
"Physical Therapy Daily Treatment Note     Name: Celina Azevedo  Clinic Number: [id number]    Therapy Diagnosis:   Encounter Diagnosis   Name Primary?    Left knee pain, unspecified chronicity Yes     Physician: Teddy Valentin PA*    Visit Date: 4/20/2022  Physician Orders: PT Eval and Treat  Medical Diagnosis from Referral:   M25.562 (ICD-10-CM) - Left knee pain, unspecified chronicity   M23.201 (ICD-10-CM) - Old tear of lateral meniscus of left knee, unspecified tear type         Evaluation Date: 4/4/2022  Authorization Period Expiration: 3/29/2023  Plan of Care Expiration: 12/31/2021  Visit #/Visits authorized: 4/ 20     Time In: 11:05  Time Out: 1200  Total Billable Time: 55 minutes    Precautions: Standard    Subjective     Pt reports: not much knee pain but do have swelling  She was compliant with home exercise program.  Response to previous treatment: none  Functional change: improved ROM    Pain: 0/10  Location: left knee      Objective   AROM:  Ext: 0 hyper  FL: 120    Celina received therapeutic exercises to develop strength, endurance, ROM and core stabilization for 27 minutes including:  Bike x 10 min lvl 5  Heel prop ext S #3 above/below knee x 5 min  QS x 5 min   SAQ x 5 min    Celina participated in neuromuscular re-education activities to improve: Balance, Coordination, Kinesthetic and Proprioception for 15 minutes. The following activities were included:  TKE w/ YTB x 5 min  SLR     Celina participated in dynamic functional therapeutic activities to improve functional performance for 08 minutes, including:  STS 20" 4x10    Celina participated in gait training to improve functional mobility and safety for 00 minutes, including:  Walking w/o crutch    received the following manual therapy techniques: Joint mobilizations and Soft tissue Mobilization were applied to the: L knee for 05 minutes, including:  Patella mobs  AP femoral mobs  Ext hinge mobs    Home Exercises Provided and Patient " Education Provided     Education provided:   - Cont HEP    Written Home Exercises Provided: Patient instructed to cont prior HEP.  Exercises were reviewed and Celina was able to demonstrate them prior to the end of the session.  Celina demonstrated good  understanding of the education provided.     See EMR under Patient Instructions for exercises provided prior visit.    Assessment   Improved understanding of quad activation. Has bouts of forgetting to activate quad during LLE WB when ambulating. Ed pt on why knee is swelling and the importance of getting her L quad stronger. Pt may be standing too long through out the day which is contributing to her knee swelling as well. Pt is to take more sit breaks to unload L knee as well as to perform quad sets.   Celina Is progressing well towards her goals.   Pt prognosis is Good.     Pt will continue to benefit from skilled outpatient physical therapy to address the deficits listed in the problem list box on initial evaluation, provide pt/family education and to maximize pt's level of independence in the home and community environment.     Pt's spiritual, cultural and educational needs considered and pt agreeable to plan of care and goals.    Anticipated barriers to physical therapy: Language Barrier    Goals: Short Term Goals: 8-10 weeks  1. Pt will be compliant with HEP 50% of prescribed amount.   2. The pt to demo improvement in L knee ROM to equal R knee ROM pain free   3.  The pt to demo good quad set with proper hyperextension moment of the L knee   4. The pt to demo ambualting with elast restricitve AD witout major compensatory pattern L knee for at least 20 feet      Long Term Goals: 30 weeks   1. Pt will be compliant with % of prescribed amount.   2. The pt to demo pain free and uncompensated walking mechanics x10 min on an indoor treadmill  3. The pt to demo tolerance to a sit to stand 10x without HHA with uncompensated mechanics x10   4. The pt will  report full participation in ADLs and IADLs without restrictions related to L knee.       Plan     Cont w/ POC focusing on qaud strength and ROM    Felipa Higgins, PTA

## 2022-04-25 ENCOUNTER — CLINICAL SUPPORT (OUTPATIENT)
Dept: REHABILITATION | Facility: HOSPITAL | Age: 67
End: 2022-04-25
Payer: MEDICARE

## 2022-04-25 DIAGNOSIS — R26.2 DIFFICULTY WALKING: Primary | ICD-10-CM

## 2022-04-25 DIAGNOSIS — M25.569 PAIN AND SWELLING OF KNEE, UNSPECIFIED LATERALITY: ICD-10-CM

## 2022-04-25 DIAGNOSIS — M25.469 PAIN AND SWELLING OF KNEE, UNSPECIFIED LATERALITY: ICD-10-CM

## 2022-04-25 PROCEDURE — 97140 MANUAL THERAPY 1/> REGIONS: CPT

## 2022-04-25 PROCEDURE — 97110 THERAPEUTIC EXERCISES: CPT

## 2022-04-25 PROCEDURE — 97112 NEUROMUSCULAR REEDUCATION: CPT

## 2022-04-27 ENCOUNTER — CLINICAL SUPPORT (OUTPATIENT)
Dept: REHABILITATION | Facility: HOSPITAL | Age: 67
End: 2022-04-27
Payer: MEDICARE

## 2022-04-27 DIAGNOSIS — M25.462 PAIN AND SWELLING OF LEFT KNEE: ICD-10-CM

## 2022-04-27 DIAGNOSIS — M25.562 PAIN AND SWELLING OF LEFT KNEE: ICD-10-CM

## 2022-04-27 DIAGNOSIS — R26.2 DIFFICULTY WALKING: Primary | ICD-10-CM

## 2022-04-27 PROCEDURE — 97140 MANUAL THERAPY 1/> REGIONS: CPT | Mod: CQ

## 2022-04-27 PROCEDURE — 97112 NEUROMUSCULAR REEDUCATION: CPT | Mod: CQ

## 2022-04-27 PROCEDURE — 97110 THERAPEUTIC EXERCISES: CPT | Mod: CQ

## 2022-04-27 NOTE — PROGRESS NOTES
S:Celina Azevedo presents for post-operative evaluation.     DATE OF PROCEDURE: 4/1/2022   PROCEDURE PERFORMED:   Left knee arthroscopic partial lateral meniscectomy (CPT 89534)      Left knee arthroscopic chondroplasty   Left knee arthroscopic partial synovectomy    Celina Azevedo reports to be doing well 6wk s/p the above mentioned procedure.  Doing well overall.  Has some mild residual soreness in the knee but getting better.    O:  Exam of the left knee shows some soft tissue swelling as expected.  No effusion.  Good quad activation and strength.  Range of motion actively is near symmetric to the other side.  No significant tenderness.    A/P:  Arthroscopic pictures reviewed.  The patient does have some underlying chondromalacia.  The significance of that was discussed.  We discussed a low-impact cardio exercise program.  Focus on quad strengthening and avoidance of certain exacerbating activities.  May continue to take anti-inflammatory medication as needed.  May return to work as symptoms allow.  Discussed the need for breaks as needed.  Return to clinic in 6 weeks.

## 2022-04-27 NOTE — PROGRESS NOTES
Physical Therapy Daily Treatment Note     Name: Celina Azevedo  Clinic Number: [id number]    Therapy Diagnosis:   Encounter Diagnoses   Name Primary?    Difficulty walking Yes    Pain and swelling of left knee      Physician: Teddy Valentin PA*    Visit Date: 4/27/2022  Physician Orders: PT Eval and Treat  Medical Diagnosis from Referral:   M25.562 (ICD-10-CM) - Left knee pain, unspecified chronicity   M23.201 (ICD-10-CM) - Old tear of lateral meniscus of left knee, unspecified tear type         Evaluation Date: 4/4/2022  Authorization Period Expiration: 3/29/2023  Plan of Care Expiration: 12/31/2021  Visit #/Visits authorized: 6/ 20     Time In: 1110  Time Out: 1202  Total Billable Time: 52 minutes    Precautions: Standard    Subjective     Pt reports: knee is still swollen  She was compliant with home exercise program.  Response to previous treatment: none  Functional change: improved ROM    Pain: 0/10  Location: left knee      Objective   AROM:  Pre-tx Ext: 3 hypo post-tx: 2 hyper  start FL: 120    Celina received therapeutic exercises to develop strength, endurance, ROM and core stabilization for 30 minutes including:  Bike x 10 min lvl 5  Heel prop ext S #5 above/below knee x 10 min  Lateral walks <-> 4x no band along line   Heel slides w/ QS x 3 min  STS w/ TKE 4x10    Celina participated in neuromuscular re-education activities to improve: Balance, Coordination, Kinesthetic and Proprioception for 12 minutes. The following activities were included:  Tandem walking <-> 3x  SAQ  With assistance of band 30x     Celina participated in dynamic functional therapeutic activities to improve functional performance for 00 minutes, including:      received the following manual therapy techniques: Joint mobilizations and Soft tissue Mobilization were applied to the: L knee for 08 minutes, including:  Patella mobs  AP femoral mobs  Ext hinge mobs    Home Exercises Provided and Patient Education Provided      Education provided:   - Cont HEP    Written Home Exercises Provided: Patient instructed to cont prior HEP.  Exercises were reviewed and Celina was able to demonstrate them prior to the end of the session.  Celina demonstrated good  understanding of the education provided.     See EMR under Patient Instructions for exercises provided prior visit.    Assessment   Pt cont to amb on bent knee. Starts session off w/ decreased AROM ext but able to gain back after LLLD ext stretch. Educated that it is very important to keep extension gained in therapy. Improved balance w/ tandem walking.     Celina Is progressing well towards her goals.   Pt prognosis is Good.     Pt will continue to benefit from skilled outpatient physical therapy to address the deficits listed in the problem list box on initial evaluation, provide pt/family education and to maximize pt's level of independence in the home and community environment.     Pt's spiritual, cultural and educational needs considered and pt agreeable to plan of care and goals.    Anticipated barriers to physical therapy: Language Barrier    Goals: Short Term Goals: 8-10 weeks  1. Pt will be compliant with HEP 50% of prescribed amount.   2. The pt to demo improvement in L knee ROM to equal R knee ROM pain free   3.  The pt to demo good quad set with proper hyperextension moment of the L knee   4. The pt to demo ambualting with elast restricitve AD witout major compensatory pattern L knee for at least 20 feet      Long Term Goals: 30 weeks   1. Pt will be compliant with % of prescribed amount.   2. The pt to demo pain free and uncompensated walking mechanics x10 min on an indoor treadmill  3. The pt to demo tolerance to a sit to stand 10x without HHA with uncompensated mechanics x10   4. The pt will report full participation in ADLs and IADLs without restrictions related to L knee.       Plan     Cont w/ POC focusing on qaud strength and ROM    Felipa Higgins  PTA

## 2022-04-27 NOTE — PROGRESS NOTES
Physical Therapy Daily Treatment Note     Name: Celina Azevedo  Clinic Number: [id number]    Therapy Diagnosis:   Encounter Diagnoses   Name Primary?    Difficulty walking Yes    Pain and swelling of knee, unspecified laterality      Physician: Teddy Valentin PA*    Visit Date: 4/25/2022  Physician Orders: PT Eval and Treat  Medical Diagnosis from Referral:   M25.562 (ICD-10-CM) - Left knee pain, unspecified chronicity   M23.201 (ICD-10-CM) - Old tear of lateral meniscus of left knee, unspecified tear type         Evaluation Date: 4/4/2022  Authorization Period Expiration: 3/29/2023  Plan of Care Expiration: 12/31/2021  Visit #/Visits authorized: 5/ 20     Time In: 1300  Time Out: 1400  Total Billable Time: 55 minutes    Precautions: Standard    Subjective     Pt reports: swelling in knee is better today, has been wearing compression sleeve  She was compliant with home exercise program.  Response to previous treatment: none  Functional change: improved ROM    Pain: 0/10  Location: left knee      Objective   AROM:  Ext: 0 hyper  FL: 120    Celina received therapeutic exercises to develop strength, endurance, ROM and core stabilization for 28 minutes including:  Bike x 10 min lvl 5  Heel prop ext S #5 above/below knee x 10 min  Lateral walks <-> 4x no band along line     Celina participated in neuromuscular re-education activities to improve: Balance, Coordination, Kinesthetic and Proprioception for 12 minutes. The following activities were included:  Tandem walking <-> 3x  SAQ  With assistance of band 30x     Celina participated in dynamic functional therapeutic activities to improve functional performance for 00 minutes, including:      received the following manual therapy techniques: Joint mobilizations and Soft tissue Mobilization were applied to the: L knee for 08 minutes, including:  Patella mobs  AP femoral mobs  Ext hinge mobs    Home Exercises Provided and Patient Education Provided     Education  provided:   - Cont HEP    Written Home Exercises Provided: Patient instructed to cont prior HEP.  Exercises were reviewed and Celina was able to demonstrate them prior to the end of the session.  Celina demonstrated good  understanding of the education provided.     See EMR under Patient Instructions for exercises provided prior visit.    Assessment   The patient with sig improvement in quad set with ability to complete without cueing. The patient with some lag during SAQ but responded well to band assistance and was able to complete the last 8 repetitions without A. The patient cont to demo slight antalgic gait that is improving.     Celina Is progressing well towards her goals.   Pt prognosis is Good.     Pt will continue to benefit from skilled outpatient physical therapy to address the deficits listed in the problem list box on initial evaluation, provide pt/family education and to maximize pt's level of independence in the home and community environment.     Pt's spiritual, cultural and educational needs considered and pt agreeable to plan of care and goals.    Anticipated barriers to physical therapy: Language Barrier    Goals: Short Term Goals: 8-10 weeks  1. Pt will be compliant with HEP 50% of prescribed amount.   2. The pt to demo improvement in L knee ROM to equal R knee ROM pain free   3.  The pt to demo good quad set with proper hyperextension moment of the L knee   4. The pt to demo ambualting with elast restricitve AD witout major compensatory pattern L knee for at least 20 feet      Long Term Goals: 30 weeks   1. Pt will be compliant with % of prescribed amount.   2. The pt to demo pain free and uncompensated walking mechanics x10 min on an indoor treadmill  3. The pt to demo tolerance to a sit to stand 10x without HHA with uncompensated mechanics x10   4. The pt will report full participation in ADLs and IADLs without restrictions related to L knee.       Plan     Cont w/ POC focusing  on qaud strength and ROM    Isabel Tineo, PT

## 2022-05-02 ENCOUNTER — CLINICAL SUPPORT (OUTPATIENT)
Dept: REHABILITATION | Facility: HOSPITAL | Age: 67
End: 2022-05-02
Payer: MEDICARE

## 2022-05-02 DIAGNOSIS — M25.462 PAIN AND SWELLING OF LEFT KNEE: ICD-10-CM

## 2022-05-02 DIAGNOSIS — M25.562 PAIN AND SWELLING OF LEFT KNEE: ICD-10-CM

## 2022-05-02 DIAGNOSIS — R26.2 DIFFICULTY WALKING: Primary | ICD-10-CM

## 2022-05-02 PROCEDURE — 97140 MANUAL THERAPY 1/> REGIONS: CPT | Mod: CQ

## 2022-05-02 PROCEDURE — 97110 THERAPEUTIC EXERCISES: CPT | Mod: CQ

## 2022-05-02 NOTE — PROGRESS NOTES
"Physical Therapy Daily Treatment Note     Name: Celina Azevedo  Clinic Number: [id number]    Therapy Diagnosis:   Encounter Diagnoses   Name Primary?    Difficulty walking Yes    Pain and swelling of left knee      Physician: Teddy Valentin PA*    Visit Date: 5/2/2022  Physician Orders: PT Eval and Treat  Medical Diagnosis from Referral:   M25.562 (ICD-10-CM) - Left knee pain, unspecified chronicity   M23.201 (ICD-10-CM) - Old tear of lateral meniscus of left knee, unspecified tear type         Evaluation Date: 4/4/2022  Authorization Period Expiration: 3/29/2023  Plan of Care Expiration: 12/31/2021  Visit #/Visits authorized: 7/ 20     Time In: 2:10 p  Time Out: 3:00 p  Total Billable Time: 23 minutes    Precautions: Standard    Subjective     Pt reports: Knee has been hurting  She was compliant with home exercise program.  Response to previous treatment: none  Functional change: improved ROM    Pain: 0/10  Location: left knee      Objective   AROM:  Pre-tx Ext: 10 hypo post LLLD S: 0  start FL: 120-NT    Celina received therapeutic exercises to develop strength, endurance, ROM and core stabilization for 42 minutes including:  Bike x 10 min lvl 5  Heel prop ext S #5 above/below knee x 5 min  Lateral walks <-> 4x no band along line -NP  QS x 5 min 5" hold  Heel slides w/ QS w/ pull into hyper ext x 5 min  STS w/ TKE 4x10 w/ and w/o band around knees  TKE w/ GTB 5" hold x 5 min    Celina participated in neuromuscular re-education activities to improve: Balance, Coordination, Kinesthetic and Proprioception for 00 minutes. The following activities were included:  Tandem walking <-> 3x  SAQ  X 3 min 5" hold    Celina participated in dynamic functional therapeutic activities to improve functional performance for 00 minutes, including:      received the following manual therapy techniques: Joint mobilizations and Soft tissue Mobilization were applied to the: L knee for 08 minutes, including:  Patella mobs  AP " femoral mobs  Ext hinge mobs    Home Exercises Provided and Patient Education Provided     Education provided:   - Cont HEP    Written Home Exercises Provided: Patient instructed to cont prior HEP.  Exercises were reviewed and Celina was able to demonstrate them prior to the end of the session.  Celina demonstrated good  understanding of the education provided.     See EMR under Patient Instructions for exercises provided prior visit.    Assessment   Amb into session w/ decreased TKE as well as noted increased pain. Decreased AROM hyperext but was able to gain back full ext after heel prop. Pt verbalized that she understands that she needs to decrease the prolong frequency that she is standing as well as increased the amount of times per day she performs heel prop stretch and Quad sets. Heavy VC needed w/ STS to prevent R lat wt shift and well prevent femoral IR. Better mechanics w/ STS w/ TC of band around knees. Improved TKE w/ amb at end of session.    Celina Is progressing well towards her goals.   Pt prognosis is Good.     Pt will continue to benefit from skilled outpatient physical therapy to address the deficits listed in the problem list box on initial evaluation, provide pt/family education and to maximize pt's level of independence in the home and community environment.     Pt's spiritual, cultural and educational needs considered and pt agreeable to plan of care and goals.    Anticipated barriers to physical therapy: Language Barrier    Goals: Short Term Goals: 8-10 weeks  1. Pt will be compliant with HEP 50% of prescribed amount.   2. The pt to demo improvement in L knee ROM to equal R knee ROM pain free   3.  The pt to demo good quad set with proper hyperextension moment of the L knee   4. The pt to demo ambualting with elast restricitve AD witout major compensatory pattern L knee for at least 20 feet      Long Term Goals: 30 weeks   1. Pt will be compliant with % of prescribed  amount.   2. The pt to demo pain free and uncompensated walking mechanics x10 min on an indoor treadmill  3. The pt to demo tolerance to a sit to stand 10x without HHA with uncompensated mechanics x10   4. The pt will report full participation in ADLs and IADLs without restrictions related to L knee.       Plan     Cont w/ POC focusing on qaud strength and ROM    Felipa Higgins, PTA

## 2022-05-05 ENCOUNTER — OFFICE VISIT (OUTPATIENT)
Dept: SPORTS MEDICINE | Facility: CLINIC | Age: 67
End: 2022-05-05
Payer: MEDICARE

## 2022-05-05 ENCOUNTER — CLINICAL SUPPORT (OUTPATIENT)
Dept: REHABILITATION | Facility: HOSPITAL | Age: 67
End: 2022-05-05
Payer: MEDICARE

## 2022-05-05 VITALS
OXYGEN SATURATION: 95 % | HEART RATE: 72 BPM | DIASTOLIC BLOOD PRESSURE: 88 MMHG | BODY MASS INDEX: 30.73 KG/M2 | WEIGHT: 168 LBS | SYSTOLIC BLOOD PRESSURE: 133 MMHG

## 2022-05-05 DIAGNOSIS — R26.2 DIFFICULTY WALKING: Primary | ICD-10-CM

## 2022-05-05 DIAGNOSIS — M25.462 PAIN AND SWELLING OF LEFT KNEE: ICD-10-CM

## 2022-05-05 DIAGNOSIS — M25.562 LEFT KNEE PAIN, UNSPECIFIED CHRONICITY: Primary | ICD-10-CM

## 2022-05-05 DIAGNOSIS — I83.813 VARICOSE VEINS OF LEG WITH PAIN, BILATERAL: ICD-10-CM

## 2022-05-05 DIAGNOSIS — M25.562 PAIN AND SWELLING OF LEFT KNEE: ICD-10-CM

## 2022-05-05 PROCEDURE — 1159F MED LIST DOCD IN RCRD: CPT | Mod: CPTII,S$GLB,, | Performed by: ORTHOPAEDIC SURGERY

## 2022-05-05 PROCEDURE — 97110 THERAPEUTIC EXERCISES: CPT | Mod: CQ

## 2022-05-05 PROCEDURE — 1125F PR PAIN SEVERITY QUANTIFIED, PAIN PRESENT: ICD-10-PCS | Mod: CPTII,S$GLB,, | Performed by: ORTHOPAEDIC SURGERY

## 2022-05-05 PROCEDURE — 99999 PR PBB SHADOW E&M-EST. PATIENT-LVL III: ICD-10-PCS | Mod: PBBFAC,,, | Performed by: ORTHOPAEDIC SURGERY

## 2022-05-05 PROCEDURE — 99999 PR PBB SHADOW E&M-EST. PATIENT-LVL III: CPT | Mod: PBBFAC,,, | Performed by: ORTHOPAEDIC SURGERY

## 2022-05-05 PROCEDURE — 3008F PR BODY MASS INDEX (BMI) DOCUMENTED: ICD-10-PCS | Mod: CPTII,S$GLB,, | Performed by: ORTHOPAEDIC SURGERY

## 2022-05-05 PROCEDURE — 3075F SYST BP GE 130 - 139MM HG: CPT | Mod: CPTII,S$GLB,, | Performed by: ORTHOPAEDIC SURGERY

## 2022-05-05 PROCEDURE — 3079F DIAST BP 80-89 MM HG: CPT | Mod: CPTII,S$GLB,, | Performed by: ORTHOPAEDIC SURGERY

## 2022-05-05 PROCEDURE — 99024 PR POST-OP FOLLOW-UP VISIT: ICD-10-PCS | Mod: S$GLB,,, | Performed by: ORTHOPAEDIC SURGERY

## 2022-05-05 PROCEDURE — 3079F PR MOST RECENT DIASTOLIC BLOOD PRESSURE 80-89 MM HG: ICD-10-PCS | Mod: CPTII,S$GLB,, | Performed by: ORTHOPAEDIC SURGERY

## 2022-05-05 PROCEDURE — 1159F PR MEDICATION LIST DOCUMENTED IN MEDICAL RECORD: ICD-10-PCS | Mod: CPTII,S$GLB,, | Performed by: ORTHOPAEDIC SURGERY

## 2022-05-05 PROCEDURE — 3288F PR FALLS RISK ASSESSMENT DOCUMENTED: ICD-10-PCS | Mod: CPTII,S$GLB,, | Performed by: ORTHOPAEDIC SURGERY

## 2022-05-05 PROCEDURE — 3044F HG A1C LEVEL LT 7.0%: CPT | Mod: CPTII,S$GLB,, | Performed by: ORTHOPAEDIC SURGERY

## 2022-05-05 PROCEDURE — 3008F BODY MASS INDEX DOCD: CPT | Mod: CPTII,S$GLB,, | Performed by: ORTHOPAEDIC SURGERY

## 2022-05-05 PROCEDURE — 1101F PT FALLS ASSESS-DOCD LE1/YR: CPT | Mod: CPTII,S$GLB,, | Performed by: ORTHOPAEDIC SURGERY

## 2022-05-05 PROCEDURE — 3044F PR MOST RECENT HEMOGLOBIN A1C LEVEL <7.0%: ICD-10-PCS | Mod: CPTII,S$GLB,, | Performed by: ORTHOPAEDIC SURGERY

## 2022-05-05 PROCEDURE — 99024 POSTOP FOLLOW-UP VISIT: CPT | Mod: S$GLB,,, | Performed by: ORTHOPAEDIC SURGERY

## 2022-05-05 PROCEDURE — 3288F FALL RISK ASSESSMENT DOCD: CPT | Mod: CPTII,S$GLB,, | Performed by: ORTHOPAEDIC SURGERY

## 2022-05-05 PROCEDURE — 1101F PR PT FALLS ASSESS DOC 0-1 FALLS W/OUT INJ PAST YR: ICD-10-PCS | Mod: CPTII,S$GLB,, | Performed by: ORTHOPAEDIC SURGERY

## 2022-05-05 PROCEDURE — 97140 MANUAL THERAPY 1/> REGIONS: CPT | Mod: CQ

## 2022-05-05 PROCEDURE — 3075F PR MOST RECENT SYSTOLIC BLOOD PRESS GE 130-139MM HG: ICD-10-PCS | Mod: CPTII,S$GLB,, | Performed by: ORTHOPAEDIC SURGERY

## 2022-05-05 PROCEDURE — 1125F AMNT PAIN NOTED PAIN PRSNT: CPT | Mod: CPTII,S$GLB,, | Performed by: ORTHOPAEDIC SURGERY

## 2022-05-05 NOTE — PROGRESS NOTES
"Physical Therapy Daily Treatment Note     Name: Celina Azevedo  Clinic Number: [id number]    Therapy Diagnosis:   Encounter Diagnoses   Name Primary?    Difficulty walking Yes    Pain and swelling of left knee      Physician: Teddy Valentin PA*    Visit Date: 5/5/2022  Physician Orders: PT Eval and Treat  Medical Diagnosis from Referral:   M25.562 (ICD-10-CM) - Left knee pain, unspecified chronicity   M23.201 (ICD-10-CM) - Old tear of lateral meniscus of left knee, unspecified tear type         Evaluation Date: 4/4/2022  Authorization Period Expiration: 3/29/2023  Plan of Care Expiration: 12/31/2021  Visit #/Visits authorized: 8/ 20     Time In: 11:20 a  Time Out: 1205 p  Total Billable Time: 30 minutes    Precautions: Standard    Subjective     Pt reports: Knee hurts from doctor flexing knee at appt prior to therapy  She was compliant with home exercise program.  Response to previous treatment: none  Functional change: improved ROM    Pain: 0/10  Location: left knee      Objective   AROM:  Pre-tx Ext: 3 hypo post LLLD S: 0   FL: 120    Celina received therapeutic exercises to develop strength, endurance, ROM and core stabilization for 37 minutes including:  Bike x 10 min lvl 5  Heel prop ext S #5 above/below knee x 5 min  SLR 30x 3" hold  SAQ 5" hold 30x  Heel slides w/ QS w/ pull into hyper ext x 5 min      Celina participated in neuromuscular re-education activities to improve: Balance, Coordination, Kinesthetic and Proprioception for 00 minutes. The following activities were included:  Tandem walking <-> 3x  SAQ  X 3 min 5" hold    Celina participated in dynamic functional therapeutic activities to improve functional performance for 00 minutes, including:      received the following manual therapy techniques: Joint mobilizations and Soft tissue Mobilization were applied to the: L knee for 08 minutes, including:  AP femoral mobs  Ext hinge mobs    Home Exercises Provided and Patient Education Provided "     Education provided:   - Cont HEP    Written Home Exercises Provided: Patient instructed to cont prior HEP.  Exercises were reviewed and Celina was able to demonstrate them prior to the end of the session.  Celina demonstrated good  understanding of the education provided.     See EMR under Patient Instructions for exercises provided prior visit.    Assessment   Session shortened 2* pt 20 min late for appt. AROM knee ext has improved but still cont to have poor carry over from previous sessions achieving full knee ext despite pt stating she is performing knee ext stretch 3x/per day. Decreased knee FL at start of session but able to restore 120 degrees after heel slides. Focused on ROM and quad strength/activation today.    Celina Is progressing well towards her goals.   Pt prognosis is Good.     Pt will continue to benefit from skilled outpatient physical therapy to address the deficits listed in the problem list box on initial evaluation, provide pt/family education and to maximize pt's level of independence in the home and community environment.     Pt's spiritual, cultural and educational needs considered and pt agreeable to plan of care and goals.    Anticipated barriers to physical therapy: Language Barrier    Goals: Short Term Goals: 8-10 weeks  1. Pt will be compliant with HEP 50% of prescribed amount.   2. The pt to demo improvement in L knee ROM to equal R knee ROM pain free   3.  The pt to demo good quad set with proper hyperextension moment of the L knee   4. The pt to demo ambualting with elast restricitve AD witout major compensatory pattern L knee for at least 20 feet      Long Term Goals: 30 weeks   1. Pt will be compliant with % of prescribed amount.   2. The pt to demo pain free and uncompensated walking mechanics x10 min on an indoor treadmill  3. The pt to demo tolerance to a sit to stand 10x without HHA with uncompensated mechanics x10   4. The pt will report full participation in  ADLs and IADLs without restrictions related to L knee.       Plan     Cont w/ POC focusing on qaud strength and ROM    Felipa Higgins, PTA

## 2022-05-09 ENCOUNTER — CLINICAL SUPPORT (OUTPATIENT)
Dept: REHABILITATION | Facility: HOSPITAL | Age: 67
End: 2022-05-09
Payer: MEDICARE

## 2022-05-09 DIAGNOSIS — M25.469 PAIN AND SWELLING OF KNEE, UNSPECIFIED LATERALITY: ICD-10-CM

## 2022-05-09 DIAGNOSIS — M25.569 PAIN AND SWELLING OF KNEE, UNSPECIFIED LATERALITY: ICD-10-CM

## 2022-05-09 DIAGNOSIS — R26.2 DIFFICULTY WALKING: Primary | ICD-10-CM

## 2022-05-09 PROCEDURE — 97112 NEUROMUSCULAR REEDUCATION: CPT | Mod: CQ

## 2022-05-09 PROCEDURE — 97110 THERAPEUTIC EXERCISES: CPT | Mod: CQ

## 2022-05-09 PROCEDURE — 97140 MANUAL THERAPY 1/> REGIONS: CPT | Mod: CQ

## 2022-05-09 NOTE — PROGRESS NOTES
"Physical Therapy Daily Treatment Note     Name: Celina Azevedo  Clinic Number: [id number]    Therapy Diagnosis:   Encounter Diagnoses   Name Primary?    Difficulty walking Yes    Pain and swelling of knee, unspecified laterality      Physician: Teddy Valentin PA*    Visit Date: 5/9/2022  Physician Orders: PT Eval and Treat  Medical Diagnosis from Referral:   M25.562 (ICD-10-CM) - Left knee pain, unspecified chronicity   M23.201 (ICD-10-CM) - Old tear of lateral meniscus of left knee, unspecified tear type         Evaluation Date: 4/4/2022  Authorization Period Expiration: 3/29/2023  Plan of Care Expiration: 12/31/2021  Visit #/Visits authorized: 9/ 20     Time In: 1200  Time Out: 1300  Total Billable Time: 30 minutes    Precautions: Standard    Subjective     Pt reports: little knee pain today. No swelling  She was compliant with home exercise program.  Response to previous treatment: none  Functional change: improved ROM    Pain: 0/10  Location: left knee      Objective   AROM:  Pre-tx Ext: 3 hypo post LLLD S: 0   FL: 120    Celina received therapeutic exercises to develop strength, endurance, ROM and core stabilization for 37 minutes including:  Bike x 10 min lvl 5  Heel prop ext S #5 above/below knee x 5 min  SLR x 3 min 3" hold  Heel slides w/ QS w/ pull into hyper ext x 5 min  Shuttle DL 87.5 SL #50 4x10    Celina participated in neuromuscular re-education activities to improve: Balance, Coordination, Kinesthetic and Proprioception for 15 minutes. The following activities were included:  Tandem walking <-> 3x  SAQ  X 3 min 5" hold    Celina participated in dynamic functional therapeutic activities to improve functional performance for 00 minutes, including:      received the following manual therapy techniques: Joint mobilizations and Soft tissue Mobilization were applied to the: L knee for 08 minutes, including:  AP femoral mobs  Ext hinge mobs    Home Exercises Provided and Patient Education " Provided     Education provided:   - Cont HEP    Written Home Exercises Provided: Patient instructed to cont prior HEP.  Exercises were reviewed and Celina was able to demonstrate them prior to the end of the session.  Celina demonstrated good  understanding of the education provided.     See EMR under Patient Instructions for exercises provided prior visit.    Assessment   Swelling has improved since previous session. Cont to walk w/o TKE which improves before departure. Non lag w/ SLR. 10 degree increase w/ knee FL. Cont to focus on gait and quad strength.     Celina Is progressing well towards her goals.   Pt prognosis is Good.     Pt will continue to benefit from skilled outpatient physical therapy to address the deficits listed in the problem list box on initial evaluation, provide pt/family education and to maximize pt's level of independence in the home and community environment.     Pt's spiritual, cultural and educational needs considered and pt agreeable to plan of care and goals.    Anticipated barriers to physical therapy: Language Barrier    Goals: Short Term Goals: 8-10 weeks  1. Pt will be compliant with HEP 50% of prescribed amount.   2. The pt to demo improvement in L knee ROM to equal R knee ROM pain free   3.  The pt to demo good quad set with proper hyperextension moment of the L knee   4. The pt to demo ambualting with elast restricitve AD witout major compensatory pattern L knee for at least 20 feet      Long Term Goals: 30 weeks   1. Pt will be compliant with % of prescribed amount.   2. The pt to demo pain free and uncompensated walking mechanics x10 min on an indoor treadmill  3. The pt to demo tolerance to a sit to stand 10x without HHA with uncompensated mechanics x10   4. The pt will report full participation in ADLs and IADLs without restrictions related to L knee.       Plan     Cont w/ POC focusing on qaud strength and ROM    Felipa Higgins, PTA

## 2022-05-11 ENCOUNTER — CLINICAL SUPPORT (OUTPATIENT)
Dept: REHABILITATION | Facility: HOSPITAL | Age: 67
End: 2022-05-11
Payer: MEDICARE

## 2022-05-11 DIAGNOSIS — M25.462 PAIN AND SWELLING OF LEFT KNEE: ICD-10-CM

## 2022-05-11 DIAGNOSIS — R26.2 DIFFICULTY WALKING: Primary | ICD-10-CM

## 2022-05-11 DIAGNOSIS — M25.562 PAIN AND SWELLING OF LEFT KNEE: ICD-10-CM

## 2022-05-11 PROCEDURE — 97110 THERAPEUTIC EXERCISES: CPT | Mod: CQ

## 2022-05-11 NOTE — PROGRESS NOTES
"Physical Therapy Daily Treatment Note     Name: Celina Azevedo  Clinic Number: [id number]    Therapy Diagnosis:   Encounter Diagnoses   Name Primary?    Difficulty walking Yes    Pain and swelling of left knee      Physician: Teddy Valentin PA*    Visit Date: 5/11/2022  Physician Orders: PT Eval and Treat  Medical Diagnosis from Referral:   M25.562 (ICD-10-CM) - Left knee pain, unspecified chronicity   M23.201 (ICD-10-CM) - Old tear of lateral meniscus of left knee, unspecified tear type         Evaluation Date: 4/4/2022  Authorization Period Expiration: 3/29/2023  Plan of Care Expiration: 12/31/2021  Visit #/Visits authorized: 10/ 20     Time In: 1300  Time Out: 1400  Total Billable Time: 60 minutes    Precautions: Standard    Subjective     Pt reports: little knee pain today. No swelling  She was compliant with home exercise program.  Response to previous treatment: none  Functional change: improved ROM    Pain: 0/10  Location: left knee      Objective   Pre-tx  Knee ext:  At rest: 10 hypo  AROM: 5 hypo   PROM:0    AROM Knee ext after Heel prop: 2 hyper    Celina received therapeutic exercises to develop strength, endurance, ROM and core stabilization for 55 minutes including:  Bike x 10 min lvl 5  Heel prop ext S #7 above/below knee x 5 min  SLR x 3 min  #1  Bridges 3x10  Heel slides w/ QS w/ pull into hyper ext x 5 min  Shuttle DL #75 w/ YTB SL #50 4x10  SAQ  X 3 min 5" hold #2    Celina participated in neuromuscular re-education activities to improve: Balance, Coordination, Kinesthetic and Proprioception for 00minutes. The following activities were included:      Celina participated in dynamic functional therapeutic activities to improve functional performance for 00 minutes, including:      received the following manual therapy techniques: Joint mobilizations and Soft tissue Mobilization were applied to the: L knee for 05 minutes, including:  AP femoral mobs  Ext hinge mobs    Home Exercises " Provided and Patient Education Provided     Education provided:   - Cont HEP    Written Home Exercises Provided: Patient instructed to cont prior HEP.  Exercises were reviewed and Celina was able to demonstrate them prior to the end of the session.  Celina demonstrated good  understanding of the education provided.     See EMR under Patient Instructions for exercises provided prior visit.    Assessment   Increased swelling superior lateral patella today w/ noted increased knee pain. Session modified some to avoid FWB ex 2* increase in symptoms. Cont to amb into session w/ decreased TKE. Added bridges in to work on glut strength. TB used around knees for shuttle for TC to prevent femoral IR. Decreased knee pain after session.   Celina Is progressing well towards her goals.   Pt prognosis is Good.     Pt will continue to benefit from skilled outpatient physical therapy to address the deficits listed in the problem list box on initial evaluation, provide pt/family education and to maximize pt's level of independence in the home and community environment.     Pt's spiritual, cultural and educational needs considered and pt agreeable to plan of care and goals.    Anticipated barriers to physical therapy: Language Barrier    Goals: Short Term Goals: 8-10 weeks  1. Pt will be compliant with HEP 50% of prescribed amount.   2. The pt to demo improvement in L knee ROM to equal R knee ROM pain free   3.  The pt to demo good quad set with proper hyperextension moment of the L knee   4. The pt to demo ambualting with elast restricitve AD witout major compensatory pattern L knee for at least 20 feet      Long Term Goals: 30 weeks   1. Pt will be compliant with % of prescribed amount.   2. The pt to demo pain free and uncompensated walking mechanics x10 min on an indoor treadmill  3. The pt to demo tolerance to a sit to stand 10x without HHA with uncompensated mechanics x10   4. The pt will report full participation  in ADLs and IADLs without restrictions related to L knee.       Plan     Cont w/ POC focusing on qaud strength and ROM    Felipa Higgins, PTA

## 2022-05-16 ENCOUNTER — CLINICAL SUPPORT (OUTPATIENT)
Dept: REHABILITATION | Facility: HOSPITAL | Age: 67
End: 2022-05-16
Payer: MEDICARE

## 2022-05-16 DIAGNOSIS — M25.569 PAIN AND SWELLING OF KNEE, UNSPECIFIED LATERALITY: ICD-10-CM

## 2022-05-16 DIAGNOSIS — M25.469 PAIN AND SWELLING OF KNEE, UNSPECIFIED LATERALITY: ICD-10-CM

## 2022-05-16 DIAGNOSIS — R26.2 DIFFICULTY WALKING: Primary | ICD-10-CM

## 2022-05-16 PROCEDURE — 97112 NEUROMUSCULAR REEDUCATION: CPT

## 2022-05-16 PROCEDURE — 97110 THERAPEUTIC EXERCISES: CPT

## 2022-05-18 NOTE — PROGRESS NOTES
"Physical Therapy Daily Treatment Note     Name: Celina [place]  Clinic Number: [id number]    Therapy Diagnosis:   Encounter Diagnoses   Name Primary?    Difficulty walking Yes    Pain and swelling of knee, unspecified laterality      Physician: Teddy Valentin PA*    Visit Date: 5/16/2022  Physician Orders: PT Eval and Treat  Medical Diagnosis from Referral:   M25.562 (ICD-10-CM) - Left knee pain, unspecified chronicity   M23.201 (ICD-10-CM) - Old tear of lateral meniscus of left knee, unspecified tear type         Evaluation Date: 4/4/2022  Authorization Period Expiration: 3/29/2023  Plan of Care Expiration: 12/31/2021  Visit #/Visits authorized: 11/ 20     Time In: 1200  Time Out: 1300  Total Billable Time: 30 minutes    Precautions: Standard    Subjective     Pt reports: she has a lot of pain and swelling today. Reports she has not been doing her exercises.   She was not compliant with home exercise program.  Response to previous treatment: na  Functional change: improved ROM    Pain: 0/10  Location: left knee      Objective   Pre-tx  Knee ext:  At rest: 10 hypo  AROM: 5 hypo   PROM:0  AROM Knee ext after Heel prop: 5 hyper    Gait: Sig antalgic with forefoot initial contact, sig knee flexion at stance phase and poor terminal stance.     Swelling: moderate.       Daily Note     Celina received therapeutic exercises to develop strength, endurance, ROM and core stabilization for 35 minutes including:  Bike x 10 min lvl 5  Heel prop ext S #7 above/below knee x 5 min  Shuttle DL #75 w/ YTB SL #50 4x10  SAQ  X 3 min 5" hold #2  Education x10m     Celina participated in neuromuscular re-education activities to improve: Balance, Coordination, Kinesthetic and Proprioception for 15minutes. The following activities were included:  Quad Sets 10x10s with heel prop   LAQ 4x10   Gait heel toe training x5m     received the following manual therapy techniques: Joint mobilizations and Soft tissue Mobilization were " applied to the: L knee for 05 minutes, including:  AP femoral mobs  Ext hinge mobs    Home Exercises Provided and Patient Education Provided     Education provided:   - Cont HEP    Written Home Exercises Provided: Patient instructed to cont prior HEP.  Exercises were reviewed and Celina was able to demonstrate them prior to the end of the session.  Celina demonstrated good  understanding of the education provided.     See EMR under Patient Instructions for exercises provided prior visit.    Assessment   The patient cont to present with PT with deficit in knee extension in WB and NWB position and though exercise, work to improve the patient's extension, swelling and L knee pain. The patient demonstrates non compliance with HEP and difficulty understanding motor control and need to complete HEP multiple times a day to improve status. The patient also educated on proper gait mechanics though fair to poor carryover noted session to session. Will follow-up with MD regarding non compliance and cont knee ROM restrictions at the start of the session to leave with good ROM and fair gait mechanics only to arrive the next session with a decline in ROM and gait again.     Celina Is progressing well towards her goals.   Pt prognosis is Good.     Pt will continue to benefit from skilled outpatient physical therapy to address the deficits listed in the problem list box on initial evaluation, provide pt/family education and to maximize pt's level of independence in the home and community environment.     Pt's spiritual, cultural and educational needs considered and pt agreeable to plan of care and goals.    Anticipated barriers to physical therapy: Language Barrier    Goals: Short Term Goals: 8-10 weeks  1. Pt will be compliant with HEP 50% of prescribed amount.   2. The pt to demo improvement in L knee ROM to equal R knee ROM pain free   3.  The pt to demo good quad set with proper hyperextension moment of the L knee   4.  The pt to demo ambualting with elast restricitve AD witout major compensatory pattern L knee for at least 20 feet      Long Term Goals: 30 weeks   1. Pt will be compliant with % of prescribed amount.   2. The pt to demo pain free and uncompensated walking mechanics x10 min on an indoor treadmill  3. The pt to demo tolerance to a sit to stand 10x without HHA with uncompensated mechanics x10   4. The pt will report full participation in ADLs and IADLs without restrictions related to L knee.       Plan     Cont w/ POC focusing on qaud strength and ROM- cont to educate pt to complete HEP and focus on extension     Isabel Tineo, PT

## 2022-05-19 ENCOUNTER — CLINICAL SUPPORT (OUTPATIENT)
Dept: REHABILITATION | Facility: HOSPITAL | Age: 67
End: 2022-05-19
Payer: MEDICARE

## 2022-05-19 DIAGNOSIS — M25.462 PAIN AND SWELLING OF LEFT KNEE: ICD-10-CM

## 2022-05-19 DIAGNOSIS — R26.2 DIFFICULTY WALKING: Primary | ICD-10-CM

## 2022-05-19 DIAGNOSIS — M25.562 PAIN AND SWELLING OF LEFT KNEE: ICD-10-CM

## 2022-05-19 DIAGNOSIS — I83.813 VARICOSE VEINS OF LEG WITH PAIN, BILATERAL: ICD-10-CM

## 2022-05-19 PROCEDURE — 97112 NEUROMUSCULAR REEDUCATION: CPT | Mod: CQ

## 2022-05-19 PROCEDURE — 97110 THERAPEUTIC EXERCISES: CPT | Mod: CQ

## 2022-05-19 NOTE — PROGRESS NOTES
"Physical Therapy Daily Treatment Note     Name: Celina Azevedo  Clinic Number: [id number]    Therapy Diagnosis:   Encounter Diagnoses   Name Primary?    Varicose veins of leg with pain, bilateral     Difficulty walking Yes    Pain and swelling of left knee      Physician: Teddy Valentin PA*    Visit Date: 5/19/2022  Physician Orders: PT Eval and Treat  Medical Diagnosis from Referral:   M25.562 (ICD-10-CM) - Left knee pain, unspecified chronicity   M23.201 (ICD-10-CM) - Old tear of lateral meniscus of left knee, unspecified tear type         Evaluation Date: 4/4/2022  Authorization Period Expiration: 3/29/2023  Plan of Care Expiration: 12/31/2021  Visit #/Visits authorized: 12/ 20     Time In: 1010  Time Out: 1100  Total Billable Time: 50 minutes    Precautions: Standard    Subjective     Pt reports: Knee is feeling much better. Performing HEP a little more  She was not compliant with home exercise program.  Response to previous treatment: na  Functional change: improved ROM    Pain: 0/10  Location: left knee      Objective   Pre-tx  Knee ext:  AROM pre-tx: 2 hypo   AROM Knee ext after Heel prop: 0 hyper    Gait: Sig antalgic with forefoot initial contact, sig knee flexion at stance phase and poor terminal stance.     Swelling: moderate.       Daily Note     Celina received therapeutic exercises to develop strength, endurance, ROM and core stabilization for 35 minutes including:  Bike x 10 min lvl 5  Heel prop ext S #7 above/below knee x 5 min  Shuttle DL #75 w/ YTB SL #50 4x10  SAQ  X 3 min 5" hold #4      Celina participated in neuromuscular re-education activities to improve: Balance, Coordination, Kinesthetic and Proprioception for 10minutes. The following activities were included:  Quad Sets 10x10s with heel prop   LAQ 4x10     received the following manual therapy techniques: Joint mobilizations and Soft tissue Mobilization were applied to the: L knee for 05 minutes, including:  AP femoral mobs  Ext " "hinge mobs    Home Exercises Provided and Patient Education Provided     Education provided:   - Cont HEP    Written Home Exercises Provided: Patient instructed to cont prior HEP.  Exercises were reviewed and Celina was able to demonstrate them prior to the end of the session.  Celina demonstrated good  understanding of the education provided.     See EMR under Patient Instructions for exercises provided prior visit.    Assessment   Improvement in carry over of AROM knee ext. Decreased quad endurance w/ QS. Unable to hold 10" w/ good strong contraction for whole time. Exhibits TKE w/ heel strike when amb. Minimal swelling noted superior patella.     Celina Is progressing well towards her goals.   Pt prognosis is Good.     Pt will continue to benefit from skilled outpatient physical therapy to address the deficits listed in the problem list box on initial evaluation, provide pt/family education and to maximize pt's level of independence in the home and community environment.     Pt's spiritual, cultural and educational needs considered and pt agreeable to plan of care and goals.    Anticipated barriers to physical therapy: Language Barrier    Goals: Short Term Goals: 8-10 weeks  1. Pt will be compliant with HEP 50% of prescribed amount.   2. The pt to demo improvement in L knee ROM to equal R knee ROM pain free   3.  The pt to demo good quad set with proper hyperextension moment of the L knee   4. The pt to demo ambualting with elast restricitve AD witout major compensatory pattern L knee for at least 20 feet      Long Term Goals: 30 weeks   1. Pt will be compliant with % of prescribed amount.   2. The pt to demo pain free and uncompensated walking mechanics x10 min on an indoor treadmill  3. The pt to demo tolerance to a sit to stand 10x without HHA with uncompensated mechanics x10   4. The pt will report full participation in ADLs and IADLs without restrictions related to L knee.       Plan     Cont w/ " POC focusing on qaud strength and ROM- cont to educate pt to complete HEP and focus on extension     Felipa Higgins, PTA

## 2022-05-24 ENCOUNTER — CLINICAL SUPPORT (OUTPATIENT)
Dept: REHABILITATION | Facility: HOSPITAL | Age: 67
End: 2022-05-24
Payer: MEDICARE

## 2022-05-24 DIAGNOSIS — M25.469 PAIN AND SWELLING OF KNEE, UNSPECIFIED LATERALITY: ICD-10-CM

## 2022-05-24 DIAGNOSIS — R26.2 DIFFICULTY WALKING: Primary | ICD-10-CM

## 2022-05-24 DIAGNOSIS — M25.569 PAIN AND SWELLING OF KNEE, UNSPECIFIED LATERALITY: ICD-10-CM

## 2022-05-24 PROCEDURE — 97110 THERAPEUTIC EXERCISES: CPT | Mod: CQ

## 2022-05-24 PROCEDURE — 97530 THERAPEUTIC ACTIVITIES: CPT | Mod: CQ

## 2022-05-24 PROCEDURE — 97112 NEUROMUSCULAR REEDUCATION: CPT | Mod: CQ

## 2022-05-24 NOTE — PROGRESS NOTES
"Physical Therapy Daily Treatment Note     Name: Celina [place]  Clinic Number: [id number]    Therapy Diagnosis:   Encounter Diagnoses   Name Primary?    Difficulty walking Yes    Pain and swelling of knee, unspecified laterality      Physician: Teddy Valentin PA*    Visit Date: 5/24/2022  Physician Orders: PT Eval and Treat  Medical Diagnosis from Referral:   M25.562 (ICD-10-CM) - Left knee pain, unspecified chronicity   M23.201 (ICD-10-CM) - Old tear of lateral meniscus of left knee, unspecified tear type         Evaluation Date: 4/4/2022  Authorization Period Expiration: 3/29/2023  Plan of Care Expiration: 12/31/2021  Visit #/Visits authorized: 12/ 20     Time In: 1105 am  Time Out: 1200 pm  Total Billable Time: 55 minutes    Precautions: Standard    Subjective     Pt reports: Pt c/o minor L knee pain with good bit of swelling lately.  She was not compliant with home exercise program.  Response to previous treatment: na  Functional change: improved ROM    Pain: 0/10  Location: left knee      Objective   Pre-tx  Knee ext:  AROM pre-tx: 3 hypo   AROM Knee ext after Heel prop: 0 hyper    Gait: Sig antalgic with forefoot initial contact, sig knee flexion at stance phase and poor terminal stance.     Swelling: moderate.       Daily Note     Celina received therapeutic exercises to develop strength, endurance, ROM and core stabilization for 30 minutes including:  Bike x 10 min lvl 5  Heel prop ext S #7 above/below knee x 5 min  SAQ  X 3 min 5" hold #4    Therapeutic Activity: 15 min  Step ups 4" c Quad set 3 X 10  Shuttle DL #75 w/ YTB-np  SL #50 4x10    Celina participated in neuromuscular re-education activities to improve: Balance, Coordination, Kinesthetic and Proprioception for 10 minutes. The following activities were included:  Quad Sets 10x10s with heel prop   LAQ 4x10     received the following manual therapy techniques: Joint mobilizations and Soft tissue Mobilization were applied to the: L knee for " 00 minutes, including:  AP femoral mobs  Ext hinge mobs    Home Exercises Provided and Patient Education Provided     Education provided:   - Cont HEP    Written Home Exercises Provided: Patient instructed to cont prior HEP.  Exercises were reviewed and Celina was able to demonstrate them prior to the end of the session.  Celina demonstrated good  understanding of the education provided.     See EMR under Patient Instructions for exercises provided prior visit.    Assessment   Pt came into PT today with L knee brace on and noticeable antalgic gait. Cont to have poor carry over of hyper knee ext that is gain at end of each session.  Pt lacks quad activation for TKE during step ups but corrected after instructed pt to contract quad for full knee ext. Contine PT plan as follows.    Celina Is progressing well towards her goals.   Pt prognosis is Good.     Pt will continue to benefit from skilled outpatient physical therapy to address the deficits listed in the problem list box on initial evaluation, provide pt/family education and to maximize pt's level of independence in the home and community environment.     Pt's spiritual, cultural and educational needs considered and pt agreeable to plan of care and goals.    Anticipated barriers to physical therapy: Language Barrier    Goals: Short Term Goals: 8-10 weeks  1. Pt will be compliant with HEP 50% of prescribed amount.   2. The pt to demo improvement in L knee ROM to equal R knee ROM pain free   3.  The pt to demo good quad set with proper hyperextension moment of the L knee   4. The pt to demo ambualting with elast restricitve AD witout major compensatory pattern L knee for at least 20 feet      Long Term Goals: 30 weeks   1. Pt will be compliant with % of prescribed amount.   2. The pt to demo pain free and uncompensated walking mechanics x10 min on an indoor treadmill  3. The pt to demo tolerance to a sit to stand 10x without HHA with uncompensated  mechanics x10   4. The pt will report full participation in ADLs and IADLs without restrictions related to L knee.       Plan     Cont w/ POC focusing on qaud strength and ROM- cont to educate pt to complete HEP and focus on extension   ROMANA Shelton  I certify that I was present in the room directing the student in service delivery and guiding them using my skilled judgment. As the co-signing therapist I have reviewed the students documentation and am responsible for the treatment, assessment, and plan.       Felipa Higgins, PTA

## 2022-05-26 ENCOUNTER — CLINICAL SUPPORT (OUTPATIENT)
Dept: REHABILITATION | Facility: HOSPITAL | Age: 67
End: 2022-05-26
Payer: MEDICARE

## 2022-05-26 DIAGNOSIS — M25.569 PAIN AND SWELLING OF KNEE, UNSPECIFIED LATERALITY: ICD-10-CM

## 2022-05-26 DIAGNOSIS — M25.469 PAIN AND SWELLING OF KNEE, UNSPECIFIED LATERALITY: ICD-10-CM

## 2022-05-26 DIAGNOSIS — R26.2 DIFFICULTY WALKING: Primary | ICD-10-CM

## 2022-05-26 PROCEDURE — 97530 THERAPEUTIC ACTIVITIES: CPT | Mod: CQ

## 2022-05-26 PROCEDURE — 97110 THERAPEUTIC EXERCISES: CPT | Mod: CQ

## 2022-05-26 PROCEDURE — 97112 NEUROMUSCULAR REEDUCATION: CPT | Mod: CQ

## 2022-05-26 NOTE — PROGRESS NOTES
"Physical Therapy Daily Treatment Note     Name: Celina Azevedo  Clinic Number: [id number]    Therapy Diagnosis:   Encounter Diagnoses   Name Primary?    Difficulty walking Yes    Pain and swelling of knee, unspecified laterality      Physician: Teddy Valenitn PA*    Visit Date: 5/26/2022  Physician Orders: PT Eval and Treat  Medical Diagnosis from Referral:   M25.562 (ICD-10-CM) - Left knee pain, unspecified chronicity   M23.201 (ICD-10-CM) - Old tear of lateral meniscus of left knee, unspecified tear type         Evaluation Date: 4/4/2022  Authorization Period Expiration: 3/29/2023  Plan of Care Expiration: 12/31/2021  Visit #/Visits authorized: 13/ 20     Time In: 1105 am  Time Out: 1200 pm  Total Billable Time: 55 minutes    Precautions: Standard    Subjective     Pt reports: Pt c/o minor L knee pain with good bit of swelling lately.  She was not compliant with home exercise program.  Response to previous treatment: na  Functional change: improved ROM    Pain: 0/10  Location: left knee      Objective   Pre-tx  Knee ext:  AROM pre-tx: 2 hypo   AROM Knee ext after Heel prop: 0 hyper    Gait: amb on bent knee, shorten step length    Swelling: moderate.       Daily Note     Celina received therapeutic exercises to develop strength, endurance, ROM and core stabilization for 30 minutes including:  Bike x 10 min lvl 5  Heel prop ext S #7 above/below knee x 5 min  SAQ  X 3 min 5" hold #4    Therapeutic Activity: 15 min  Step ups 4" c Quad set 3 X 10-np  Shuttle   SL #50 4x10    Celina participated in neuromuscular re-education activities to improve: Balance, Coordination, Kinesthetic and Proprioception for 10 minutes. The following activities were included:  Quad Sets 10x10s with heel prop   LAQ 4x10     received the following manual therapy techniques: Joint mobilizations and Soft tissue Mobilization were applied to the: L knee for 00 minutes, including:  AP femoral mobs  Ext hinge mobs    Home Exercises " Provided and Patient Education Provided     Education provided:   - Cont HEP    Written Home Exercises Provided: Patient instructed to cont prior HEP.  Exercises were reviewed and Celina was able to demonstrate them prior to the end of the session.  Celina demonstrated good  understanding of the education provided.     See EMR under Patient Instructions for exercises provided prior visit.    Assessment   Pt knee ext at 2 hypo beginning treatment and decreased to 0 after heel prop stretch. Pt is improving on performing PT exercises with less VC/TC to maintain quad activation. Tolerated PT Tx well with no adverse reactions.  Celina Is progressing well towards her goals.   Pt prognosis is Good.     Pt will continue to benefit from skilled outpatient physical therapy to address the deficits listed in the problem list box on initial evaluation, provide pt/family education and to maximize pt's level of independence in the home and community environment.     Pt's spiritual, cultural and educational needs considered and pt agreeable to plan of care and goals.    Anticipated barriers to physical therapy: Language Barrier    Goals: Short Term Goals: 8-10 weeks  1. Pt will be compliant with HEP 50% of prescribed amount.   2. The pt to demo improvement in L knee ROM to equal R knee ROM pain free   3.  The pt to demo good quad set with proper hyperextension moment of the L knee   4. The pt to demo ambualting with elast restricitve AD witout major compensatory pattern L knee for at least 20 feet      Long Term Goals: 30 weeks   1. Pt will be compliant with % of prescribed amount.   2. The pt to demo pain free and uncompensated walking mechanics x10 min on an indoor treadmill  3. The pt to demo tolerance to a sit to stand 10x without HHA with uncompensated mechanics x10   4. The pt will report full participation in ADLs and IADLs without restrictions related to L knee.       Plan     Cont w/ POC focusing on qaud  strength and ROM- cont to educate pt to complete HEP and focus on extension         ROMANA Shelton    I certify that I was present in the room directing the student in service delivery and guiding them using my skilled judgment. As the co-signing therapist I have reviewed the students documentation and am responsible for the treatment, assessment, and plan.       Felipa Higgins, PTA

## 2022-06-01 ENCOUNTER — CLINICAL SUPPORT (OUTPATIENT)
Dept: REHABILITATION | Facility: HOSPITAL | Age: 67
End: 2022-06-01
Payer: MEDICARE

## 2022-06-01 DIAGNOSIS — R26.2 DIFFICULTY WALKING: Primary | ICD-10-CM

## 2022-06-01 DIAGNOSIS — M25.569 PAIN AND SWELLING OF KNEE, UNSPECIFIED LATERALITY: ICD-10-CM

## 2022-06-01 DIAGNOSIS — M25.469 PAIN AND SWELLING OF KNEE, UNSPECIFIED LATERALITY: ICD-10-CM

## 2022-06-01 PROCEDURE — 97110 THERAPEUTIC EXERCISES: CPT | Mod: CQ

## 2022-06-01 PROCEDURE — 97112 NEUROMUSCULAR REEDUCATION: CPT | Mod: CQ

## 2022-06-01 PROCEDURE — 97530 THERAPEUTIC ACTIVITIES: CPT | Mod: CQ

## 2022-06-01 NOTE — PROGRESS NOTES
"Physical Therapy Daily Treatment Note     Name: Celina Azevedo  Clinic Number: [id number]    Therapy Diagnosis:   Encounter Diagnoses   Name Primary?    Difficulty walking Yes    Pain and swelling of knee, unspecified laterality      Physician: Teddy Valentin PA*    Visit Date: 6/1/2022  Physician Orders: PT Eval and Treat  Medical Diagnosis from Referral:   M25.562 (ICD-10-CM) - Left knee pain, unspecified chronicity   M23.201 (ICD-10-CM) - Old tear of lateral meniscus of left knee, unspecified tear type         Evaluation Date: 4/4/2022  Authorization Period Expiration: 3/29/2023  Plan of Care Expiration: 12/31/2021  Visit #/Visits authorized: 13/ 20     Time In: 2:05 pm  Time Out: 3:00 pm  Total Billable Time: 55 minutes    Precautions: Standard    Subjective     Pt reports: Knee has been okay, just still dealing with swelling.  She was not compliant with home exercise program.  Response to previous treatment: na  Functional change: improved ROM    Pain: 0/10  Location: left knee      Objective   Pre-tx  Knee ext:  AROM pre-tx: 3 hypo   AROM Knee ext after Heel prop: 0 hyper    Gait: amb on bent knee, shorten step length    Swelling: moderate.       Daily Note     Celina received therapeutic exercises to develop strength, endurance, ROM and core stabilization for 30 minutes including:  Bike x 10 min lvl 5  Heel prop ext S #7 above/below knee x 5 min  SAQ  X 3 min 5" hold #4    Therapeutic Activity: 15 min  Shuttle   DL #75 4x10, SL #50 4x10     Celina participated in neuromuscular re-education activities to improve: Balance, Coordination, Kinesthetic and Proprioception for 10 minutes. The following activities were included:  Quad Sets 10x10s with heel prop   LAQ 4x10     received the following manual therapy techniques: Joint mobilizations and Soft tissue Mobilization were applied to the: L knee for 00 minutes, including:  AP femoral mobs  Ext hinge mobs    Home Exercises Provided and Patient Education " Provided     Education provided:   - Cont HEP    Written Home Exercises Provided: Patient instructed to cont prior HEP.  Exercises were reviewed and Celina was able to demonstrate them prior to the end of the session.  Celina demonstrated good  understanding of the education provided.     See EMR under Patient Instructions for exercises provided prior visit.    Assessment   Pt still having trouble with swelling before PT session. Knee ext was 3 hypo and decreased to 0 after heel prop before starting session. Needs VC/TC to help improve quad activation and TKE. Tolerated PT Tx well today with no adverse reactions.    Celina Is progressing well towards her goals.     Pt prognosis is Good.     Pt will continue to benefit from skilled outpatient physical therapy to address the deficits listed in the problem list box on initial evaluation, provide pt/family education and to maximize pt's level of independence in the home and community environment.     Pt's spiritual, cultural and educational needs considered and pt agreeable to plan of care and goals.    Anticipated barriers to physical therapy: Language Barrier    Goals: Short Term Goals: 8-10 weeks  1. Pt will be compliant with HEP 50% of prescribed amount.   2. The pt to demo improvement in L knee ROM to equal R knee ROM pain free   3.  The pt to demo good quad set with proper hyperextension moment of the L knee   4. The pt to demo ambualting with elast restricitve AD witout major compensatory pattern L knee for at least 20 feet      Long Term Goals: 30 weeks   1. Pt will be compliant with % of prescribed amount.   2. The pt to demo pain free and uncompensated walking mechanics x10 min on an indoor treadmill  3. The pt to demo tolerance to a sit to stand 10x without HHA with uncompensated mechanics x10   4. The pt will report full participation in ADLs and IADLs without restrictions related to L knee.       Plan     Cont w/ POC focusing on qaud strength  and ROM- cont to educate pt to complete HEP and focus on extension         ROMANA Shelton    I certify that I was present in the room directing the student in service delivery and guiding them using my skilled judgment. As the co-signing therapist I have reviewed the students documentation and am responsible for the treatment, assessment, and plan.       Felipa Higgins, PTA

## 2022-06-08 ENCOUNTER — CLINICAL SUPPORT (OUTPATIENT)
Dept: REHABILITATION | Facility: HOSPITAL | Age: 67
End: 2022-06-08
Payer: MEDICARE

## 2022-06-08 DIAGNOSIS — M25.469 PAIN AND SWELLING OF KNEE, UNSPECIFIED LATERALITY: ICD-10-CM

## 2022-06-08 DIAGNOSIS — R26.2 DIFFICULTY WALKING: Primary | ICD-10-CM

## 2022-06-08 DIAGNOSIS — M25.569 PAIN AND SWELLING OF KNEE, UNSPECIFIED LATERALITY: ICD-10-CM

## 2022-06-08 PROCEDURE — 97110 THERAPEUTIC EXERCISES: CPT

## 2022-06-09 NOTE — PROGRESS NOTES
Physical Therapy Daily Treatment Note     Name: Celina Eureka  Clinic Number: [id number]    Therapy Diagnosis:   Encounter Diagnoses   Name Primary?    Difficulty walking Yes    Pain and swelling of knee, unspecified laterality      Physician: Teddy Valentin PA*    Visit Date: 6/8/2022  Physician Orders: PT Eval and Treat  Medical Diagnosis from Referral:   M25.562 (ICD-10-CM) - Left knee pain, unspecified chronicity   M23.201 (ICD-10-CM) - Old tear of lateral meniscus of left knee, unspecified tear type         Evaluation Date: 4/4/2022  Authorization Period Expiration: 3/29/2023  Plan of Care Expiration: 12/31/2022  Visit #/Visits authorized: 15/ 20     Time In: 1115  Time Out: 1200   Total Billable Time: 40 minutes    Precautions: Standard    Subjective     Pt reports: she has been having a lot of pain and swelling, she cannot walk up the stairs due to the pain, has not been doing her exercises at home like discussed, has not been walking with the cane and thinks her surgery is failing.   She was not compliant with home exercise program.  Response to previous treatment: na  Functional change: improved ROM    Pain: 0/10  Location: left knee      Objective   Knee ROM:   R: 5-0-140   L: X-5-130 *pain end ranges   Swelling: moderate L knee   Quad Set: Fair   SLR: unable L, LAQ unable L good R       Daily Note     Celina received therapeutic exercises to develop strength, endurance, ROM and core stabilization for 30 minutes including:  Bike x 5 min lvl 5  Heel prop ext 10# 10m   Calf Raises 3x15   Education x15m     Celina participated in neuromuscular re-education activities to improve: Balance, Coordination, Kinesthetic and Proprioception for 10 minutes. The following activities were included:  Quad Sets 10x10s with heel prop   SAQ 4x10       Home Exercises Provided and Patient Education Provided     Education provided:   - Cont HEP    Written Home Exercises Provided: Patient instructed to cont prior  HEP.  Exercises were reviewed and Celina was able to demonstrate them prior to the end of the session.  Celina demonstrated good  understanding of the education provided.     See EMR under Patient Instructions for exercises provided prior visit.    Assessment   The patient was re-assessed today and found to continue to have ROM deficits, strength deficits and altered gait mechanics greatly impacting her recovery and her ability to progress. The patient advised heavily on importance of HEP and use of  to assist in her session as most of her session was spent using an kathy  per the pt's request. The patient verbalized understanding.     Treatment assisted by Felipa Higgins PTA     Celina Is progressing well towards her goals.     Pt prognosis is Good.     Pt will continue to benefit from skilled outpatient physical therapy to address the deficits listed in the problem list box on initial evaluation, provide pt/family education and to maximize pt's level of independence in the home and community environment.     Pt's spiritual, cultural and educational needs considered and pt agreeable to plan of care and goals.    Anticipated barriers to physical therapy: Language Barrier    Goals: Short Term Goals: 8-10 weeks (progressing not met)  1. Pt will be compliant with HEP 50% of prescribed amount.  2. The pt to demo improvement in L knee ROM to equal R knee ROM pain free   3.  The pt to demo good quad set with proper hyperextension moment of the L knee   4. The pt to demo ambualting with elast restricitve AD witout major compensatory pattern L knee for at least 20 feet      Long Term Goals: 30 weeks (progressing not met)   1. Pt will be compliant with % of prescribed amount.   2. The pt to demo pain free and uncompensated walking mechanics x10 min on an indoor treadmill  3. The pt to demo tolerance to a sit to stand 10x without HHA with uncompensated mechanics x10   4. The pt will report  full participation in ADLs and IADLs without restrictions related to L knee.       Plan     Cont w/ POC focusing on qaud strength and ROM- cont to educate pt to complete HEP and focus on extension       Isabel Tineo, PT

## 2022-06-09 NOTE — PLAN OF CARE
Physical Therapy Daily Treatment Note     Name: Celina Downey  Clinic Number: [id number]    Therapy Diagnosis:   Encounter Diagnoses   Name Primary?    Difficulty walking Yes    Pain and swelling of knee, unspecified laterality      Physician: Teddy Valentin PA*    Visit Date: 6/8/2022  Physician Orders: PT Eval and Treat  Medical Diagnosis from Referral:   M25.562 (ICD-10-CM) - Left knee pain, unspecified chronicity   M23.201 (ICD-10-CM) - Old tear of lateral meniscus of left knee, unspecified tear type         Evaluation Date: 4/4/2022  Authorization Period Expiration: 3/29/2023  Plan of Care Expiration: 12/31/2022  Visit #/Visits authorized: 15/ 20     Time In: 1115  Time Out: 1200   Total Billable Time: 40 minutes    Precautions: Standard    Subjective     Pt reports: she has been having a lot of pain and swelling, she cannot walk up the stairs due to the pain, has not been doing her exercises at home like discussed, has not been walking with the cane and thinks her surgery is failing.   She was not compliant with home exercise program.  Response to previous treatment: na  Functional change: improved ROM    Pain: 0/10  Location: left knee      Objective   Knee ROM:   R: 5-0-140   L: X-5-130 *pain end ranges   Swelling: moderate L knee   Quad Set: Fair   SLR: unable L, LAQ unable L good R       Daily Note     Celina received therapeutic exercises to develop strength, endurance, ROM and core stabilization for 30 minutes including:  Bike x 5 min lvl 5  Heel prop ext 10# 10m   Calf Raises 3x15   Education x15m     Celina participated in neuromuscular re-education activities to improve: Balance, Coordination, Kinesthetic and Proprioception for 10 minutes. The following activities were included:  Quad Sets 10x10s with heel prop   SAQ 4x10       Home Exercises Provided and Patient Education Provided     Education provided:   - Cont HEP    Written Home Exercises Provided: Patient instructed to cont prior  HEP.  Exercises were reviewed and Celina was able to demonstrate them prior to the end of the session.  Celina demonstrated good  understanding of the education provided.     See EMR under Patient Instructions for exercises provided prior visit.    Assessment   The patient was re-assessed today and found to continue to have ROM deficits, strength deficits and altered gait mechanics greatly impacting her recovery and her ability to progress. The patient advised heavily on importance of HEP and use of  to assist in her session as most of her session was spent using an kathy  per the pt's request. The patient verbalized understanding.     Treatment assisted by Felipa Higgins PTA     Celina Is progressing well towards her goals.     Pt prognosis is Good.     Pt will continue to benefit from skilled outpatient physical therapy to address the deficits listed in the problem list box on initial evaluation, provide pt/family education and to maximize pt's level of independence in the home and community environment.     Pt's spiritual, cultural and educational needs considered and pt agreeable to plan of care and goals.    Anticipated barriers to physical therapy: Language Barrier    Goals: Short Term Goals: 8-10 weeks (progressing not met)  1. Pt will be compliant with HEP 50% of prescribed amount.  2. The pt to demo improvement in L knee ROM to equal R knee ROM pain free   3.  The pt to demo good quad set with proper hyperextension moment of the L knee   4. The pt to demo ambualting with elast restricitve AD witout major compensatory pattern L knee for at least 20 feet      Long Term Goals: 30 weeks (progressing not met)   1. Pt will be compliant with % of prescribed amount.   2. The pt to demo pain free and uncompensated walking mechanics x10 min on an indoor treadmill  3. The pt to demo tolerance to a sit to stand 10x without HHA with uncompensated mechanics x10   4. The pt will report  full participation in ADLs and IADLs without restrictions related to L knee.       Plan     Cont w/ POC focusing on qaud strength and ROM- cont to educate pt to complete HEP and focus on extension       Isabel Tineo, PT

## 2022-06-16 ENCOUNTER — PATIENT MESSAGE (OUTPATIENT)
Dept: SPORTS MEDICINE | Facility: CLINIC | Age: 67
End: 2022-06-16
Payer: MEDICARE

## 2022-07-07 ENCOUNTER — OFFICE VISIT (OUTPATIENT)
Dept: URGENT CARE | Facility: CLINIC | Age: 67
End: 2022-07-07
Payer: MEDICARE

## 2022-07-07 VITALS
DIASTOLIC BLOOD PRESSURE: 74 MMHG | BODY MASS INDEX: 28.88 KG/M2 | HEART RATE: 69 BPM | TEMPERATURE: 98 F | RESPIRATION RATE: 18 BRPM | HEIGHT: 63 IN | OXYGEN SATURATION: 96 % | SYSTOLIC BLOOD PRESSURE: 138 MMHG | WEIGHT: 163 LBS

## 2022-07-07 DIAGNOSIS — J02.9 SORE THROAT: Primary | ICD-10-CM

## 2022-07-07 DIAGNOSIS — J02.9 ACUTE PHARYNGITIS, UNSPECIFIED ETIOLOGY: ICD-10-CM

## 2022-07-07 LAB
CTP QC/QA: YES
CTP QC/QA: YES
MOLECULAR STREP A: NEGATIVE
SARS-COV-2 RDRP RESP QL NAA+PROBE: NEGATIVE

## 2022-07-07 PROCEDURE — U0002: ICD-10-PCS | Mod: QW,S$GLB,, | Performed by: FAMILY MEDICINE

## 2022-07-07 PROCEDURE — 3078F DIAST BP <80 MM HG: CPT | Mod: CPTII,S$GLB,, | Performed by: FAMILY MEDICINE

## 2022-07-07 PROCEDURE — 1125F AMNT PAIN NOTED PAIN PRSNT: CPT | Mod: CPTII,S$GLB,, | Performed by: FAMILY MEDICINE

## 2022-07-07 PROCEDURE — 1125F PR PAIN SEVERITY QUANTIFIED, PAIN PRESENT: ICD-10-PCS | Mod: CPTII,S$GLB,, | Performed by: FAMILY MEDICINE

## 2022-07-07 PROCEDURE — 87651 POCT STREP A MOLECULAR: ICD-10-PCS | Mod: QW,S$GLB,, | Performed by: FAMILY MEDICINE

## 2022-07-07 PROCEDURE — 3008F PR BODY MASS INDEX (BMI) DOCUMENTED: ICD-10-PCS | Mod: CPTII,S$GLB,, | Performed by: FAMILY MEDICINE

## 2022-07-07 PROCEDURE — 3075F PR MOST RECENT SYSTOLIC BLOOD PRESS GE 130-139MM HG: ICD-10-PCS | Mod: CPTII,S$GLB,, | Performed by: FAMILY MEDICINE

## 2022-07-07 PROCEDURE — 3044F HG A1C LEVEL LT 7.0%: CPT | Mod: CPTII,S$GLB,, | Performed by: FAMILY MEDICINE

## 2022-07-07 PROCEDURE — 3075F SYST BP GE 130 - 139MM HG: CPT | Mod: CPTII,S$GLB,, | Performed by: FAMILY MEDICINE

## 2022-07-07 PROCEDURE — 1159F MED LIST DOCD IN RCRD: CPT | Mod: CPTII,S$GLB,, | Performed by: FAMILY MEDICINE

## 2022-07-07 PROCEDURE — 3044F PR MOST RECENT HEMOGLOBIN A1C LEVEL <7.0%: ICD-10-PCS | Mod: CPTII,S$GLB,, | Performed by: FAMILY MEDICINE

## 2022-07-07 PROCEDURE — 99214 PR OFFICE/OUTPT VISIT, EST, LEVL IV, 30-39 MIN: ICD-10-PCS | Mod: S$GLB,,, | Performed by: FAMILY MEDICINE

## 2022-07-07 PROCEDURE — U0002 COVID-19 LAB TEST NON-CDC: HCPCS | Mod: QW,S$GLB,, | Performed by: FAMILY MEDICINE

## 2022-07-07 PROCEDURE — 3078F PR MOST RECENT DIASTOLIC BLOOD PRESSURE < 80 MM HG: ICD-10-PCS | Mod: CPTII,S$GLB,, | Performed by: FAMILY MEDICINE

## 2022-07-07 PROCEDURE — 3008F BODY MASS INDEX DOCD: CPT | Mod: CPTII,S$GLB,, | Performed by: FAMILY MEDICINE

## 2022-07-07 PROCEDURE — 1159F PR MEDICATION LIST DOCUMENTED IN MEDICAL RECORD: ICD-10-PCS | Mod: CPTII,S$GLB,, | Performed by: FAMILY MEDICINE

## 2022-07-07 PROCEDURE — 87651 STREP A DNA AMP PROBE: CPT | Mod: QW,S$GLB,, | Performed by: FAMILY MEDICINE

## 2022-07-07 PROCEDURE — 99214 OFFICE O/P EST MOD 30 MIN: CPT | Mod: S$GLB,,, | Performed by: FAMILY MEDICINE

## 2022-07-07 RX ORDER — LORATADINE 10 MG/1
10 TABLET ORAL DAILY
Qty: 30 TABLET | Refills: 2 | Status: SHIPPED | OUTPATIENT
Start: 2022-07-07 | End: 2022-08-03

## 2022-07-07 RX ORDER — AMOXICILLIN 875 MG/1
875 TABLET, FILM COATED ORAL 2 TIMES DAILY
Qty: 20 TABLET | Refills: 0 | Status: SHIPPED | OUTPATIENT
Start: 2022-07-07 | End: 2022-07-17

## 2022-07-07 NOTE — PROGRESS NOTES
"Subjective:       Patient ID: Celina Azevedo is a 66 y.o. female.    Vitals:  height is 5' 3" (1.6 m) and weight is 73.9 kg (163 lb). Her temperature is 97.9 °F (36.6 °C). Her blood pressure is 138/74 and her pulse is 69. Her respiration is 18 and oxygen saturation is 96%.     Chief Complaint: Sore Throat and Back Pain    Patient presents to clinic with sore throat/yellow phlegm/upper back pain x 6 days.Denies f/c  Hurts to swallow  Constantly requesting ABX      Sore Throat   This is a new problem. The current episode started in the past 7 days. The problem has been unchanged. There has been no fever. The pain is at a severity of 8/10. The pain is moderate. Associated symptoms include coughing. Pertinent negatives include no abdominal pain, congestion, headaches or shortness of breath. She has tried nothing for the symptoms. The treatment provided no relief.   Back Pain  Pertinent negatives include no abdominal pain or headaches.       HENT: Positive for sore throat. Negative for congestion.    Respiratory: Positive for cough. Negative for shortness of breath.    Gastrointestinal: Negative for abdominal pain.   Musculoskeletal: Positive for back pain.   Neurological: Negative for headaches.       Objective:      Physical Exam   Constitutional: She is oriented to person, place, and time. She appears well-developed. She is cooperative.  Non-toxic appearance. She does not appear ill. No distress.   HENT:   Head: Normocephalic and atraumatic.   Ears:   Right Ear: Hearing, tympanic membrane, external ear and ear canal normal.   Left Ear: Hearing, tympanic membrane, external ear and ear canal normal.   Nose: Nose normal. No mucosal edema, rhinorrhea or nasal deformity. No epistaxis. Right sinus exhibits no maxillary sinus tenderness and no frontal sinus tenderness. Left sinus exhibits no maxillary sinus tenderness and no frontal sinus tenderness.   Mouth/Throat: Uvula is midline, oropharynx is clear and moist and mucous " membranes are normal. Mucous membranes are moist. No trismus in the jaw. Normal dentition. No uvula swelling. Oropharynx is clear.   Eyes: Conjunctivae and lids are normal. Pupils are equal, round, and reactive to light. Right eye exhibits no discharge. Left eye exhibits no discharge. No scleral icterus. Extraocular movement intact   Neck: Trachea normal and phonation normal. Neck supple.   Cardiovascular: Normal rate, regular rhythm, normal heart sounds and normal pulses.   Pulmonary/Chest: Effort normal and breath sounds normal. No respiratory distress.   Abdominal: Normal appearance and bowel sounds are normal. She exhibits no distension, no pulsatile midline mass and no mass. Soft. There is no abdominal tenderness.   Musculoskeletal: Normal range of motion.         General: No deformity. Normal range of motion.   Neurological: She is alert and oriented to person, place, and time. She exhibits normal muscle tone. Coordination normal.   Skin: Skin is warm, dry, intact, not diaphoretic and not pale.   Psychiatric: Her speech is normal and behavior is normal. Judgment and thought content normal.   Nursing note and vitals reviewed.        Assessment:       1. Sore throat    2. Acute pharyngitis, unspecified etiology          Plan:         Sore throat  -     POCT COVID-19 Rapid Screening  -     POCT Strep A, Molecular    Acute pharyngitis, unspecified etiology  -     POCT Strep A, Molecular             Gargle with warm salt water    If no improvement start ABX    Pt was given ABX since she would not accept NO    Results for orders placed or performed in visit on 07/07/22   POCT COVID-19 Rapid Screening   Result Value Ref Range    POC Rapid COVID Negative Negative     Acceptable Yes      Results for orders placed or performed in visit on 07/07/22   POCT COVID-19 Rapid Screening   Result Value Ref Range    POC Rapid COVID Negative Negative     Acceptable Yes    POCT Strep A, Molecular    Result Value Ref Range    Molecular Strep A, POC Negative Negative     Acceptable Yes

## 2022-08-03 ENCOUNTER — OFFICE VISIT (OUTPATIENT)
Dept: INTERNAL MEDICINE | Facility: CLINIC | Age: 67
End: 2022-08-03
Payer: MEDICARE

## 2022-08-03 ENCOUNTER — LAB VISIT (OUTPATIENT)
Dept: LAB | Facility: HOSPITAL | Age: 67
End: 2022-08-03
Attending: INTERNAL MEDICINE
Payer: MEDICARE

## 2022-08-03 VITALS
HEIGHT: 63 IN | OXYGEN SATURATION: 98 % | HEART RATE: 66 BPM | BODY MASS INDEX: 30.12 KG/M2 | TEMPERATURE: 98 F | SYSTOLIC BLOOD PRESSURE: 144 MMHG | WEIGHT: 170 LBS | DIASTOLIC BLOOD PRESSURE: 74 MMHG

## 2022-08-03 DIAGNOSIS — E78.5 HYPERLIPIDEMIA, UNSPECIFIED HYPERLIPIDEMIA TYPE: ICD-10-CM

## 2022-08-03 DIAGNOSIS — Z12.12 SCREENING FOR COLORECTAL CANCER: ICD-10-CM

## 2022-08-03 DIAGNOSIS — R73.02 IMPAIRED GLUCOSE TOLERANCE: ICD-10-CM

## 2022-08-03 DIAGNOSIS — Z12.11 SCREENING FOR COLORECTAL CANCER: ICD-10-CM

## 2022-08-03 DIAGNOSIS — M23.201 OLD TEAR OF LATERAL MENISCUS OF LEFT KNEE, UNSPECIFIED TEAR TYPE: Primary | ICD-10-CM

## 2022-08-03 PROBLEM — M25.569 PAIN AND SWELLING OF KNEE: Status: RESOLVED | Noted: 2021-03-10 | Resolved: 2022-08-03

## 2022-08-03 PROBLEM — R26.2 DIFFICULTY WALKING: Status: RESOLVED | Noted: 2022-04-20 | Resolved: 2022-08-03

## 2022-08-03 PROBLEM — M25.469 PAIN AND SWELLING OF KNEE: Status: RESOLVED | Noted: 2021-03-10 | Resolved: 2022-08-03

## 2022-08-03 PROBLEM — M25.462 EFFUSION OF LEFT KNEE: Status: RESOLVED | Noted: 2021-03-10 | Resolved: 2022-08-03

## 2022-08-03 PROCEDURE — 99214 OFFICE O/P EST MOD 30 MIN: CPT | Mod: S$GLB,,, | Performed by: INTERNAL MEDICINE

## 2022-08-03 PROCEDURE — 1159F PR MEDICATION LIST DOCUMENTED IN MEDICAL RECORD: ICD-10-PCS | Mod: CPTII,S$GLB,, | Performed by: INTERNAL MEDICINE

## 2022-08-03 PROCEDURE — 36415 COLL VENOUS BLD VENIPUNCTURE: CPT | Performed by: INTERNAL MEDICINE

## 2022-08-03 PROCEDURE — 99999 PR PBB SHADOW E&M-EST. PATIENT-LVL III: CPT | Mod: PBBFAC,,, | Performed by: INTERNAL MEDICINE

## 2022-08-03 PROCEDURE — 80053 COMPREHEN METABOLIC PANEL: CPT | Performed by: INTERNAL MEDICINE

## 2022-08-03 PROCEDURE — 99999 PR PBB SHADOW E&M-EST. PATIENT-LVL III: ICD-10-PCS | Mod: PBBFAC,,, | Performed by: INTERNAL MEDICINE

## 2022-08-03 PROCEDURE — 3008F PR BODY MASS INDEX (BMI) DOCUMENTED: ICD-10-PCS | Mod: CPTII,S$GLB,, | Performed by: INTERNAL MEDICINE

## 2022-08-03 PROCEDURE — 3078F PR MOST RECENT DIASTOLIC BLOOD PRESSURE < 80 MM HG: ICD-10-PCS | Mod: CPTII,S$GLB,, | Performed by: INTERNAL MEDICINE

## 2022-08-03 PROCEDURE — 3077F SYST BP >= 140 MM HG: CPT | Mod: CPTII,S$GLB,, | Performed by: INTERNAL MEDICINE

## 2022-08-03 PROCEDURE — 3077F PR MOST RECENT SYSTOLIC BLOOD PRESSURE >= 140 MM HG: ICD-10-PCS | Mod: CPTII,S$GLB,, | Performed by: INTERNAL MEDICINE

## 2022-08-03 PROCEDURE — 3078F DIAST BP <80 MM HG: CPT | Mod: CPTII,S$GLB,, | Performed by: INTERNAL MEDICINE

## 2022-08-03 PROCEDURE — 99214 PR OFFICE/OUTPT VISIT, EST, LEVL IV, 30-39 MIN: ICD-10-PCS | Mod: S$GLB,,, | Performed by: INTERNAL MEDICINE

## 2022-08-03 PROCEDURE — 3008F BODY MASS INDEX DOCD: CPT | Mod: CPTII,S$GLB,, | Performed by: INTERNAL MEDICINE

## 2022-08-03 PROCEDURE — 1125F PR PAIN SEVERITY QUANTIFIED, PAIN PRESENT: ICD-10-PCS | Mod: CPTII,S$GLB,, | Performed by: INTERNAL MEDICINE

## 2022-08-03 PROCEDURE — 1159F MED LIST DOCD IN RCRD: CPT | Mod: CPTII,S$GLB,, | Performed by: INTERNAL MEDICINE

## 2022-08-03 PROCEDURE — 80061 LIPID PANEL: CPT | Performed by: INTERNAL MEDICINE

## 2022-08-03 PROCEDURE — 1125F AMNT PAIN NOTED PAIN PRSNT: CPT | Mod: CPTII,S$GLB,, | Performed by: INTERNAL MEDICINE

## 2022-08-03 PROCEDURE — 3044F PR MOST RECENT HEMOGLOBIN A1C LEVEL <7.0%: ICD-10-PCS | Mod: CPTII,S$GLB,, | Performed by: INTERNAL MEDICINE

## 2022-08-03 PROCEDURE — 3044F HG A1C LEVEL LT 7.0%: CPT | Mod: CPTII,S$GLB,, | Performed by: INTERNAL MEDICINE

## 2022-08-03 PROCEDURE — 1160F PR REVIEW ALL MEDS BY PRESCRIBER/CLIN PHARMACIST DOCUMENTED: ICD-10-PCS | Mod: CPTII,S$GLB,, | Performed by: INTERNAL MEDICINE

## 2022-08-03 PROCEDURE — 1160F RVW MEDS BY RX/DR IN RCRD: CPT | Mod: CPTII,S$GLB,, | Performed by: INTERNAL MEDICINE

## 2022-08-03 NOTE — PROGRESS NOTES
[facility] Note    Chief Complaint      Chief Complaint   Patient presents with    Follow-up     Follow up from knee surgery.     Establish Care    Knee Pain     Left knee swelling and pain since procedure in April      History of Present Illness      Celina Azevedo is a 66 y.o. female who presents today for chief complaint follow up knee pain    HPI   Here today with sister who is translating today, patient able to mostly communicate on her own   Was seen by Soraida in March for preop, had labs   S/p L knee arthroscopy with John in April for meniscal tear , did do PT but still with pain and swelling   No PCP prior to seeing NP Erick in March A1c 5.9. unremarkable cbc cmp     Dyslipidemia  with  is on lipitor, She was started on statin by NP in [place], has not had labs rechecked. Tolerating well no issues   Pap:   MMG: ordered  DEXA: ordered   Colonoscopy: due   Tobacco: never smoker   Other MDs:  I personally reviewed all past medical, surgical, social and family history.        Active Problem List with Overview Notes    Diagnosis Date Noted    Impaired glucose tolerance 08/03/2022    Hyperlipidemia 08/03/2022    Old tear of lateral meniscus of left knee 04/01/2022    Primary osteoarthritis of left knee 02/19/2021       Health Maintenance   Topic Date Due    TETANUS VACCINE  Never done    Mammogram  Never done    DEXA Scan  Never done    Lipid Panel  03/08/2027    Hepatitis C Screening  Completed       Past Medical History:   Diagnosis Date    Hyperlipidemia        Past Surgical History:   Procedure Laterality Date    ARTHROSCOPIC CHONDROPLASTY OF KNEE JOINT Left 4/1/2022    Procedure: ARTHROSCOPY, KNEE, WITH CHONDROPLASTY;  Surgeon: JEANINE Fuentes MD;  Location: [facility] OR;  Service: Orthopedics;  Laterality: Left;    HYSTERECTOMY      KNEE ARTHROSCOPY W/ MENISCECTOMY Left 4/1/2022    Procedure: ARTHROSCOPY, KNEE, WITH MENISCECTOMY partial lateral;  Surgeon: JEANINE  Cuba Fuentes MD;  Location: [facility];  Service: Orthopedics;  Laterality: Left;  regional with catheter- adductor  pericapsular injection: 30cc MILTON       family history is not on file.    Social History     Tobacco Use    Smoking status: Never Smoker    Smokeless tobacco: Never Used   Substance Use Topics    Alcohol use: No    Drug use: No       Review of Systems   Constitutional: Negative for chills, fever, malaise/fatigue and weight loss.   Respiratory: Negative for cough, sputum production, shortness of breath and wheezing.    Cardiovascular: Negative for chest pain, palpitations, orthopnea and leg swelling.   Gastrointestinal: Negative for constipation, diarrhea, nausea and vomiting.   Genitourinary: Negative for dysuria, frequency, hematuria and urgency.   Musculoskeletal: Positive for joint pain.        Outpatient Encounter Medications as of 8/3/2022   Medication Sig Dispense Refill    aspirin (ECOTRIN) 81 MG EC tablet Take 1 tablet (81 mg total) by mouth 2 (two) times daily. for 14 days 28 tablet 0    atorvastatin (LIPITOR) 20 MG tablet Take 1 tablet (20 mg total) by mouth once daily. 90 tablet 3    diclofenac sodium (VOLTAREN) 1 % Gel Apply 2 g topically once daily. 2 Tube 2    [DISCONTINUED] loratadine (CLARITIN) 10 mg tablet Take 1 tablet (10 mg total) by mouth once daily. 30 tablet 2     Facility-Administered Encounter Medications as of 8/3/2022   Medication Dose Route Frequency Provider Last Rate Last Admin    [DISCONTINUED] fentaNYL 50 mcg/mL injection  mcg   mcg Intravenous PRN Kermit Wing MD   50 mcg at 04/01/22 0630    [DISCONTINUED] LIDOcaine (PF) 10 mg/ml (1%) injection 10 mg  1 mL Intradermal Once Kermit Wing MD        [DISCONTINUED] midazolam (VERSED) 1 mg/mL injection 0.5-4 mg  0.5-4 mg Intravenous PRN Kermit Wing MD   1 mg at 04/01/22 0630    [DISCONTINUED] ropivacaine 0.2% [facility] PainPRO Pump infusion 500 ML   Perineural Continuous Kermit  "ALESHIA Wing MD   New Bag at 04/01/22 0830        Review of patient's allergies indicates:  No Known Allergies        Physical Exam      Vital Signs  Temp: 98.2 °F (36.8 °C)  Temp src: Oral  Pulse: 66  SpO2: 98 %  BP: (!) 144/74  BP Location: Right arm  Patient Position: Sitting  Pain Score:   4  Height and Weight  Height: 5' 3" (160 cm)  Weight: 77.1 kg (169 lb 15.6 oz)  BSA (Calculated - sq m): 1.85 sq meters  BMI (Calculated): 30.1  Weight in (lb) to have BMI = 25: 140.8]    Physical Exam  Vitals reviewed.   Constitutional:       General: She is not in acute distress.     Appearance: She is well-developed. She is not diaphoretic.   HENT:      Head: Normocephalic and atraumatic.      Right Ear: External ear normal.      Left Ear: External ear normal.      Nose: Nose normal.   Eyes:      General:         Right eye: No discharge.         Left eye: No discharge.      Conjunctiva/sclera: Conjunctivae normal.   Cardiovascular:      Rate and Rhythm: Normal rate and regular rhythm.      Heart sounds: Normal heart sounds.   Pulmonary:      Effort: Pulmonary effort is normal. No respiratory distress.      Breath sounds: Normal breath sounds. No wheezing.   Musculoskeletal:         General: Normal range of motion.      Cervical back: Normal range of motion.   Skin:     Coloration: Skin is not pale.      Findings: No rash.   Neurological:      Mental Status: She is alert and oriented to person, place, and time.   Psychiatric:         Behavior: Behavior normal.         Thought Content: Thought content normal.         Judgment: Judgment normal.          Laboratory:  CBC:  No results for input(s): WBC, RBC, HGB, HCT, PLT, MCV, MCH, MCHC in the last 2160 hours.  CMP:  No results for input(s): GLU, CALCIUM, ALBUMIN, PROT, NA, K, CO2, CL, BUN, ALKPHOS, ALT, AST, BILITOT in the last 2160 hours.    Invalid input(s): CREATININ  URINALYSIS:  No results for input(s): COLORU, CLARITYU, SPECGRAV, PHUR, PROTEINUA, GLUCOSEU, BILIRUBINCON, " BLOODU, WBCU, RBCU, BACTERIA, MUCUS, NITRITE, LEUKOCYTESUR, UROBILINOGEN, HYALINECASTS in the last 2160 hours.   LIPIDS:  No results for input(s): TSH, HDL, CHOL, TRIG, LDLCALC, CHOLHDL, NONHDLCHOL, TOTALCHOLEST in the last 2160 hours.  TSH:  No results for input(s): TSH in the last 2160 hours.  A1C:  No results for input(s): HGBA1C in the last 2160 hours.        Assessment/Plan     Celina Azevedo is a 66 y.o.female with:    1. Old tear of lateral meniscus of left knee, unspecified tear type  Assessment & Plan:  S/p arthroscopy, back to pt and if not improving back to ortho     Orders:  -     Ambulatory referral/consult to Physical/Occupational Therapy    2. Hyperlipidemia, unspecified hyperlipidemia type  Assessment & Plan:  Opal flp     Orders:  -     Comprehensive Metabolic Panel  -     Lipid Panel    3. Screening for colorectal cancer  -     Cologuard Screening (Multitarget Stool DNA)  -     Cologuard Screening (Multitarget Stool DNA)    4. Impaired glucose tolerance  Assessment & Plan:  Continue to monitor          Use of the [facility] Patient Portal discussed and encouraged during today's visit  -Continue current medications and maintain follow up with specialists.  Return to clinic in 6 mos.  No future appointments.    Nolvia Hernandez MD  8/3/2022 2:31 PM    Primary Care Internal Medicine

## 2022-08-04 ENCOUNTER — PATIENT MESSAGE (OUTPATIENT)
Dept: INTERNAL MEDICINE | Facility: CLINIC | Age: 67
End: 2022-08-04
Payer: MEDICARE

## 2022-08-04 DIAGNOSIS — R74.01 TRANSAMINITIS: Primary | ICD-10-CM

## 2022-08-04 LAB
ALBUMIN SERPL BCP-MCNC: 4 G/DL (ref 3.5–5.2)
ALP SERPL-CCNC: 148 U/L (ref 55–135)
ALT SERPL W/O P-5'-P-CCNC: 51 U/L (ref 10–44)
ANION GAP SERPL CALC-SCNC: 7 MMOL/L (ref 8–16)
AST SERPL-CCNC: 43 U/L (ref 10–40)
BILIRUB SERPL-MCNC: 0.3 MG/DL (ref 0.1–1)
BUN SERPL-MCNC: 14 MG/DL (ref 8–23)
CALCIUM SERPL-MCNC: 10.2 MG/DL (ref 8.7–10.5)
CHLORIDE SERPL-SCNC: 103 MMOL/L (ref 95–110)
CHOLEST SERPL-MCNC: 146 MG/DL (ref 120–199)
CHOLEST/HDLC SERPL: 2.2 {RATIO} (ref 2–5)
CO2 SERPL-SCNC: 31 MMOL/L (ref 23–29)
CREAT SERPL-MCNC: 0.8 MG/DL (ref 0.5–1.4)
EST. GFR  (NO RACE VARIABLE): >60 ML/MIN/1.73 M^2
GLUCOSE SERPL-MCNC: 89 MG/DL (ref 70–110)
HDLC SERPL-MCNC: 65 MG/DL (ref 40–75)
HDLC SERPL: 44.5 % (ref 20–50)
LDLC SERPL CALC-MCNC: 55.8 MG/DL (ref 63–159)
NONHDLC SERPL-MCNC: 81 MG/DL
POTASSIUM SERPL-SCNC: 4.4 MMOL/L (ref 3.5–5.1)
PROT SERPL-MCNC: 7.2 G/DL (ref 6–8.4)
SODIUM SERPL-SCNC: 141 MMOL/L (ref 136–145)
TRIGL SERPL-MCNC: 126 MG/DL (ref 30–150)

## 2022-08-04 NOTE — TELEPHONE ENCOUNTER
Please call pt as she is mostly Italian speaking, labs recheked yesterday shiw better cholesterol but worsened liver enzymes. Could be due to cholesterol medication.   Stop atorvastatin and we will recheck labs in 1 month   If she is taking tylenol for knee pain she will need to stop this as well

## 2022-08-16 ENCOUNTER — CLINICAL SUPPORT (OUTPATIENT)
Dept: REHABILITATION | Facility: HOSPITAL | Age: 67
End: 2022-08-16
Attending: INTERNAL MEDICINE
Payer: MEDICARE

## 2022-08-16 DIAGNOSIS — M23.201 OLD TEAR OF LATERAL MENISCUS OF LEFT KNEE, UNSPECIFIED TEAR TYPE: ICD-10-CM

## 2022-08-16 DIAGNOSIS — Z74.09 IMPAIRED FUNCTIONAL MOBILITY, BALANCE, GAIT, AND ENDURANCE: ICD-10-CM

## 2022-08-16 DIAGNOSIS — R29.898 DECREASED STRENGTH OF LOWER EXTREMITY: ICD-10-CM

## 2022-08-16 DIAGNOSIS — M25.662 DECREASED RANGE OF MOTION (ROM) OF LEFT KNEE: ICD-10-CM

## 2022-08-16 PROCEDURE — 97161 PT EVAL LOW COMPLEX 20 MIN: CPT

## 2022-08-16 NOTE — PLAN OF CARE
[facility]   Physical Therapy Initial Evaluation     Date: 8/16/2022   Name: Celina Azevedo  Clinic Number: [id number]    Therapy Diagnosis:   Encounter Diagnoses   Name Primary?    Old tear of lateral meniscus of left knee, unspecified tear type     Impaired functional mobility, balance, gait, and endurance     Decreased range of motion (ROM) of left knee     Decreased strength of lower extremity      Physician: Nolvia Hernandez MD    Physician Orders: PT Eval and Treat   Medical Diagnosis from Referral: M23.201 (ICD-10-CM) - Old tear of lateral meniscus of left knee, unspecified tear type  Evaluation Date: 8/16/2022  Authorization Period Expiration: 8/3/2023  Plan of Care Expiration: 10/14/2022  Progress Note Due: 10th visits  Visit # / Visits authorized: 1/ 1   FOTO: 46%/28%    Precautions: Standard    REQUIRED     Time In: 3:15 pm  Time Out: 3:59 pm  Total Appointment Time (timed & untimed codes): 44 minutes      SUBJECTIVE     Date of onset: surgery 4/1/2022    History of current condition - Celina reports: she had knee surgery several months ago and continues to experience knee pain stating nothing makes the pain go away.  Patient reports pain is increased by ascending and descending stairs, and riding a bike. Patient reports non compliance with HEP or exercises from previous PT. Patient reports she is worried she will need surgery again.    Falls: no    Imaging, please see imaging    Prior Therapy: yes  Social History: my , 5 steps in back of home  Occupation: no  Prior Level of Function: pain with ADL's  Current Level of Function: stairs, walkimg long distances are painful and difficult; pain at night, unable to wear heels    Pain:  Current 7/10, worst 8/10, best 7/10   Location: left superior lateral tibiofemoral joint  Description:  stabbing  Aggravating Factors: Sitting, Standing, Night Time, Getting out of bed/chair and stairs  Easing Factors:  rest    Patients goals: decrease pain     Medical History:   Past Medical History:   Diagnosis Date    Hyperlipidemia        Surgical History:   Celina Azevedo  has a past surgical history that includes Hysterectomy; Knee arthroscopy w/ meniscectomy (Left, 4/1/2022); and Arthroscopic chondroplasty of knee joint (Left, 4/1/2022).    Medications:   Celina has a current medication list which includes the following prescription(s): aspirin, atorvastatin, and diclofenac sodium.    Allergies:   Review of patient's allergies indicates:  No Known Allergies       OBJECTIVE   Observation: Pt ambulates with decreased knee extension at initial contact and through stance phase.     Postural examination: rounded shoulder and Forward Head    Palpation: local swelling to superior lateral knee      Strength: manual muscle test grades below      Lower Extremity Strength  Right LE   Left LE     Hip Ext 3/5 Hip Ext 3/5    Hip Flexion: 3+/5 Hip Flexion: 4-/5   Hip ER:  3+/5 Hip ER: 3+/5   Hip IR: 3+/5 Hip IR: 3+/5   Hip Abduction: 3+/5  Hip Abduction 3+/5   Knee Extension: 4+/5 Knee Extension: 4-/5   Knee Flexion: 3+/5 Knee Flexion: 3+/5   Ankle Dorsiflexion: 4/5 Ankle Dorsiflexion: 4/5   Ankle Plantarflexion: 4/5 Ankle Plantarflexion: 4/5      Range of Motion:  R knee Active(Passive) L knee Active (Passive)   Flexion NT flexion 125 (129)   Extension NT extension 3 (+3)     Joint Mobility:  hypermobile patella at 30 degrees flexion and at rest    Edema: local swelling to lateral superior knee with margins    Balance Assessment:       Evaluation   Single Limb Stance R LE > 30 seconds HHA  (<10 sec = HIGH FALL RISK)   Single Limb Stance L LE Unable; self limiting  (<10 sec = HIGH FALL RISK)      Endurance Assessment:       Evaluation   30 sec STS  10x - hesistant to stand all the way up - poor control upon returning to seat         Limitation/Restriction for FOTO Knee Survey    Therapist reviewed FOTO scores for Celina Azevedo on  8/16/2022.   FOTO documents entered into [employer] - see Media section.    Limitation Score: 46%         TREATMENT     Total Treatment time (time-based codes) separate from Evaluation: 00 minutes      Celina received the treatments listed below:      PATIENT EDUCATION AND HOME EXERCISES     Education provided:   - HEP    Written Home Exercises Provided: yes. Exercises were reviewed and Celina was able to demonstrate them prior to the end of the session.  Celina demonstrated fair  understanding of the education provided. See EMR under Patient Instructions for exercises provided during therapy sessions.    ASSESSMENT     Celina is a 66 y.o. female referred to outpatient Physical Therapy with a medical diagnosis of Old tear of lateral meniscus of left knee, unspecified tear type . Patient presents with left knee pain. Patient is 5 months s/p ARTHROSCOPY, KNEE, WITH MENISCECTOMY partial lateral (Left)  ARTHROSCOPY, KNEE, WITH CHONDROPLASTY (Left). Patient demonstrates decreased lower extremity strength, poor left quadriceps motor control and strength, difficulty ambulating short and long distances, and reduced functional mobility. Patient demonstrates decreased knee extension at initial contact and throughout stance phase on LLE.  Patient would benefit from skilled outpatient physical therapy to address motor control, strength and range of motion deficits so that she may return to full independence with all household and personal ADL's, and improve overall functional mobility, reduce risk for falls and meet all social and recreational needs.    Patient prognosis is Good.   Patient will benefit from skilled outpatient Physical Therapy to address the deficits stated above and in the chart below, provide patient /family education, and to maximize patientt's level of independence.     Plan of care discussed with patient: Yes  Patient's spiritual, cultural and educational needs considered and patient is agreeable to the  plan of care and goals as stated below:     Anticipated Barriers for therapy: none    Medical Necessity is demonstrated by the following  History  Co-morbidities and personal factors that may impact the plan of care Co-morbidities:   high BMI and hyperlipidemia     Personal Factors:   no deficits     low   Examination  Body Structures and Functions, activity limitations and participation restrictions that may impact the plan of care Body Regions:   lower extremities    Body Systems:    ROM  strength  balance  gait  transfers  motor control    Participation Restrictions:   none    Activity limitations:   Learning and applying knowledge  no deficits    General Tasks and Commands  no deficits    Communication  no deficits    Mobility  walking  climbing stairs    Self care  no deficits    Domestic Life  shopping  cooking  doing house work (cleaning house, washing dishes, laundry)    Interactions/Relationships  no deficits    Life Areas  no deficits    Community and Social Life  no deficits         low   Clinical Presentation stable and uncomplicated low   Decision Making/ Complexity Score: low     Goals:  Short Term Goals (4 weeks)  1. Patient will be independent with home exercise program to supplement physical therapy treatment in improving functional status.  2. Pt will improve impaired lower extremity manual muscle tests to >/= 4-/5 to improve dynamic left knee support for closed chain tasks.  3. Pt will improve left knee extension ROM to 0 deg to improve mobility for functional tasks.  4. Pt will perform 10  consecutive STS, without compensation, to demonstrate improved functional mobility and LE endurance.     Long Term Goals (8 weeks)  1. Patient will perform reciprocal stair navigation for 1 flight of stairs with reports of < 2/10 pain.  2. Pt will improve impaired lower extremity manual muscle tests to >/= 4/5 to improve dynamic left knee support for closed chain tasks.  3. Patient will be independent with  updated home exercise program to supplement physical therapy treatment in improving functional status    PLAN   Plan of care Certification: 8/16/2022 to 10/14/2022.    Outpatient Physical Therapy 2 times weekly for 8 weeks to include the following interventions: Aquatic Therapy, Electrical Stimulation TENS, functional dry needling, Gait Training, Manual Therapy, Moist Heat/ Ice, Neuromuscular Re-ed, Orthotic Management and Training, Patient Education, Self Care, Therapeutic Activities and Therapeutic Exercise.     Fadumo Shen, PT      I CERTIFY THE NEED FOR THESE SERVICES FURNISHED UNDER THIS PLAN OF TREATMENT AND WHILE UNDER MY CARE   Physician's comments:     Physician's Signature: ___________________________________________________

## 2022-08-19 ENCOUNTER — CLINICAL SUPPORT (OUTPATIENT)
Dept: REHABILITATION | Facility: HOSPITAL | Age: 67
End: 2022-08-19
Payer: MEDICARE

## 2022-08-19 DIAGNOSIS — M25.662 DECREASED RANGE OF MOTION (ROM) OF LEFT KNEE: ICD-10-CM

## 2022-08-19 DIAGNOSIS — Z74.09 IMPAIRED FUNCTIONAL MOBILITY, BALANCE, GAIT, AND ENDURANCE: Primary | ICD-10-CM

## 2022-08-19 DIAGNOSIS — R29.898 DECREASED STRENGTH OF LOWER EXTREMITY: ICD-10-CM

## 2022-08-19 PROCEDURE — 97110 THERAPEUTIC EXERCISES: CPT | Mod: CQ

## 2022-08-19 NOTE — PROGRESS NOTES
"[facility]   Physical Therapy Treatment Note     Name: Celina Azevedo  Clinic Number: [id number]    Therapy Diagnosis:   Encounter Diagnoses   Name Primary?    Impaired functional mobility, balance, gait, and endurance Yes    Decreased range of motion (ROM) of left knee     Decreased strength of lower extremity      Physician: Nolvia Hernandez MD    Visit Date: 8/19/2022    Physician Orders: PT Eval and Treat   Medical Diagnosis from Referral: M23.201 (ICD-10-CM) - Old tear of lateral meniscus of left knee, unspecified tear type  Evaluation Date: 8/16/2022  Authorization Period Expiration: 12/31/2022  Plan of Care Expiration: 10/14/2022  Progress Note Due: 10th visits  Visit # / Visits authorized: 16 / 20 + eval (visit 2 of new episode)    PTA Visit #: 1 / 5     Time In: 1520; patient arrived 20 minutes late  Time Out: 1602  Total Treatment Time: 42 minutes  Total Billable Time: 42 minutes (3 TE)    Precautions: Standard    REQUIRED     SUBJECTIVE     Patient reports: that her pain is worse at night. She has been non-compliant with her HEP. States that her pain is "stabbing" and swelling has been a problem. She does not apply ice regularly. States that swelling goes away but comes back. She denies doing strenuous work at home.   She was not compliant with home exercise program.  Response to previous treatment: good; no adverse reaction  Functional change: none to note today    Pain: 7/10, currently  Location: Left knee    Patient goals: decrease pain    OBJECTIVE     Objective Measures updated at progress report unless specified.     Treatment     Celina received the treatments listed below:      THERAPEUTIC EXERCISES to develop strength, endurance, ROM, flexibility, posture and core stabilization for 42 minutes including;     Aerobic Activity to help improve cardiovascular endurance and mobility; Nustep for 8 minutes, Level 2    Quad sets with towel under knee; 5 " second hold, 30 reps  Quad sets with heel prop; 5 second hold, 30 reps  Short arc quads with medium bolster; 5 second hold, 30 reps  Short arc quads with small bolster; 5 second hold, 30 reps  Gastroc/hamstring stretch with heel prop; 30 second hold, 4 reps  Long arc quads at EOM; 5 second hold, 30 reps  HL Bridges with Yellow TB; 3 second hold, 2 x 10 reps  SL Clamshells with Yellow TB; 3 second hold, 2 x 10 reps    Patient Education and Home Exercises     Home Exercises Provided and Patient Education Provided     Education provided:   - Encouraged compliance with HEP  - Use of ice to help reduce pain    Written Home Exercises Provided: Patient instructed to cont prior HEP. Exercises were reviewed and Celina was able to demonstrate them prior to the end of the session.  eClina demonstrated good  understanding of the education provided. See EMR under Patient Instructions for exercises provided during therapy sessions    ASSESSMENT     Ms. Patrick presents to PT for his first follow up after initial evaluation. Use of  over iPad provided by [facility]. Patient demonstrated good tolerance to exercises performed; no exacerbation of pain. Good quadriceps activation. She reported appropriate muscle response post session. Continue progressing per patient tolerance.  Celina Is progressing well towards her goals.   Pt prognosis is Good.     Pt will continue to benefit from skilled outpatient physical therapy to address the deficits listed in the problem list box on initial evaluation, provide pt/family education and to maximize pt's level of independence in the home and community environment.     Pt's spiritual, cultural and educational needs considered and pt agreeable to plan of care and goals.     Anticipated barriers to physical therapy: none    Goals:   Short Term Goals (4 weeks)  1. Patient will be independent with home exercise program to supplement physical therapy treatment in improving functional  status.  2. Pt will improve impaired lower extremity manual muscle tests to >/= 4-/5 to improve dynamic left knee support for closed chain tasks.  3. Pt will improve left knee extension ROM to 0 deg to improve mobility for functional tasks.  4. Pt will perform 10  consecutive STS, without compensation, to demonstrate improved functional mobility and LE endurance.      Long Term Goals (8 weeks)  1. Patient will perform reciprocal stair navigation for 1 flight of stairs with reports of < 2/10 pain.  2. Pt will improve impaired lower extremity manual muscle tests to >/= 4/5 to improve dynamic left knee support for closed chain tasks.  3. Patient will be independent with updated home exercise program to supplement physical therapy treatment in improving functional status    PLAN     Emphasize quad control.    Penelope Cordova, PTA

## 2022-08-23 ENCOUNTER — CLINICAL SUPPORT (OUTPATIENT)
Dept: REHABILITATION | Facility: HOSPITAL | Age: 67
End: 2022-08-23
Payer: MEDICARE

## 2022-08-23 DIAGNOSIS — M25.662 DECREASED RANGE OF MOTION (ROM) OF LEFT KNEE: ICD-10-CM

## 2022-08-23 DIAGNOSIS — Z74.09 IMPAIRED FUNCTIONAL MOBILITY, BALANCE, GAIT, AND ENDURANCE: Primary | ICD-10-CM

## 2022-08-23 DIAGNOSIS — R29.898 DECREASED STRENGTH OF LOWER EXTREMITY: ICD-10-CM

## 2022-08-23 PROCEDURE — 97110 THERAPEUTIC EXERCISES: CPT

## 2022-08-23 NOTE — PROGRESS NOTES
[facility]   Physical Therapy Treatment Note     Name: Celina Azevedo  [facility] Number: [id number]    Therapy Diagnosis:   Encounter Diagnoses   Name Primary?    Impaired functional mobility, balance, gait, and endurance Yes    Decreased range of motion (ROM) of left knee     Decreased strength of lower extremity      Physician: Nolvia Hernandez MD    Visit Date: 8/23/2022    Physician Orders: PT Eval and Treat   Medical Diagnosis from Referral: M23.201 (ICD-10-CM) - Old tear of lateral meniscus of left knee, unspecified tear type  Evaluation Date: 8/16/2022  Authorization Period Expiration: 12/31/2022  Plan of Care Expiration: 10/14/2022  Progress Note Due: 10th visits  Visit # / Visits authorized: 16 / 20 + eval (visit 2 of new episode)    PTA Visit #: 1 / 5     Time In: 4:00 pm  Time Out: 4:49 pm  Total Treatment Time: 49 minutes  Total Billable Time: 35 minutes (2 TE)    Precautions: Standard    REQUIRED     SUBJECTIVE     Patient reports:that she performed her HEP twice since last treatment session  She was not compliant with home exercise program.  Response to previous treatment: good; no adverse reaction  Functional change: none to note today    Pain: 7/10, currently  Location: Left knee    Patient goals: decrease pain    OBJECTIVE     Objective Measures updated at progress report unless specified.     Treatment     Celina received the treatments listed below:      THERAPEUTIC EXERCISES to develop strength, endurance, ROM, flexibility, posture and core stabilization for 49 minutes including;     Aerobic Activity to help improve cardiovascular endurance and mobility; Nustep for 8 minutes, Level 2 - not today    Quad sets with towel under knee; 5 second hold, 30 reps  Quad sets with heel prop; 10 second hold, 15 reps  Short arc quads with medium bolster; 5 second hold, 30 reps  Short arc quads with small bolster; 5 second hold, 30 reps  Gastroc/hamstring  stretch with heel prop; 30 second hold, 4 reps  Long arc quads at EOM; 5 second hold, 30 reps 3 lbs each  HL Bridges with red TB; 10 second hold, 3 x 10 reps  SL Clamshells with red TB; 5 second hold, 3 x 10 reps    Patient Education and Home Exercises     Home Exercises Provided and Patient Education Provided     Education provided:   - Encouraged compliance with HEP  - Use of ice to help reduce pain    Written Home Exercises Provided: Patient instructed to cont prior HEP. Exercises were reviewed and Celina was able to demonstrate them prior to the end of the session.  Celina demonstrated good  understanding of the education provided. See EMR under Patient Instructions for exercises provided during therapy sessions    ASSESSMENT     Requires consistent cueing for exercise performance. Difficulty maintaining terminal knee extension during sidelying hip abduction.    Celina Is progressing well towards her goals.   Pt prognosis is Good.     Pt will continue to benefit from skilled outpatient physical therapy to address the deficits listed in the problem list box on initial evaluation, provide pt/family education and to maximize pt's level of independence in the home and community environment.     Pt's spiritual, cultural and educational needs considered and pt agreeable to plan of care and goals.     Anticipated barriers to physical therapy: none    Goals:   Short Term Goals (4 weeks)  1. Patient will be independent with home exercise program to supplement physical therapy treatment in improving functional status. Progressing, not met  2. Pt will improve impaired lower extremity manual muscle tests to >/= 4-/5 to improve dynamic left knee support for closed chain tasks.Progressing, not met  3. Pt will improve left knee extension ROM to 0 deg to improve mobility for functional tasks. Progressing, not met  4. Pt will perform 10  consecutive STS, without compensation, to demonstrate improved functional mobility and  LE endurance. Progressing, not met     Long Term Goals (8 weeks)  1. Patient will perform reciprocal stair navigation for 1 flight of stairs with reports of < 2/10 pain. Progressing, not met  2. Pt will improve impaired lower extremity manual muscle tests to >/= 4/5 to improve dynamic left knee support for closed chain tasks. Progressing, not met  3. Patient will be independent with updated home exercise program to supplement physical therapy treatment in improving functional status Progressing, not met    PLAN     Emphasize quad control.    aFdumo Shen, PT

## 2022-08-26 ENCOUNTER — CLINICAL SUPPORT (OUTPATIENT)
Dept: REHABILITATION | Facility: HOSPITAL | Age: 67
End: 2022-08-26
Payer: MEDICARE

## 2022-08-26 DIAGNOSIS — R29.898 DECREASED STRENGTH OF LOWER EXTREMITY: ICD-10-CM

## 2022-08-26 DIAGNOSIS — M25.662 DECREASED RANGE OF MOTION (ROM) OF LEFT KNEE: ICD-10-CM

## 2022-08-26 DIAGNOSIS — Z74.09 IMPAIRED FUNCTIONAL MOBILITY, BALANCE, GAIT, AND ENDURANCE: Primary | ICD-10-CM

## 2022-08-26 PROCEDURE — 97110 THERAPEUTIC EXERCISES: CPT

## 2022-08-26 NOTE — PROGRESS NOTES
[facility]   Physical Therapy Treatment Note     Name: Celina Azevedo  [facility] Number: [id number]    Therapy Diagnosis:   Encounter Diagnoses   Name Primary?    Impaired functional mobility, balance, gait, and endurance Yes    Decreased range of motion (ROM) of left knee     Decreased strength of lower extremity      Physician: Nolvia Hernandez MD    Visit Date: 8/26/2022    Physician Orders: PT Eval and Treat   Medical Diagnosis from Referral: M23.201 (ICD-10-CM) - Old tear of lateral meniscus of left knee, unspecified tear type  Evaluation Date: 8/16/2022  Authorization Period Expiration: 12/31/2022  Plan of Care Expiration: 10/14/2022  Progress Note Due: 10th visits  Visit # / Visits authorized: 18 / 20 + eval (visit 2 of new episode)    PTA Visit #: 0 / 5     Time In: 2:00 pm  Time Out: 2:50 pm  Total Treatment Time: 50 minutes  Total Billable Time: 50  minutes (3 TE)    Precautions: Standard    REQUIRED - patient declined 8/26/2022    SUBJECTIVE     Patient reports:that she still has pain, especially at night  She was not compliant with home exercise program.  Response to previous treatment: good; no adverse reaction  Functional change: none to note today    Pain: 5/10, currently  Location: Left knee    Patient goals: decrease pain    OBJECTIVE     Objective Measures updated at progress report unless specified.     Treatment     Celina received the treatments listed below:      THERAPEUTIC EXERCISES to develop strength, endurance, ROM, flexibility, posture and core stabilization for 50 minutes including;     Aerobic Activity to help improve cardiovascular endurance and mobility; Nustep for 8 minutes, Level 2 - not today    Quad sets with towel under knee; 5 second hold, 30 reps  Quad sets with heel prop; 10 second hold, 20 reps  Short arc quads with medium bolster; 5 second hold, 30 reps  Short arc quads with small bolster; 5 second hold, 30  reps  Gastroc/hamstring stretch with heel prop; 30 second hold, 4 reps  Long arc quads at EOM; 5 second hold, 30 reps 3 lbs each  HL Bridges with red TB; 10 second hold, 3 x 10 reps- not today  SL Clamshells with red TB; 5 second hold, 3 x 10 reps- not today  Straight leg raise: 3x10   sidelying hip abduction: 3x10  Step ups 6 inch 3x10    Patient Education and Home Exercises     Home Exercises Provided and Patient Education Provided     Education provided:   - Encouraged compliance with HEP  - Use of ice to help reduce pain    Written Home Exercises Provided: Patient instructed to cont prior HEP. Exercises were reviewed and Celina was able to demonstrate them prior to the end of the session.  Celina demonstrated good  understanding of the education provided. See EMR under Patient Instructions for exercises provided during therapy sessions    ASSESSMENT     Good tolerance for quadriceps strengthening. Continued difficulty with maintaining quadriceps motor control during SLR and sidelying hip abduction.     Celina Is progressing well towards her goals.   Pt prognosis is Good.     Pt will continue to benefit from skilled outpatient physical therapy to address the deficits listed in the problem list box on initial evaluation, provide pt/family education and to maximize pt's level of independence in the home and community environment.     Pt's spiritual, cultural and educational needs considered and pt agreeable to plan of care and goals.     Anticipated barriers to physical therapy: none    Goals:   Short Term Goals (4 weeks)  1. Patient will be independent with home exercise program to supplement physical therapy treatment in improving functional status. Progressing, not met  2. Pt will improve impaired lower extremity manual muscle tests to >/= 4-/5 to improve dynamic left knee support for closed chain tasks.Progressing, not met  3. Pt will improve left knee extension ROM to 0 deg to improve mobility for  functional tasks. Progressing, not met  4. Pt will perform 10  consecutive STS, without compensation, to demonstrate improved functional mobility and LE endurance. Progressing, not met     Long Term Goals (8 weeks)  1. Patient will perform reciprocal stair navigation for 1 flight of stairs with reports of < 2/10 pain. Progressing, not met  2. Pt will improve impaired lower extremity manual muscle tests to >/= 4/5 to improve dynamic left knee support for closed chain tasks. Progressing, not met  3. Patient will be independent with updated home exercise program to supplement physical therapy treatment in improving functional status Progressing, not met    PLAN     Emphasize quad control.    Fadumo Shen, PT

## 2022-09-12 ENCOUNTER — DOCUMENTATION ONLY (OUTPATIENT)
Dept: REHABILITATION | Facility: HOSPITAL | Age: 67
End: 2022-09-12
Payer: MEDICARE

## 2022-09-12 ENCOUNTER — HOSPITAL ENCOUNTER (OUTPATIENT)
Dept: RADIOLOGY | Facility: HOSPITAL | Age: 67
Discharge: HOME | End: 2022-09-12
Attending: NURSE PRACTITIONER
Payer: MEDICARE

## 2022-09-12 DIAGNOSIS — Z01.818 PRE-OP EXAMINATION: ICD-10-CM

## 2022-09-12 DIAGNOSIS — N18.9 CHRONIC KIDNEY DISEASE, UNSPECIFIED CKD STAGE: ICD-10-CM

## 2022-09-12 DIAGNOSIS — M63.869: ICD-10-CM

## 2022-09-12 DIAGNOSIS — Z11.59 ENCOUNTER FOR HEPATITIS C SCREENING TEST FOR LOW RISK PATIENT: ICD-10-CM

## 2022-09-12 DIAGNOSIS — M25.562 CHRONIC PAIN OF LEFT KNEE: ICD-10-CM

## 2022-09-12 DIAGNOSIS — M25.062 HEMARTHROSIS, LEFT KNEE: ICD-10-CM

## 2022-09-12 DIAGNOSIS — G89.29 CHRONIC PAIN OF LEFT KNEE: ICD-10-CM

## 2022-09-12 DIAGNOSIS — Z13.6 ENCOUNTER FOR LIPID SCREENING FOR CARDIOVASCULAR DISEASE: ICD-10-CM

## 2022-09-12 DIAGNOSIS — Z00.00 ENCOUNTER FOR MEDICAL EXAMINATION TO ESTABLISH CARE: ICD-10-CM

## 2022-09-12 DIAGNOSIS — E07.9 DISORDER OF THYROID, UNSPECIFIED: ICD-10-CM

## 2022-09-12 DIAGNOSIS — M17.12 PRIMARY OSTEOARTHRITIS OF LEFT KNEE: ICD-10-CM

## 2022-09-12 DIAGNOSIS — Z78.0 POSTMENOPAUSE: ICD-10-CM

## 2022-09-12 DIAGNOSIS — Z12.31 ENCOUNTER FOR SCREENING MAMMOGRAM FOR MALIGNANT NEOPLASM OF BREAST: ICD-10-CM

## 2022-09-12 DIAGNOSIS — Z13.220 ENCOUNTER FOR LIPID SCREENING FOR CARDIOVASCULAR DISEASE: ICD-10-CM

## 2022-09-12 DIAGNOSIS — Z13.1 SCREENING FOR DIABETES MELLITUS: ICD-10-CM

## 2022-09-12 PROCEDURE — 77063 MAMMO DIGITAL SCREENING BILAT WITH TOMO: ICD-10-PCS | Mod: 26,,, | Performed by: RADIOLOGY

## 2022-09-12 PROCEDURE — 77067 SCR MAMMO BI INCL CAD: CPT | Mod: 26,,, | Performed by: RADIOLOGY

## 2022-09-12 PROCEDURE — 77067 MAMMO DIGITAL SCREENING BILAT WITH TOMO: ICD-10-PCS | Mod: 26,,, | Performed by: RADIOLOGY

## 2022-09-12 PROCEDURE — 77063 BREAST TOMOSYNTHESIS BI: CPT | Mod: TC,PO

## 2022-09-12 PROCEDURE — 77063 BREAST TOMOSYNTHESIS BI: CPT | Mod: 26,,, | Performed by: RADIOLOGY

## 2022-09-12 PROCEDURE — 77067 SCR MAMMO BI INCL CAD: CPT | Mod: TC,PO

## 2022-09-12 NOTE — PROGRESS NOTES
Physical Therapy: No show/Cancellation of Visit  Date: 09/12/2022    Patient was a no show to today's PT appointment. Pt was called and confirmed attendance for her next appointment . Patient's next scheduled appointment is 9/13/2022.     Initial Evaluation: 8/16/2022  Plan of Care Explanation: 10/14/2022  Cancel: 2  No show: 2    Therapist: Sofia Dai PTA

## 2022-09-13 ENCOUNTER — DOCUMENTATION ONLY (OUTPATIENT)
Dept: REHABILITATION | Facility: HOSPITAL | Age: 67
End: 2022-09-13
Payer: MEDICARE

## 2022-09-13 NOTE — PROGRESS NOTES
9/13/2022    Physical Therapy: No show  Date: 09/13/2022    Patient was a no show to today's PT appointment.  Initial Evaluation: 8/16/2022  Plan of Care Explanation: 10/14/2022  Cancel: 2  No show: 3    Patient's future appointments will be cancelled per Cancel/No show policy.     Therapist: Fadumo Shen PT

## 2022-10-11 ENCOUNTER — DOCUMENTATION ONLY (OUTPATIENT)
Dept: REHABILITATION | Facility: HOSPITAL | Age: 67
End: 2022-10-11
Payer: MEDICARE

## 2022-10-11 PROBLEM — Z74.09 IMPAIRED FUNCTIONAL MOBILITY, BALANCE, GAIT, AND ENDURANCE: Status: RESOLVED | Noted: 2022-08-19 | Resolved: 2022-10-11

## 2022-10-11 PROBLEM — M25.662 DECREASED RANGE OF MOTION (ROM) OF LEFT KNEE: Status: RESOLVED | Noted: 2022-08-19 | Resolved: 2022-10-11

## 2022-10-11 PROBLEM — R29.898 DECREASED STRENGTH OF LOWER EXTREMITY: Status: RESOLVED | Noted: 2022-08-19 | Resolved: 2022-10-11

## 2022-10-11 NOTE — PROGRESS NOTES
[facility] OUTPATIENT THERAPY AND WELLNESS  Physical Therapy Discharge Note    Name: Celina Azevedo  Clinic Number: [id number]    Therapy Diagnosis:        Encounter Diagnoses   Name Primary?    Impaired functional mobility, balance, gait, and endurance Yes    Decreased range of motion (ROM) of left knee      Decreased strength of lower extremity        Physician: Nolvia Hernandez MD        Physician Orders: PT Eval and Treat   Medical Diagnosis from Referral: M23.201 (ICD-10-CM) - Old tear of lateral meniscus of left knee, unspecified tear type  Evaluation Date: 8/16/2022    Date of Last visit: 8/26/2022  Total Visits Received: 18    ASSESSMENT      Discharge reason: Patient has not attended therapy since 8/26/2022.    Goals: UNMET  Short Term Goals (4 weeks)  1. Patient will be independent with home exercise program to supplement physical therapy treatment in improving functional status. Progressing, not met  2. Pt will improve impaired lower extremity manual muscle tests to >/= 4-/5 to improve dynamic left knee support for closed chain tasks.Progressing, not met  3. Pt will improve left knee extension ROM to 0 deg to improve mobility for functional tasks. Progressing, not met  4. Pt will perform 10  consecutive STS, without compensation, to demonstrate improved functional mobility and LE endurance. Progressing, not met     Long Term Goals (8 weeks)  1. Patient will perform reciprocal stair navigation for 1 flight of stairs with reports of < 2/10 pain. Progressing, not met  2. Pt will improve impaired lower extremity manual muscle tests to >/= 4/5 to improve dynamic left knee support for closed chain tasks. Progressing, not met  3. Patient will be independent with updated home exercise program to supplement physical therapy treatment in improving functional status Progressing, not met    PLAN   This patient is discharged from Physical Therapy      Fadumo Shen, PT

## 2022-12-31 ENCOUNTER — NURSE TRIAGE (OUTPATIENT)
Dept: ADMINISTRATIVE | Facility: CLINIC | Age: 67
End: 2022-12-31
Payer: MEDICARE

## 2022-12-31 ENCOUNTER — HOSPITAL ENCOUNTER (EMERGENCY)
Facility: HOSPITAL | Age: 67
Discharge: HOME | End: 2022-12-31
Attending: EMERGENCY MEDICINE
Payer: MEDICARE

## 2022-12-31 VITALS
DIASTOLIC BLOOD PRESSURE: 73 MMHG | SYSTOLIC BLOOD PRESSURE: 153 MMHG | TEMPERATURE: 98 F | BODY MASS INDEX: 29.59 KG/M2 | HEART RATE: 56 BPM | OXYGEN SATURATION: 95 % | HEIGHT: 63 IN | RESPIRATION RATE: 16 BRPM | WEIGHT: 167 LBS

## 2022-12-31 DIAGNOSIS — R00.2 PALPITATIONS: ICD-10-CM

## 2022-12-31 DIAGNOSIS — R07.89 CHEST DISCOMFORT: ICD-10-CM

## 2022-12-31 LAB
ALBUMIN SERPL BCP-MCNC: 3.5 G/DL (ref 3.5–5.2)
ALP SERPL-CCNC: 112 U/L (ref 55–135)
ALT SERPL W/O P-5'-P-CCNC: 21 U/L (ref 10–44)
ANION GAP SERPL CALC-SCNC: 7 MMOL/L (ref 8–16)
AST SERPL-CCNC: 24 U/L (ref 10–40)
BASOPHILS # BLD AUTO: 0.01 K/UL (ref 0–0.2)
BASOPHILS NFR BLD: 0.3 % (ref 0–1.9)
BILIRUB SERPL-MCNC: 0.2 MG/DL (ref 0.1–1)
BILIRUB UR QL STRIP: NEGATIVE
BNP SERPL-MCNC: 22 PG/ML (ref 0–99)
BUN SERPL-MCNC: 13 MG/DL (ref 8–23)
CALCIUM SERPL-MCNC: 9.4 MG/DL (ref 8.7–10.5)
CHLORIDE SERPL-SCNC: 101 MMOL/L (ref 95–110)
CLARITY UR REFRACT.AUTO: CLEAR
CO2 SERPL-SCNC: 31 MMOL/L (ref 23–29)
COLOR UR AUTO: YELLOW
CREAT SERPL-MCNC: 0.8 MG/DL (ref 0.5–1.4)
DIFFERENTIAL METHOD: ABNORMAL
EOSINOPHIL # BLD AUTO: 0 K/UL (ref 0–0.5)
EOSINOPHIL NFR BLD: 0.6 % (ref 0–8)
ERYTHROCYTE [DISTWIDTH] IN BLOOD BY AUTOMATED COUNT: 13.8 % (ref 11.5–14.5)
EST. GFR  (NO RACE VARIABLE): >60 ML/MIN/1.73 M^2
GLUCOSE SERPL-MCNC: 114 MG/DL (ref 70–110)
GLUCOSE UR QL STRIP: NEGATIVE
HCT VFR BLD AUTO: 39 % (ref 37–48.5)
HCV AB SERPL QL IA: NORMAL
HGB BLD-MCNC: 11.9 G/DL (ref 12–16)
HGB UR QL STRIP: NEGATIVE
HIV 1+2 AB+HIV1 P24 AG SERPL QL IA: NORMAL
IMM GRANULOCYTES # BLD AUTO: 0.01 K/UL (ref 0–0.04)
IMM GRANULOCYTES NFR BLD AUTO: 0.3 % (ref 0–0.5)
KETONES UR QL STRIP: NEGATIVE
LEUKOCYTE ESTERASE UR QL STRIP: NEGATIVE
LYMPHOCYTES # BLD AUTO: 1 K/UL (ref 1–4.8)
LYMPHOCYTES NFR BLD: 32.3 % (ref 18–48)
MAGNESIUM SERPL-MCNC: 2 MG/DL (ref 1.6–2.6)
MCH RBC QN AUTO: 28.3 PG (ref 27–31)
MCHC RBC AUTO-ENTMCNC: 30.5 G/DL (ref 32–36)
MCV RBC AUTO: 93 FL (ref 82–98)
MONOCYTES # BLD AUTO: 0.5 K/UL (ref 0.3–1)
MONOCYTES NFR BLD: 14.3 % (ref 4–15)
NEUTROPHILS # BLD AUTO: 1.7 K/UL (ref 1.8–7.7)
NEUTROPHILS NFR BLD: 52.2 % (ref 38–73)
NITRITE UR QL STRIP: NEGATIVE
NRBC BLD-RTO: 0 /100 WBC
PH UR STRIP: 7 [PH] (ref 5–8)
PLATELET # BLD AUTO: 181 K/UL (ref 150–450)
PMV BLD AUTO: 11.4 FL (ref 9.2–12.9)
POTASSIUM SERPL-SCNC: 4.3 MMOL/L (ref 3.5–5.1)
PROT SERPL-MCNC: 6.9 G/DL (ref 6–8.4)
PROT UR QL STRIP: NEGATIVE
RBC # BLD AUTO: 4.21 M/UL (ref 4–5.4)
SODIUM SERPL-SCNC: 139 MMOL/L (ref 136–145)
SP GR UR STRIP: 1.01 (ref 1–1.03)
TROPONIN I SERPL DL<=0.01 NG/ML-MCNC: <0.006 NG/ML (ref 0–0.03)
TROPONIN I SERPL DL<=0.01 NG/ML-MCNC: <0.006 NG/ML (ref 0–0.03)
TSH SERPL DL<=0.005 MIU/L-ACNC: 1.92 UIU/ML (ref 0.4–4)
URN SPEC COLLECT METH UR: NORMAL
WBC # BLD AUTO: 3.22 K/UL (ref 3.9–12.7)

## 2022-12-31 PROCEDURE — 85025 COMPLETE CBC W/AUTO DIFF WBC: CPT | Mod: HCNC | Performed by: PHYSICIAN ASSISTANT

## 2022-12-31 PROCEDURE — 83735 ASSAY OF MAGNESIUM: CPT | Mod: HCNC | Performed by: PHYSICIAN ASSISTANT

## 2022-12-31 PROCEDURE — 99285 PR EMERGENCY DEPT VISIT,LEVEL V: ICD-10-PCS | Mod: ,,, | Performed by: PHYSICIAN ASSISTANT

## 2022-12-31 PROCEDURE — 86803 HEPATITIS C AB TEST: CPT | Mod: HCNC | Performed by: PHYSICIAN ASSISTANT

## 2022-12-31 PROCEDURE — 83880 ASSAY OF NATRIURETIC PEPTIDE: CPT | Mod: HCNC | Performed by: PHYSICIAN ASSISTANT

## 2022-12-31 PROCEDURE — 84443 ASSAY THYROID STIM HORMONE: CPT | Mod: HCNC | Performed by: PHYSICIAN ASSISTANT

## 2022-12-31 PROCEDURE — 93010 EKG 12-LEAD: ICD-10-PCS | Mod: HCNC,,, | Performed by: INTERNAL MEDICINE

## 2022-12-31 PROCEDURE — 87389 HIV-1 AG W/HIV-1&-2 AB AG IA: CPT | Mod: HCNC | Performed by: PHYSICIAN ASSISTANT

## 2022-12-31 PROCEDURE — 81003 URINALYSIS AUTO W/O SCOPE: CPT | Mod: HCNC | Performed by: PHYSICIAN ASSISTANT

## 2022-12-31 PROCEDURE — 93010 ELECTROCARDIOGRAM REPORT: CPT | Mod: HCNC,,, | Performed by: INTERNAL MEDICINE

## 2022-12-31 PROCEDURE — 25000003 PHARM REV CODE 250: Mod: HCNC | Performed by: PHYSICIAN ASSISTANT

## 2022-12-31 PROCEDURE — 99285 EMERGENCY DEPT VISIT HI MDM: CPT | Mod: ,,, | Performed by: PHYSICIAN ASSISTANT

## 2022-12-31 PROCEDURE — 80053 COMPREHEN METABOLIC PANEL: CPT | Mod: HCNC | Performed by: PHYSICIAN ASSISTANT

## 2022-12-31 PROCEDURE — 84484 ASSAY OF TROPONIN QUANT: CPT | Mod: HCNC | Performed by: PHYSICIAN ASSISTANT

## 2022-12-31 PROCEDURE — 99285 EMERGENCY DEPT VISIT HI MDM: CPT | Mod: 25,HCNC

## 2022-12-31 PROCEDURE — 93005 ELECTROCARDIOGRAM TRACING: CPT | Mod: HCNC

## 2022-12-31 RX ORDER — ASPIRIN 325 MG
325 TABLET ORAL
Status: COMPLETED | OUTPATIENT
Start: 2022-12-31 | End: 2022-12-31

## 2022-12-31 RX ORDER — ACETAMINOPHEN 500 MG
1000 TABLET ORAL
Status: COMPLETED | OUTPATIENT
Start: 2022-12-31 | End: 2022-12-31

## 2022-12-31 RX ORDER — LIDOCAINE 50 MG/G
1 PATCH TOPICAL
Status: DISCONTINUED | OUTPATIENT
Start: 2022-12-31 | End: 2022-12-31 | Stop reason: HOSPADM

## 2022-12-31 RX ADMIN — LIDOCAINE 1 PATCH: 50 PATCH CUTANEOUS at 04:12

## 2022-12-31 RX ADMIN — ASPIRIN 325 MG ORAL TABLET 325 MG: 325 PILL ORAL at 12:12

## 2022-12-31 RX ADMIN — ACETAMINOPHEN 1000 MG: 500 TABLET ORAL at 04:12

## 2022-12-31 NOTE — DISCHARGE INSTRUCTIONS
1. La prueba de enzimas cardíacas de ustedes dos fue negativa. Germain radiografía de tórax es negativa. Seguimiento con Cardiología y Atención Primaria.  2. Puede judy 650 mg de acetaminofeno/tylenol cada 6 horas o 1000 mg cada 8 horas para un alivio adicional.  3. Aplicar hielo en la humberto harry 10-20 minutos cada 4 horas. Puedes aplicar calor 2 días después por la misma duración y frecuencia.  4. Regrese a la daniel de emergencias por síntomas nuevos o que empeoran.  You two cardiac enzyme test were negative. Your chest x-ray is negative. Follow up with Cardiology and Primary care.   You can take acetaminophen/tylenol 650 mg every 6 hours or 1000 mg every 8 hours for added relief.  Apply ice to the area for 10-20 minutes every 4 hours. You can apply heat 2 days after for the same duration and frequency.  Return to the ER for new or worsening symptoms.  Future Appointments   Date Time Provider Department Center   2/6/2023  2:45 PM [facility]

## 2022-12-31 NOTE — TELEPHONE ENCOUNTER
Hermes calling for friend Celina who states she is c/o intermittent chest pain and upper back pain, palpitations and noise in her chest. +cough Denies SOB.  Khushbu  id [id number]. Advised per triage protocol to go to nearest ED now for physician chidi. V/u.   Reason for Disposition   SEVERE chest pain    Additional Information   Negative: SEVERE difficulty breathing (e.g., struggling for each breath, speaks in single words)   Negative: Difficult to awaken or acting confused (e.g., disoriented, slurred speech)   Negative: Shock suspected (e.g., cold/pale/clammy skin, too weak to stand, low BP, rapid pulse)   Negative: Passed out (i.e., lost consciousness, collapsed and was not responding)   Negative: [1] Chest pain lasts > 5 minutes AND [2] age > 44   Negative: [1] Chest pain lasts > 5 minutes AND [2] age > 30 AND [3] one or more cardiac risk factors (e.g., diabetes, high blood pressure, high cholesterol, smoker, or strong family history of heart disease)   Negative: [1] Chest pain lasts > 5 minutes AND [2] history of heart disease (i.e., angina, heart attack, heart failure, bypass surgery, takes nitroglycerin)   Negative: [1] Chest pain lasts > 5 minutes AND [2] described as crushing, pressure-like, or heavy   Negative: Heart beating < 50 beats per minute OR > 140 beats per minute   Negative: Visible sweat on face or sweat dripping down face   Negative: Sounds like a life-threatening emergency to the triager   Negative: Followed a chest injury    Protocols used: Chest Pain-A-AH

## 2022-12-31 NOTE — ED PROVIDER NOTES
Encounter Date: 12/31/2022       History     Chief Complaint   Patient presents with    Chest Pain     67-year-old  female with past medical history of hyperlipidemia presents emergency department for palpitations and chest pain.  States last night around 11:00 p.m. began having rapid heart rate which lasted all night during that time she had substernal chest pressure that was mild as well as mid back pain that she rates it 3/10.  Unable to describe it to me however denies sharp, tearing pain states it was more of a discomfort.  Denies any nausea vomiting, fevers/chills, runny nose, sore throat, abdominal pain or urinary symptoms.  States when she woke up this morning she no longer has chest pain but occasionally feels a skipped beat feeling.    The history is provided by the patient.   Review of patient's allergies indicates:  No Known Allergies  Past Medical History:   Diagnosis Date    Hyperlipidemia      Past Surgical History:   Procedure Laterality Date    ARTHROSCOPIC CHONDROPLASTY OF KNEE JOINT Left 4/1/2022    Procedure: ARTHROSCOPY, KNEE, WITH CHONDROPLASTY;  Surgeon: JEANINE Fuentes MD;  Location: [facility] OR;  Service: Orthopedics;  Laterality: Left;    HYSTERECTOMY      KNEE ARTHROSCOPY W/ MENISCECTOMY Left 4/1/2022    Procedure: ARTHROSCOPY, KNEE, WITH MENISCECTOMY partial lateral;  Surgeon: JEANINE Fuentes MD;  Location: [facility] OR;  Service: Orthopedics;  Laterality: Left;  regional with catheter- adductor  pericapsular injection: 30cc MILTON     History reviewed. No pertinent family history.  Social History     Tobacco Use    Smoking status: Never    Smokeless tobacco: Never   Substance Use Topics    Alcohol use: No    Drug use: No     Review of Systems   Constitutional:  Negative for chills and fever.   HENT:  Negative for sore throat.    Cardiovascular:  Positive for chest pain and palpitations.   Gastrointestinal:  Negative for abdominal pain, nausea and vomiting.   Genitourinary:   Negative for difficulty urinating and dysuria.   Musculoskeletal: Negative.    Skin: Negative.    Neurological:  Negative for weakness.   Psychiatric/Behavioral:  Negative for confusion.      Physical Exam     Initial Vitals [12/31/22 1118]   BP Pulse Resp Temp SpO2   134/70 77 18 98 °F (36.7 °C) 98 %      MAP       --         Physical Exam    Nursing note and vitals reviewed.  Constitutional: She appears well-developed and well-nourished. She is not diaphoretic. No distress.   HENT:   Head: Normocephalic and atraumatic.   Eyes: Conjunctivae are normal. Pupils are equal, round, and reactive to light.   Neck:   Normal range of motion.  Cardiovascular:  Normal rate, regular rhythm, normal heart sounds and intact distal pulses.           Pulmonary/Chest: Breath sounds normal. No respiratory distress. She has no wheezes. She has no rhonchi. She has no rales. She exhibits no tenderness.   Abdominal: Abdomen is soft. Bowel sounds are normal. There is no abdominal tenderness.   Musculoskeletal:         General: Normal range of motion.      Cervical back: Normal range of motion.     Neurological: She is alert and oriented to person, place, and time. She has normal strength. No cranial nerve deficit. GCS score is 15. GCS eye subscore is 4. GCS verbal subscore is 5. GCS motor subscore is 6.   Skin: Skin is warm and dry. Capillary refill takes less than 2 seconds.   Psychiatric: She has a normal mood and affect.       ED Course   Procedures  Labs Reviewed   CBC W/ AUTO DIFFERENTIAL - Abnormal; Notable for the following components:       Result Value    WBC 3.22 (*)     Hemoglobin 11.9 (*)     MCHC 30.5 (*)     Gran # (ANC) 1.7 (*)     All other components within normal limits   COMPREHENSIVE METABOLIC PANEL - Abnormal; Notable for the following components:    CO2 31 (*)     Glucose 114 (*)     Anion Gap 7 (*)     All other components within normal limits   HIV 1 / 2 ANTIBODY    Narrative:     Release to patient->Immediate    HEPATITIS C ANTIBODY    Narrative:     Release to patient->Immediate   TROPONIN I   B-TYPE NATRIURETIC PEPTIDE   URINALYSIS, REFLEX TO URINE CULTURE    Narrative:     Specimen Source->Urine   MAGNESIUM   TSH   TROPONIN I          Imaging Results              X-Ray Chest PA And Lateral (Final result)  Result time 12/31/22 13:26:31      Final result by Piedad Cardoso MD (12/31/22 13:26:31)                   Impression:      No acute abnormality.      Electronically signed by: Piedad Cardoso MD  Date:    12/31/2022  Time:    13:26               Narrative:    EXAMINATION:  XR CHEST PA AND LATERAL    CLINICAL HISTORY:  Palpitations    TECHNIQUE:  PA and lateral views of the chest were performed.    COMPARISON:  March 2022    FINDINGS:  Lungs are slightly underinflated.The lungs are clear without consolidation.  no pleural effusion or pneumothorax.    The cardiac silhouette is normal in size. The hilar and mediastinal contours are unremarkable.    Bones are intact.                                       Medications   LIDOcaine 5 % patch 1 patch (1 patch Transdermal Patch Applied 12/31/22 1624)   aspirin tablet 325 mg (325 mg Oral Given 12/31/22 1231)   acetaminophen tablet 1,000 mg (1,000 mg Oral Given 12/31/22 1622)     Medical Decision Making:   History:   Old Medical Records: I decided to obtain old medical records.  Initial Assessment:   67-year-old  female who presents to the ED for palpitations and chest and back discomfort.  Reports palpitations that started at 11:00 p.m. last night.  Her pain and discomfort have resolved today but continues to have palpitations.  Denies any shortness of breath.  Differential Diagnosis:   ACS, pneumonia, palpitations, electrolyte derangement, thyrotoxicosis, dehydration  Independently Interpreted Test(s):   I have ordered and independently interpreted EKG Reading(s) - see summary below       <> Summary of EKG Reading(s): On my individual interpretation normal sinus  rhythm at a rate of 70.  Normal axis, normal intervals, no STEMI.  Clinical Tests:   Lab Tests: Ordered and Reviewed  Radiological Study: Ordered and Reviewed  Medical Tests: Ordered and Reviewed  ED Management:  Nonischemic EKG and troponin negative.  Will obtain to malgorzata.  TSH normal.  UA negative for infection.  No leukocytosis or electrolyte derangement on labs.  Of note nurse obtained an ambulatory sat she was noted to be hypoxic 88.  However did not have a good plateau on the Dynamap and do not feel that this is an accurate ambulatory sat.  Will repeat ambulatory sat on a monitor.  If her 2nd troponin is normal suspect she would be stable for discharge home as she has had no chest pain, shortness of breath or palpitations while being monitored in the ED and will place an outpatient referral for Cardiology.  If her repeat ambulatory sat is low however may need additional workup.  NESSA Harris has assumed care and will follow-up on labs and reassessment.           ED Course as of 01/02/23 2215   Sat Dec 31, 2022   1438 Troponin I: <0.006 [HJ]   1511 Comprehensive metabolic panel(!)  No significant electrolyte derangement or YASMEEN [HJ]   1511 Magnesium: 2.0 [HJ]   1512 TSH: 1.916 [HJ]   1641 Troponin I: <0.006 [CL]      ED Course User Index  [CL] Peri Harris PA-C  [HJ] Clover Jordan PA-C                 Clinical Impression:   Final diagnoses:  [R07.89] Chest discomfort  [R00.2] Palpitations        ED Disposition Condition    Discharge Stable          ED Prescriptions    None       Follow-up Information       Follow up With Specialties Details Why Contact Info Additional Information    Sam Jones - Cardiology - [facility] Cardiology Schedule an appointment as soon as possible for a visit   [address]  [facility] [address]  [phone] Cardiology Services Clinics - 3rd floor             Clover Jordan PA-C  12/31/22 1628       Clover Jordan PA-C  01/02/23 2216

## 2022-12-31 NOTE — ED NOTES
"Patient states palpitations onset 3 days ago, reports hearing " rumbling" in chest , Denies CP or SOB, Deneis fevers  "

## 2022-12-31 NOTE — PROVIDER PROGRESS NOTES - EMERGENCY DEPT.
Encounter Date: 12/31/2022    ED Physician Progress Notes        Physician Note:   ED Physician Hand-off Note:    ED Course: I assumed care of patient from off-going ED primary team. Briefly, patient is a 67 year old Vietnamese speaking female with a history of HLD who presents to [facility] ED with chest pain and palpitations onset at 11 PM. 1st trop neg. CXR clear.     At the time of signout, the plan was pending vitals, 2nd trop, and clinical improvement.     Medications given in the ED:    Medications  aspirin tablet 325 mg (325 mg Oral Given 12/31/22 1231)    ED course:  5:09 PM  Patient without signs of hypoxia at rest or with ambulation. Likely previous readings erroneous.   2nd trop flat.   Patient reassesses. She was updated with results using [facility] interpretation services. No acute findings. Neg TSH, CMP, HIV, hep C, CXR and 2 trops flat. Could be GERD vs MSK pain. She is asymptomatic still.   Follow up with Cardiology. Referral was placed.   All questions answered. ED return precautions given.   Patient comfortable with plan and stable for discharge.     Disposition: discharge    Impression:   Chest discomfort  Palpitations    5:11 PM  Peri Harris PA-C  Emergent Department  [facility]  Spectralink #[id number] or #[id number]

## 2022-12-31 NOTE — ED NOTES
Patient identifiers verified and correct for  [facility]  C/C:   Palpitations  SEE NN  APPEARANCE: awake and alert in NAD. PAIN  */10  SKIN: warm, dry and intact. No breakdown or bruising.  MUSCULOSKELETAL: Patient moving all extremities spontaneously, no obvious swelling or deformities noted. Ambulates independently.  RESPIRATORY: Denies shortness of breath.Respirations unlabored. Deneis cough Denies fevers  CARDIAC: Denies CP, 2+ distal pulses; no peripheral edema  ABDOMEN: S/ND/NT, Denies nausea  : voids spontaneously, denies difficulty  Neurologic: AAO x 4; follows commands equal strength in all extremities; denies numbness/tingling. Denies dizziness  Eloy new weakness

## 2023-01-11 DIAGNOSIS — E78.5 HYPERLIPIDEMIA, UNSPECIFIED HYPERLIPIDEMIA TYPE: Primary | ICD-10-CM

## 2023-01-11 DIAGNOSIS — R79.9 ABNORMAL FINDING OF BLOOD CHEMISTRY, UNSPECIFIED: ICD-10-CM

## 2023-01-13 ENCOUNTER — OFFICE VISIT (OUTPATIENT)
Dept: INTERNAL MEDICINE | Facility: CLINIC | Age: 68
End: 2023-01-13
Payer: MEDICARE

## 2023-01-13 VITALS
BODY MASS INDEX: 27.49 KG/M2 | SYSTOLIC BLOOD PRESSURE: 121 MMHG | HEIGHT: 65 IN | HEART RATE: 74 BPM | WEIGHT: 165 LBS | DIASTOLIC BLOOD PRESSURE: 68 MMHG

## 2023-01-13 DIAGNOSIS — D64.9 NORMOCYTIC ANEMIA: Primary | ICD-10-CM

## 2023-01-13 DIAGNOSIS — R79.9 ABNORMAL FINDING OF BLOOD CHEMISTRY, UNSPECIFIED: ICD-10-CM

## 2023-01-13 DIAGNOSIS — R73.03 PREDIABETES: ICD-10-CM

## 2023-01-13 DIAGNOSIS — Z00.00 HEALTHCARE MAINTENANCE: ICD-10-CM

## 2023-01-13 DIAGNOSIS — Z86.018 HISTORY OF CHANGING SKIN MOLE: ICD-10-CM

## 2023-01-13 DIAGNOSIS — Z00.00 ROUTINE GENERAL MEDICAL EXAMINATION AT A HEALTH CARE FACILITY: ICD-10-CM

## 2023-01-13 PROCEDURE — 3288F PR FALLS RISK ASSESSMENT DOCUMENTED: ICD-10-PCS | Mod: HCNC,CPTII,S$GLB, | Performed by: STUDENT IN AN ORGANIZED HEALTH CARE EDUCATION/TRAINING PROGRAM

## 2023-01-13 PROCEDURE — 3008F PR BODY MASS INDEX (BMI) DOCUMENTED: ICD-10-PCS | Mod: HCNC,CPTII,S$GLB, | Performed by: STUDENT IN AN ORGANIZED HEALTH CARE EDUCATION/TRAINING PROGRAM

## 2023-01-13 PROCEDURE — 1160F RVW MEDS BY RX/DR IN RCRD: CPT | Mod: HCNC,CPTII,S$GLB, | Performed by: STUDENT IN AN ORGANIZED HEALTH CARE EDUCATION/TRAINING PROGRAM

## 2023-01-13 PROCEDURE — 99397 PER PM REEVAL EST PAT 65+ YR: CPT | Mod: HCNC,S$GLB,, | Performed by: STUDENT IN AN ORGANIZED HEALTH CARE EDUCATION/TRAINING PROGRAM

## 2023-01-13 PROCEDURE — 1101F PT FALLS ASSESS-DOCD LE1/YR: CPT | Mod: HCNC,CPTII,S$GLB, | Performed by: STUDENT IN AN ORGANIZED HEALTH CARE EDUCATION/TRAINING PROGRAM

## 2023-01-13 PROCEDURE — 99999 PR PBB SHADOW E&M-EST. PATIENT-LVL IV: ICD-10-PCS | Mod: PBBFAC,HCNC,, | Performed by: STUDENT IN AN ORGANIZED HEALTH CARE EDUCATION/TRAINING PROGRAM

## 2023-01-13 PROCEDURE — 3074F SYST BP LT 130 MM HG: CPT | Mod: HCNC,CPTII,S$GLB, | Performed by: STUDENT IN AN ORGANIZED HEALTH CARE EDUCATION/TRAINING PROGRAM

## 2023-01-13 PROCEDURE — 1101F PR PT FALLS ASSESS DOC 0-1 FALLS W/OUT INJ PAST YR: ICD-10-PCS | Mod: HCNC,CPTII,S$GLB, | Performed by: STUDENT IN AN ORGANIZED HEALTH CARE EDUCATION/TRAINING PROGRAM

## 2023-01-13 PROCEDURE — 3288F FALL RISK ASSESSMENT DOCD: CPT | Mod: HCNC,CPTII,S$GLB, | Performed by: STUDENT IN AN ORGANIZED HEALTH CARE EDUCATION/TRAINING PROGRAM

## 2023-01-13 PROCEDURE — 1160F PR REVIEW ALL MEDS BY PRESCRIBER/CLIN PHARMACIST DOCUMENTED: ICD-10-PCS | Mod: HCNC,CPTII,S$GLB, | Performed by: STUDENT IN AN ORGANIZED HEALTH CARE EDUCATION/TRAINING PROGRAM

## 2023-01-13 PROCEDURE — 1126F PR PAIN SEVERITY QUANTIFIED, NO PAIN PRESENT: ICD-10-PCS | Mod: HCNC,CPTII,S$GLB, | Performed by: STUDENT IN AN ORGANIZED HEALTH CARE EDUCATION/TRAINING PROGRAM

## 2023-01-13 PROCEDURE — 99397 PR PREVENTIVE VISIT,EST,65 & OVER: ICD-10-PCS | Mod: HCNC,S$GLB,, | Performed by: STUDENT IN AN ORGANIZED HEALTH CARE EDUCATION/TRAINING PROGRAM

## 2023-01-13 PROCEDURE — 3078F PR MOST RECENT DIASTOLIC BLOOD PRESSURE < 80 MM HG: ICD-10-PCS | Mod: HCNC,CPTII,S$GLB, | Performed by: STUDENT IN AN ORGANIZED HEALTH CARE EDUCATION/TRAINING PROGRAM

## 2023-01-13 PROCEDURE — 3078F DIAST BP <80 MM HG: CPT | Mod: HCNC,CPTII,S$GLB, | Performed by: STUDENT IN AN ORGANIZED HEALTH CARE EDUCATION/TRAINING PROGRAM

## 2023-01-13 PROCEDURE — 1159F MED LIST DOCD IN RCRD: CPT | Mod: HCNC,CPTII,S$GLB, | Performed by: STUDENT IN AN ORGANIZED HEALTH CARE EDUCATION/TRAINING PROGRAM

## 2023-01-13 PROCEDURE — 99999 PR PBB SHADOW E&M-EST. PATIENT-LVL IV: CPT | Mod: PBBFAC,HCNC,, | Performed by: STUDENT IN AN ORGANIZED HEALTH CARE EDUCATION/TRAINING PROGRAM

## 2023-01-13 PROCEDURE — 1159F PR MEDICATION LIST DOCUMENTED IN MEDICAL RECORD: ICD-10-PCS | Mod: HCNC,CPTII,S$GLB, | Performed by: STUDENT IN AN ORGANIZED HEALTH CARE EDUCATION/TRAINING PROGRAM

## 2023-01-13 PROCEDURE — 3008F BODY MASS INDEX DOCD: CPT | Mod: HCNC,CPTII,S$GLB, | Performed by: STUDENT IN AN ORGANIZED HEALTH CARE EDUCATION/TRAINING PROGRAM

## 2023-01-13 PROCEDURE — 3074F PR MOST RECENT SYSTOLIC BLOOD PRESSURE < 130 MM HG: ICD-10-PCS | Mod: HCNC,CPTII,S$GLB, | Performed by: STUDENT IN AN ORGANIZED HEALTH CARE EDUCATION/TRAINING PROGRAM

## 2023-01-13 PROCEDURE — 1126F AMNT PAIN NOTED NONE PRSNT: CPT | Mod: HCNC,CPTII,S$GLB, | Performed by: STUDENT IN AN ORGANIZED HEALTH CARE EDUCATION/TRAINING PROGRAM

## 2023-01-13 NOTE — PROGRESS NOTES
Subjective:       Patient ID: Celina Azevedo is a 67 y.o. female.    Chief Complaint: Follow-up (But wants to establish care with you)    HPI    Patient is a 67 y.o. female , [place] speaking, with a history of:  Menopause  OA knee  Osteopenia  S/P left knee arthroscopy  Prediabetes    That comes to the clinic to establish care and s/p ED discharge.    Patient is currently asymptomatic and has no complaints.  Patient denies CV symptoms, CP, SOB, palpitations.  Patient denies constitutional symptoms, fever, changes in the urine or stool.    Patient was see in the ER on 12/31/22 for chest pain and palpitations. ECG and troponins were negative. She was discharged home on ASA and statin, and has an outpatient f/u appointment with cardiology.    Since discharge patient denies CP, SOB, palpitations, leg edema or other CV symptoms. As per her complaints during her ER visit she said this was the only time it had happened to her and she doesn't have CP regularly.    Latest labs 12/2022  CBC Hb 11.9  CMP Hyperglycemia Glc 114  Tsh 1.916  Hep C non/reactive  HVI negative  A1c (3/8/22): 5.9    PMH: HLD, prediabetes, osteopenia  PSH: Left knee surgery  FH: Negative  Allergies: Negative  Meds: Atorvastatin 20 gm qhs, Calcium    Social:  , has 1 grown child.  Lives with her .    Exercise: Walks daily  Diet: Regular with no restrictions  Tobacco: Denies  Alcohol: Denies  Recreational drug use: Denies  Recent travel: Denies    Healthcare Maintenance:  Colonoscopy:  Vaccinations: Pneumonia, Zoster, Tetanus  COVID vaccination: completed  Depression screening: PHQ2 score = 0    ROS  11-point review of systems done. Negative except for detailed in the HPI.        Objective:      Physical Exam  Vitals and nursing note reviewed.   Constitutional:       Appearance: Normal appearance.   HENT:      Head: Normocephalic and atraumatic.      Right Ear: Tympanic membrane normal.      Left Ear: Tympanic membrane normal.      Nose:  Nose normal.      Mouth/Throat:      Mouth: Mucous membranes are moist.      Pharynx: Oropharynx is clear.   Eyes:      Extraocular Movements: Extraocular movements intact.      Conjunctiva/sclera: Conjunctivae normal.      Pupils: Pupils are equal, round, and reactive to light.   Cardiovascular:      Rate and Rhythm: Normal rate and regular rhythm.      Pulses: Normal pulses.      Heart sounds: Normal heart sounds.   Pulmonary:      Effort: Pulmonary effort is normal.      Breath sounds: Normal breath sounds.   Abdominal:      General: Bowel sounds are normal. There is no distension.      Palpations: Abdomen is soft.      Tenderness: There is no abdominal tenderness.   Musculoskeletal:         General: Normal range of motion.      Cervical back: Normal range of motion and neck supple.      Comments: BL LE various veins   Skin:     General: Skin is warm.      Findings: Lesion present.      Comments: Left hand hyperpigmented macule, ovale 8 mm x 3 mm present over the dorsum of the hand   Neurological:      General: No focal deficit present.      Mental Status: She is alert and oriented to person, place, and time. Mental status is at baseline.   Psychiatric:         Mood and Affect: Mood normal.          Assessment:       Problem List Items Addressed This Visit          Oncology    History of changing skin mole     Hyperpigmented mole of the left hand, changing in color  Will refer to dermatology.         Relevant Orders    Ambulatory referral/consult to Dermatology    Normocytic anemia - Primary     As seen on labs from 12/2022  Unclear etiology  Asymptomatic  Will repeat CBC to r/o lab error         Relevant Orders    CBC Auto Differential       Endocrine    Prediabetes     A1C 5.9  Will manage with lifestyle modifications, diet, weight loss and physical activity.  Education provided.           Relevant Orders    CBC Auto Differential    Comprehensive Metabolic Panel    Hemoglobin A1C    Microalbumin/Creatinine  Ratio, Urine    Lipid Panel       Other    Routine general medical examination at a health care facility     Patient is in overall good general health.  Stable medical conditions listed on the PMH.  Labs reviewed and patient notified.           Healthcare maintenance     Health care maintenance and core gaps reviewed and assessed with patient.  Vaccinations recommended:        Tetanus - O       Shingles - D       Influenza - D       Pneumonia - O  Colon cancer screening:   Colonoscopy: O  Lifestyle recommendations given.  Physical activity recommended.    Legend: Ordered (O), Declined (D), Current (C)           Relevant Medications    diphth,pertus,acell,,tetanus (BOOSTRIX) 2.5-8-5 Lf-mcg-Lf/0.5mL Susp    Other Relevant Orders    Ambulatory referral/consult to Endo Procedure     Pneumococcal Polysaccharide Vaccine (23 Valent) (SQ/IM)     Other Visit Diagnoses       Abnormal finding of blood chemistry, unspecified        Relevant Orders    Lipid Panel            Plan:       - Colonoscopy ordered  - Vaccines ordered: Pneumonia, tetanus.  - Declined Flu  - Referral to dermatology  - Labs ordered      Education provided  Lifestyle recommendations given  AVS printed, explained, and given to the patient.  RTC in :  6 months for pre-diabetes f/u with labs, ordered  1 year for AWV with labs, not ordered yet      TRICIA BUNDY MD, MPH  Internal Medicine  [facility]  [facility]

## 2023-01-13 NOTE — ASSESSMENT & PLAN NOTE
Health care maintenance and core gaps reviewed and assessed with patient.  Vaccinations recommended:        Tetanus - O       Shingles - D       Influenza - D       Pneumonia - O  Colon cancer screening:   Colonoscopy: O  Lifestyle recommendations given.  Physical activity recommended.    Legend: Ordered (O), Declined (D), Current (C)

## 2023-01-13 NOTE — ASSESSMENT & PLAN NOTE
A1C 5.9  Will manage with lifestyle modifications, diet, weight loss and physical activity.  Education provided.

## 2023-01-17 ENCOUNTER — LAB VISIT (OUTPATIENT)
Dept: LAB | Facility: HOSPITAL | Age: 68
End: 2023-01-17
Attending: STUDENT IN AN ORGANIZED HEALTH CARE EDUCATION/TRAINING PROGRAM
Payer: MEDICARE

## 2023-01-17 DIAGNOSIS — R79.9 ABNORMAL FINDING OF BLOOD CHEMISTRY, UNSPECIFIED: ICD-10-CM

## 2023-01-17 DIAGNOSIS — D64.9 NORMOCYTIC ANEMIA: ICD-10-CM

## 2023-01-17 DIAGNOSIS — E78.5 HYPERLIPIDEMIA, UNSPECIFIED HYPERLIPIDEMIA TYPE: ICD-10-CM

## 2023-01-17 LAB
ALBUMIN SERPL BCP-MCNC: 3.7 G/DL (ref 3.5–5.2)
ALP SERPL-CCNC: 96 U/L (ref 55–135)
ALT SERPL W/O P-5'-P-CCNC: 37 U/L (ref 10–44)
ANION GAP SERPL CALC-SCNC: 9 MMOL/L (ref 8–16)
AST SERPL-CCNC: 30 U/L (ref 10–40)
BASOPHILS # BLD AUTO: 0.02 K/UL (ref 0–0.2)
BASOPHILS NFR BLD: 0.3 % (ref 0–1.9)
BILIRUB SERPL-MCNC: 0.5 MG/DL (ref 0.1–1)
BUN SERPL-MCNC: 20 MG/DL (ref 8–23)
CALCIUM SERPL-MCNC: 9.6 MG/DL (ref 8.7–10.5)
CHLORIDE SERPL-SCNC: 105 MMOL/L (ref 95–110)
CO2 SERPL-SCNC: 26 MMOL/L (ref 23–29)
CREAT SERPL-MCNC: 0.8 MG/DL (ref 0.5–1.4)
DIFFERENTIAL METHOD: ABNORMAL
EOSINOPHIL # BLD AUTO: 0.1 K/UL (ref 0–0.5)
EOSINOPHIL NFR BLD: 1.1 % (ref 0–8)
ERYTHROCYTE [DISTWIDTH] IN BLOOD BY AUTOMATED COUNT: 13.8 % (ref 11.5–14.5)
EST. GFR  (NO RACE VARIABLE): >60 ML/MIN/1.73 M^2
ESTIMATED AVG GLUCOSE: 123 MG/DL (ref 68–131)
GLUCOSE SERPL-MCNC: 93 MG/DL (ref 70–110)
HBA1C MFR BLD: 5.9 % (ref 4–5.6)
HCT VFR BLD AUTO: 38.6 % (ref 37–48.5)
HGB BLD-MCNC: 12.1 G/DL (ref 12–16)
IMM GRANULOCYTES # BLD AUTO: 0.02 K/UL (ref 0–0.04)
IMM GRANULOCYTES NFR BLD AUTO: 0.3 % (ref 0–0.5)
LYMPHOCYTES # BLD AUTO: 1.9 K/UL (ref 1–4.8)
LYMPHOCYTES NFR BLD: 28 % (ref 18–48)
MCH RBC QN AUTO: 28.7 PG (ref 27–31)
MCHC RBC AUTO-ENTMCNC: 31.3 G/DL (ref 32–36)
MCV RBC AUTO: 92 FL (ref 82–98)
MONOCYTES # BLD AUTO: 0.6 K/UL (ref 0.3–1)
MONOCYTES NFR BLD: 8.5 % (ref 4–15)
NEUTROPHILS # BLD AUTO: 4.1 K/UL (ref 1.8–7.7)
NEUTROPHILS NFR BLD: 61.8 % (ref 38–73)
NRBC BLD-RTO: 0 /100 WBC
PLATELET # BLD AUTO: 221 K/UL (ref 150–450)
PMV BLD AUTO: 11.2 FL (ref 9.2–12.9)
POTASSIUM SERPL-SCNC: 4.3 MMOL/L (ref 3.5–5.1)
PROT SERPL-MCNC: 7.1 G/DL (ref 6–8.4)
RBC # BLD AUTO: 4.21 M/UL (ref 4–5.4)
SODIUM SERPL-SCNC: 140 MMOL/L (ref 136–145)
WBC # BLD AUTO: 6.6 K/UL (ref 3.9–12.7)

## 2023-01-17 PROCEDURE — 80053 COMPREHEN METABOLIC PANEL: CPT | Mod: HCNC | Performed by: STUDENT IN AN ORGANIZED HEALTH CARE EDUCATION/TRAINING PROGRAM

## 2023-01-17 PROCEDURE — 36415 COLL VENOUS BLD VENIPUNCTURE: CPT | Mod: HCNC | Performed by: STUDENT IN AN ORGANIZED HEALTH CARE EDUCATION/TRAINING PROGRAM

## 2023-01-17 PROCEDURE — 83036 HEMOGLOBIN GLYCOSYLATED A1C: CPT | Mod: HCNC | Performed by: STUDENT IN AN ORGANIZED HEALTH CARE EDUCATION/TRAINING PROGRAM

## 2023-01-17 PROCEDURE — 85025 COMPLETE CBC W/AUTO DIFF WBC: CPT | Mod: HCNC | Performed by: STUDENT IN AN ORGANIZED HEALTH CARE EDUCATION/TRAINING PROGRAM

## 2023-01-31 ENCOUNTER — PATIENT MESSAGE (OUTPATIENT)
Dept: INTERNAL MEDICINE | Facility: CLINIC | Age: 68
End: 2023-01-31
Payer: MEDICARE

## 2023-02-07 DIAGNOSIS — Z00.00 ENCOUNTER FOR MEDICARE ANNUAL WELLNESS EXAM: ICD-10-CM

## 2023-02-09 DIAGNOSIS — Z00.00 ENCOUNTER FOR MEDICARE ANNUAL WELLNESS EXAM: ICD-10-CM

## 2023-03-07 ENCOUNTER — OFFICE VISIT (OUTPATIENT)
Dept: CARDIOLOGY | Facility: CLINIC | Age: 68
End: 2023-03-07
Payer: MEDICARE

## 2023-03-07 VITALS
HEIGHT: 65 IN | HEART RATE: 72 BPM | DIASTOLIC BLOOD PRESSURE: 68 MMHG | SYSTOLIC BLOOD PRESSURE: 138 MMHG | BODY MASS INDEX: 27.85 KG/M2 | OXYGEN SATURATION: 100 % | WEIGHT: 167.13 LBS

## 2023-03-07 DIAGNOSIS — E78.5 HYPERLIPIDEMIA, UNSPECIFIED HYPERLIPIDEMIA TYPE: Primary | ICD-10-CM

## 2023-03-07 DIAGNOSIS — R07.89 NON-CARDIAC CHEST PAIN: ICD-10-CM

## 2023-03-07 DIAGNOSIS — R00.2 PALPITATIONS: ICD-10-CM

## 2023-03-07 PROCEDURE — 99999 PR PBB SHADOW E&M-EST. PATIENT-LVL III: CPT | Mod: PBBFAC,HCNC,GC, | Performed by: INTERNAL MEDICINE

## 2023-03-07 PROCEDURE — 3078F DIAST BP <80 MM HG: CPT | Mod: HCNC,CPTII,GC,S$GLB | Performed by: INTERNAL MEDICINE

## 2023-03-07 PROCEDURE — 1101F PR PT FALLS ASSESS DOC 0-1 FALLS W/OUT INJ PAST YR: ICD-10-PCS | Mod: HCNC,CPTII,GC,S$GLB | Performed by: INTERNAL MEDICINE

## 2023-03-07 PROCEDURE — 1126F AMNT PAIN NOTED NONE PRSNT: CPT | Mod: HCNC,CPTII,GC,S$GLB | Performed by: INTERNAL MEDICINE

## 2023-03-07 PROCEDURE — 1159F PR MEDICATION LIST DOCUMENTED IN MEDICAL RECORD: ICD-10-PCS | Mod: HCNC,CPTII,GC,S$GLB | Performed by: INTERNAL MEDICINE

## 2023-03-07 PROCEDURE — 3008F BODY MASS INDEX DOCD: CPT | Mod: HCNC,CPTII,GC,S$GLB | Performed by: INTERNAL MEDICINE

## 2023-03-07 PROCEDURE — 3044F HG A1C LEVEL LT 7.0%: CPT | Mod: HCNC,CPTII,GC,S$GLB | Performed by: INTERNAL MEDICINE

## 2023-03-07 PROCEDURE — 99204 OFFICE O/P NEW MOD 45 MIN: CPT | Mod: HCNC,GC,S$GLB, | Performed by: INTERNAL MEDICINE

## 2023-03-07 PROCEDURE — 1159F MED LIST DOCD IN RCRD: CPT | Mod: HCNC,CPTII,GC,S$GLB | Performed by: INTERNAL MEDICINE

## 2023-03-07 PROCEDURE — 3078F PR MOST RECENT DIASTOLIC BLOOD PRESSURE < 80 MM HG: ICD-10-PCS | Mod: HCNC,CPTII,GC,S$GLB | Performed by: INTERNAL MEDICINE

## 2023-03-07 PROCEDURE — 1101F PT FALLS ASSESS-DOCD LE1/YR: CPT | Mod: HCNC,CPTII,GC,S$GLB | Performed by: INTERNAL MEDICINE

## 2023-03-07 PROCEDURE — 3044F PR MOST RECENT HEMOGLOBIN A1C LEVEL <7.0%: ICD-10-PCS | Mod: HCNC,CPTII,GC,S$GLB | Performed by: INTERNAL MEDICINE

## 2023-03-07 PROCEDURE — 1126F PR PAIN SEVERITY QUANTIFIED, NO PAIN PRESENT: ICD-10-PCS | Mod: HCNC,CPTII,GC,S$GLB | Performed by: INTERNAL MEDICINE

## 2023-03-07 PROCEDURE — 3075F PR MOST RECENT SYSTOLIC BLOOD PRESS GE 130-139MM HG: ICD-10-PCS | Mod: HCNC,CPTII,GC,S$GLB | Performed by: INTERNAL MEDICINE

## 2023-03-07 PROCEDURE — 3075F SYST BP GE 130 - 139MM HG: CPT | Mod: HCNC,CPTII,GC,S$GLB | Performed by: INTERNAL MEDICINE

## 2023-03-07 PROCEDURE — 99999 PR PBB SHADOW E&M-EST. PATIENT-LVL III: ICD-10-PCS | Mod: PBBFAC,HCNC,GC, | Performed by: INTERNAL MEDICINE

## 2023-03-07 PROCEDURE — 3288F FALL RISK ASSESSMENT DOCD: CPT | Mod: HCNC,CPTII,GC,S$GLB | Performed by: INTERNAL MEDICINE

## 2023-03-07 PROCEDURE — 99204 PR OFFICE/OUTPT VISIT, NEW, LEVL IV, 45-59 MIN: ICD-10-PCS | Mod: HCNC,GC,S$GLB, | Performed by: INTERNAL MEDICINE

## 2023-03-07 PROCEDURE — 3008F PR BODY MASS INDEX (BMI) DOCUMENTED: ICD-10-PCS | Mod: HCNC,CPTII,GC,S$GLB | Performed by: INTERNAL MEDICINE

## 2023-03-07 PROCEDURE — 3288F PR FALLS RISK ASSESSMENT DOCUMENTED: ICD-10-PCS | Mod: HCNC,CPTII,GC,S$GLB | Performed by: INTERNAL MEDICINE

## 2023-03-07 NOTE — PROGRESS NOTES
"    PCP - Michelle Alarcon MD    Subjective:   Patient ID:  Celina Azevedo is a 67 y.o. y.o. female who presents for evaluation and treatment of chest pain and palpitation.     She had a visit to the ER complaining of chest pain and palpitations on 12/31/2022. She had normal ECG and troponin was negative. No obvious etiology was found, however, an outpatient referral for cardiology was placed.    Has METS>4. Able to clean houses, do her chores and lives life without symptoms or chest syndromes. Independent to ADLs. 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 Lives with her .     Today patient mentions no angina, heart failure symptoms, claudication or palpitations.     History:     Social History     Tobacco Use    Smoking status: Never    Smokeless tobacco: Never   Substance Use Topics    Alcohol use: No     No family history on file.    Meds:   Review of patient's allergies indicates:  No Known Allergies    Current Outpatient Medications:     atorvastatin (LIPITOR) 20 MG tablet, Take 1 tablet (20 mg total) by mouth once daily., Disp: 90 tablet, Rfl: 3    Review of Systems   Constitutional:  Negative for chills, fever, malaise/fatigue and weight loss.   HENT:  Negative for ear pain, hearing loss and tinnitus.    Respiratory:  Negative for cough, hemoptysis, sputum production and shortness of breath.    Cardiovascular:  Negative for chest pain, palpitations, orthopnea, claudication, leg swelling and PND.   Gastrointestinal:  Negative for abdominal pain, diarrhea, heartburn, nausea and vomiting.   Genitourinary:  Negative for dysuria and urgency.   Musculoskeletal:  Negative for back pain, myalgias and neck pain.   Neurological:  Negative for dizziness and headaches.   Psychiatric/Behavioral:  Negative for depression.      Objective:   /68 (BP Location: Left arm, Patient Position: Sitting, BP Method: Medium (Automatic))   Pulse 72   Ht 5' 5" (1.651 m)   Wt 75.8 kg (167 lb " 1.7 oz)   SpO2 100%   BMI 27.81 kg/m²   Physical Exam  Constitutional:       Appearance: Normal appearance.   Cardiovascular:      Rate and Rhythm: Normal rate and regular rhythm.      Pulses: Normal pulses.           Carotid pulses are 2+ on the right side and 2+ on the left side.       Radial pulses are 2+ on the right side and 2+ on the left side.        Femoral pulses are 2+ on the right side and 2+ on the left side.       Popliteal pulses are 2+ on the right side and 2+ on the left side.        Dorsalis pedis pulses are 2+ on the right side and 2+ on the left side.        Posterior tibial pulses are 2+ on the right side and 2+ on the left side.      Heart sounds: No murmur heard.  Pulmonary:      Effort: Pulmonary effort is normal. No respiratory distress.      Breath sounds: No stridor. No wheezing.   Abdominal:      General: Abdomen is flat. Bowel sounds are normal. There is no distension.      Palpations: Abdomen is soft.   Musculoskeletal:         General: No swelling. Normal range of motion.   Skin:     General: Skin is warm and dry.      Capillary Refill: Capillary refill takes less than 2 seconds.   Neurological:      General: No focal deficit present.      Mental Status: She is alert and oriented to person, place, and time.       Labs:     Lab Results   Component Value Date     01/17/2023    K 4.3 01/17/2023     01/17/2023    CO2 26 01/17/2023    BUN 20 01/17/2023    CREATININE 0.8 01/17/2023    ANIONGAP 9 01/17/2023     Lab Results   Component Value Date    HGBA1C 5.9 (H) 01/17/2023     Lab Results   Component Value Date    BNP 22 12/31/2022       Lab Results   Component Value Date    WBC 6.60 01/17/2023    HGB 12.1 01/17/2023    HCT 38.6 01/17/2023     01/17/2023    GRAN 4.1 01/17/2023    GRAN 61.8 01/17/2023     Lab Results   Component Value Date    CHOL 146 08/03/2022    HDL 65 08/03/2022    LDLCALC 55.8 (L) 08/03/2022    TRIG 126 08/03/2022       Lab Results   Component Value  Date     01/17/2023    K 4.3 01/17/2023     01/17/2023    CO2 26 01/17/2023    BUN 20 01/17/2023    CREATININE 0.8 01/17/2023    ANIONGAP 9 01/17/2023     Lab Results   Component Value Date    HGBA1C 5.9 (H) 01/17/2023     Lab Results   Component Value Date    BNP 22 12/31/2022    Lab Results   Component Value Date    WBC 6.60 01/17/2023    HGB 12.1 01/17/2023    HCT 38.6 01/17/2023     01/17/2023    GRAN 4.1 01/17/2023    GRAN 61.8 01/17/2023     Lab Results   Component Value Date    CHOL 146 08/03/2022    HDL 65 08/03/2022    LDLCALC 55.8 (L) 08/03/2022    TRIG 126 08/03/2022                Cardiovascular Imaging:     Echo: no prior    Stress test: no prior    LHC: no prior    Assessment & Plan:     1. Hyperlipidemia, unspecified hyperlipidemia type    2. Palpitations    3. Non-cardiac chest pain      LDL is 55    She has no cardiac symptoms. She has no life limiting symptoms. Able to do activities without issues. She does not exercise regularly but she is active and with her work she is hardly sedentary.     She does not need further cardiac workup.     She has not taken her statin medications and will check a new lipid panel     May need to do coronary calcium score to help her towards decision of using a statin medication. She is currently using herbal medications to help with her cholesterol.     Will follow up and see her PRN    Signed:  Jamie Lunsford M.D.  Cardiovascular Fellow  [facility]

## 2023-03-10 ENCOUNTER — LAB VISIT (OUTPATIENT)
Dept: LAB | Facility: HOSPITAL | Age: 68
End: 2023-03-10
Payer: MEDICARE

## 2023-03-10 DIAGNOSIS — E78.5 HYPERLIPIDEMIA, UNSPECIFIED HYPERLIPIDEMIA TYPE: ICD-10-CM

## 2023-03-10 LAB
CHOLEST SERPL-MCNC: 239 MG/DL (ref 120–199)
CHOLEST/HDLC SERPL: 4.3 {RATIO} (ref 2–5)
HDLC SERPL-MCNC: 56 MG/DL (ref 40–75)
HDLC SERPL: 23.4 % (ref 20–50)
LDLC SERPL CALC-MCNC: 152.2 MG/DL (ref 63–159)
NONHDLC SERPL-MCNC: 183 MG/DL
TRIGL SERPL-MCNC: 154 MG/DL (ref 30–150)

## 2023-03-10 PROCEDURE — 36415 COLL VENOUS BLD VENIPUNCTURE: CPT | Mod: HCNC | Performed by: INTERNAL MEDICINE

## 2023-03-10 PROCEDURE — 80061 LIPID PANEL: CPT | Mod: HCNC | Performed by: INTERNAL MEDICINE

## 2023-03-23 ENCOUNTER — OFFICE VISIT (OUTPATIENT)
Dept: DERMATOLOGY | Facility: CLINIC | Age: 68
End: 2023-03-23
Payer: MEDICARE

## 2023-03-23 DIAGNOSIS — L29.9 PRURITUS: Primary | ICD-10-CM

## 2023-03-23 DIAGNOSIS — L82.1 SEBORRHEIC KERATOSES: ICD-10-CM

## 2023-03-23 PROCEDURE — 1101F PT FALLS ASSESS-DOCD LE1/YR: CPT | Mod: HCNC,CPTII,S$GLB, | Performed by: DERMATOLOGY

## 2023-03-23 PROCEDURE — 3288F PR FALLS RISK ASSESSMENT DOCUMENTED: ICD-10-PCS | Mod: HCNC,CPTII,S$GLB, | Performed by: DERMATOLOGY

## 2023-03-23 PROCEDURE — 1101F PR PT FALLS ASSESS DOC 0-1 FALLS W/OUT INJ PAST YR: ICD-10-PCS | Mod: HCNC,CPTII,S$GLB, | Performed by: DERMATOLOGY

## 2023-03-23 PROCEDURE — 1159F PR MEDICATION LIST DOCUMENTED IN MEDICAL RECORD: ICD-10-PCS | Mod: HCNC,CPTII,S$GLB, | Performed by: DERMATOLOGY

## 2023-03-23 PROCEDURE — 99999 PR PBB SHADOW E&M-EST. PATIENT-LVL III: ICD-10-PCS | Mod: PBBFAC,HCNC,, | Performed by: DERMATOLOGY

## 2023-03-23 PROCEDURE — 3044F HG A1C LEVEL LT 7.0%: CPT | Mod: HCNC,CPTII,S$GLB, | Performed by: DERMATOLOGY

## 2023-03-23 PROCEDURE — 3044F PR MOST RECENT HEMOGLOBIN A1C LEVEL <7.0%: ICD-10-PCS | Mod: HCNC,CPTII,S$GLB, | Performed by: DERMATOLOGY

## 2023-03-23 PROCEDURE — 1160F PR REVIEW ALL MEDS BY PRESCRIBER/CLIN PHARMACIST DOCUMENTED: ICD-10-PCS | Mod: HCNC,CPTII,S$GLB, | Performed by: DERMATOLOGY

## 2023-03-23 PROCEDURE — 1159F MED LIST DOCD IN RCRD: CPT | Mod: HCNC,CPTII,S$GLB, | Performed by: DERMATOLOGY

## 2023-03-23 PROCEDURE — 99204 OFFICE O/P NEW MOD 45 MIN: CPT | Mod: HCNC,S$GLB,, | Performed by: DERMATOLOGY

## 2023-03-23 PROCEDURE — 1126F AMNT PAIN NOTED NONE PRSNT: CPT | Mod: HCNC,CPTII,S$GLB, | Performed by: DERMATOLOGY

## 2023-03-23 PROCEDURE — 3288F FALL RISK ASSESSMENT DOCD: CPT | Mod: HCNC,CPTII,S$GLB, | Performed by: DERMATOLOGY

## 2023-03-23 PROCEDURE — 99204 PR OFFICE/OUTPT VISIT, NEW, LEVL IV, 45-59 MIN: ICD-10-PCS | Mod: HCNC,S$GLB,, | Performed by: DERMATOLOGY

## 2023-03-23 PROCEDURE — 1126F PR PAIN SEVERITY QUANTIFIED, NO PAIN PRESENT: ICD-10-PCS | Mod: HCNC,CPTII,S$GLB, | Performed by: DERMATOLOGY

## 2023-03-23 PROCEDURE — 99999 PR PBB SHADOW E&M-EST. PATIENT-LVL III: CPT | Mod: PBBFAC,HCNC,, | Performed by: DERMATOLOGY

## 2023-03-23 PROCEDURE — 1160F RVW MEDS BY RX/DR IN RCRD: CPT | Mod: HCNC,CPTII,S$GLB, | Performed by: DERMATOLOGY

## 2023-03-23 RX ORDER — TRIAMCINOLONE ACETONIDE 1 MG/ML
LOTION TOPICAL
Qty: 60 ML | Refills: 3 | Status: SHIPPED | OUTPATIENT
Start: 2023-03-23 | End: 2024-01-24 | Stop reason: HOSPADM

## 2023-03-23 NOTE — PATIENT INSTRUCTIONS
Bracioradial pruritus- itching in right arm   This condition is thought to be related to a nerve under the skin  Can be triggered by stress, hot showers, and exacerbated by chronic rubbing  Cold compresses, sarna lotion, Dermeleve can soothe the itching sensation, Sarna and Dermeleve can be purchansed online   Topical steroids may give mild relief  Occasionally oral medications that control nerve impulses can help     Triamcinolone   Pt educated that overuse of steroids can lead to skin thinning/atrophy, hypopigmentation, striae.

## 2023-03-23 NOTE — PROGRESS NOTES
Subjective:       Patient ID:  Celina Azevedo is a 67 y.o. female who presents for   Chief Complaint   Patient presents with    Rash     R arm      Pt here at today's visit with friend. Offered her , pt denied, stated her friend understands english and will translate for pt.   Pt states she does not recall trauma to her elbow.      Also has brown lesion on left hand. Concerned bc someone told her it may be a skin cancer. Present for 2months    Rash - Initial  Affected locations: right arm  Duration: 2 months  Signs / symptoms: itching  Severity: moderate  Timing: constant  Aggravated by: nothing  Treatments tried: OTC cream, benadryl.  Improvement on treatment: mild    Review of Systems   Skin:  Positive for itching and rash.      Objective:    Physical Exam   Constitutional: She appears well-developed and well-nourished. No distress.   Neurological: She is alert and oriented to person, place, and time. She is not disoriented.   Psychiatric: She has a normal mood and affect.   Skin:   Areas Examined (abnormalities noted in diagram):   RUE Inspected  LUE Inspection Performed                Diagram Legend     Erythematous scaling macule/papule c/w actinic keratosis       Vascular papule c/w angioma      Pigmented verrucoid papule/plaque c/w seborrheic keratosis      Yellow umbilicated papule c/w sebaceous hyperplasia      Irregularly shaped tan macule c/w lentigo     1-2 mm smooth white papules consistent with Milia      Movable subcutaneous cyst with punctum c/w epidermal inclusion cyst      Subcutaneous movable cyst c/w pilar cyst      Firm pink to brown papule c/w dermatofibroma      Pedunculated fleshy papule(s) c/w skin tag(s)      Evenly pigmented macule c/w junctional nevus     Mildly variegated pigmented, slightly irregular-bordered macule c/w mildly atypical nevus      Flesh colored to evenly pigmented papule c/w intradermal nevus       Pink pearly papule/plaque c/w basal cell carcinoma       Erythematous hyperkeratotic cursted plaque c/w SCC      Surgical scar with no sign of skin cancer recurrence      Open and closed comedones      Inflammatory papules and pustules      Verrucoid papule consistent consistent with wart     Erythematous eczematous patches and plaques     Dystrophic onycholytic nail with subungual debris c/w onychomycosis     Umbilicated papule    Erythematous-base heme-crusted tan verrucoid plaque consistent with inflamed seborrheic keratosis     Erythematous Silvery Scaling Plaque c/w Psoriasis     See annotation      Assessment / Plan:        Pruritus  -     triamcinolone acetonide 0.1% (KENALOG) 0.1 % Lotn; aaa qd- bid prn flare. Not more than 2 weeks straight in the same location  Dispense: 60 mL; Refill: 3  Pt educated that overuse of steroids can lead to skin thinning/atrophy, hypopigmentation, striae.    Bracioradial pruritus- itching in right arm   This condition is thought to be related to a nerve under the skin  Can be triggered by stress, hot showers, and exacerbated by chronic rubbing  Cold compresses, sarna lotion, Dermeleve can soothe the itching sensation, Sarna and Dermeleve can be purchansed online   Topical steroids may give mild relief  Occasionally oral medications that control nerve impulses can help     Seborrheic keratoses  These are benign inherited growths without a malignant potential. Reassurance given to patient. No treatment is necessary.     -     Ambulatory referral/consult to Dermatology             Follow up if symptoms worsen or fail to improve.

## 2023-04-17 PROBLEM — Z00.00 HEALTHCARE MAINTENANCE: Status: RESOLVED | Noted: 2023-01-13 | Resolved: 2023-04-17

## 2023-04-17 PROBLEM — Z00.00 ROUTINE GENERAL MEDICAL EXAMINATION AT A HEALTH CARE FACILITY: Status: RESOLVED | Noted: 2023-01-13 | Resolved: 2023-04-17

## 2023-04-27 ENCOUNTER — CLINICAL SUPPORT (OUTPATIENT)
Dept: ENDOSCOPY | Facility: HOSPITAL | Age: 68
End: 2023-04-27
Attending: STUDENT IN AN ORGANIZED HEALTH CARE EDUCATION/TRAINING PROGRAM
Payer: MEDICARE

## 2023-04-27 DIAGNOSIS — Z00.00 HEALTHCARE MAINTENANCE: ICD-10-CM

## 2023-04-27 NOTE — PLAN OF CARE
We attempted to reach you for your scheduled PAT appointment x2 calls but you were not available. Left voicemail messages. Please contact Endoscopy Scheduling at # [phone].

## 2023-05-12 ENCOUNTER — TELEPHONE (OUTPATIENT)
Dept: ENDOSCOPY | Facility: HOSPITAL | Age: 68
End: 2023-05-12
Payer: MEDICARE

## 2023-05-16 ENCOUNTER — TELEPHONE (OUTPATIENT)
Dept: ENDOSCOPY | Facility: HOSPITAL | Age: 68
End: 2023-05-16
Payer: MEDICARE

## 2023-05-16 NOTE — TELEPHONE ENCOUNTER
Multiple attempts have been made to contact patient to schedule procedure.  Patient did not schedule per protocol and no further attempts to contact patient will be made regarding this request.  Thank you.  Endoscopy Scheduling

## 2023-05-22 ENCOUNTER — OFFICE VISIT (OUTPATIENT)
Dept: INTERNAL MEDICINE | Facility: CLINIC | Age: 68
End: 2023-05-22
Payer: MEDICARE

## 2023-05-22 ENCOUNTER — LAB VISIT (OUTPATIENT)
Dept: LAB | Facility: HOSPITAL | Age: 68
End: 2023-05-22
Attending: STUDENT IN AN ORGANIZED HEALTH CARE EDUCATION/TRAINING PROGRAM
Payer: MEDICARE

## 2023-05-22 VITALS
HEART RATE: 73 BPM | DIASTOLIC BLOOD PRESSURE: 64 MMHG | HEIGHT: 65 IN | BODY MASS INDEX: 27.71 KG/M2 | SYSTOLIC BLOOD PRESSURE: 112 MMHG | WEIGHT: 166.31 LBS

## 2023-05-22 DIAGNOSIS — G44.049 CHRONIC PAROXYSMAL HEMICRANIA, NOT INTRACTABLE: Primary | ICD-10-CM

## 2023-05-22 DIAGNOSIS — E78.1 HYPERTRIGLYCERIDEMIA: ICD-10-CM

## 2023-05-22 DIAGNOSIS — R73.03 PREDIABETES: ICD-10-CM

## 2023-05-22 DIAGNOSIS — D64.9 NORMOCYTIC ANEMIA: ICD-10-CM

## 2023-05-22 DIAGNOSIS — Z00.00 HEALTHCARE MAINTENANCE: ICD-10-CM

## 2023-05-22 DIAGNOSIS — G44.049 CHRONIC PAROXYSMAL HEMICRANIA, NOT INTRACTABLE: ICD-10-CM

## 2023-05-22 PROBLEM — R51.9 HEADACHE: Status: ACTIVE | Noted: 2023-05-22

## 2023-05-22 LAB
ANION GAP SERPL CALC-SCNC: 7 MMOL/L (ref 8–16)
BASOPHILS # BLD AUTO: 0.02 K/UL (ref 0–0.2)
BASOPHILS NFR BLD: 0.2 % (ref 0–1.9)
BUN SERPL-MCNC: 14 MG/DL (ref 8–23)
CALCIUM SERPL-MCNC: 9.6 MG/DL (ref 8.7–10.5)
CHLORIDE SERPL-SCNC: 104 MMOL/L (ref 95–110)
CO2 SERPL-SCNC: 30 MMOL/L (ref 23–29)
CREAT SERPL-MCNC: 0.8 MG/DL (ref 0.5–1.4)
DIFFERENTIAL METHOD: ABNORMAL
EOSINOPHIL # BLD AUTO: 0.1 K/UL (ref 0–0.5)
EOSINOPHIL NFR BLD: 0.6 % (ref 0–8)
ERYTHROCYTE [DISTWIDTH] IN BLOOD BY AUTOMATED COUNT: 14 % (ref 11.5–14.5)
EST. GFR  (NO RACE VARIABLE): >60 ML/MIN/1.73 M^2
ESTIMATED AVG GLUCOSE: 123 MG/DL (ref 68–131)
GLUCOSE SERPL-MCNC: 102 MG/DL (ref 70–110)
HBA1C MFR BLD: 5.9 % (ref 4–5.6)
HCT VFR BLD AUTO: 39.6 % (ref 37–48.5)
HGB BLD-MCNC: 12.2 G/DL (ref 12–16)
IMM GRANULOCYTES # BLD AUTO: 0.08 K/UL (ref 0–0.04)
IMM GRANULOCYTES NFR BLD AUTO: 0.6 % (ref 0–0.5)
LYMPHOCYTES # BLD AUTO: 2 K/UL (ref 1–4.8)
LYMPHOCYTES NFR BLD: 15.7 % (ref 18–48)
MCH RBC QN AUTO: 28.5 PG (ref 27–31)
MCHC RBC AUTO-ENTMCNC: 30.8 G/DL (ref 32–36)
MCV RBC AUTO: 93 FL (ref 82–98)
MONOCYTES # BLD AUTO: 0.8 K/UL (ref 0.3–1)
MONOCYTES NFR BLD: 5.9 % (ref 4–15)
NEUTROPHILS # BLD AUTO: 9.9 K/UL (ref 1.8–7.7)
NEUTROPHILS NFR BLD: 77 % (ref 38–73)
NRBC BLD-RTO: 0 /100 WBC
PLATELET # BLD AUTO: 224 K/UL (ref 150–450)
PMV BLD AUTO: 11.4 FL (ref 9.2–12.9)
POTASSIUM SERPL-SCNC: 4.3 MMOL/L (ref 3.5–5.1)
RBC # BLD AUTO: 4.28 M/UL (ref 4–5.4)
SODIUM SERPL-SCNC: 141 MMOL/L (ref 136–145)
WBC # BLD AUTO: 12.82 K/UL (ref 3.9–12.7)

## 2023-05-22 PROCEDURE — 3078F PR MOST RECENT DIASTOLIC BLOOD PRESSURE < 80 MM HG: ICD-10-PCS | Mod: CPTII,S$GLB,, | Performed by: STUDENT IN AN ORGANIZED HEALTH CARE EDUCATION/TRAINING PROGRAM

## 2023-05-22 PROCEDURE — 1160F PR REVIEW ALL MEDS BY PRESCRIBER/CLIN PHARMACIST DOCUMENTED: ICD-10-PCS | Mod: CPTII,S$GLB,, | Performed by: STUDENT IN AN ORGANIZED HEALTH CARE EDUCATION/TRAINING PROGRAM

## 2023-05-22 PROCEDURE — 3074F PR MOST RECENT SYSTOLIC BLOOD PRESSURE < 130 MM HG: ICD-10-PCS | Mod: CPTII,S$GLB,, | Performed by: STUDENT IN AN ORGANIZED HEALTH CARE EDUCATION/TRAINING PROGRAM

## 2023-05-22 PROCEDURE — 3074F SYST BP LT 130 MM HG: CPT | Mod: CPTII,S$GLB,, | Performed by: STUDENT IN AN ORGANIZED HEALTH CARE EDUCATION/TRAINING PROGRAM

## 2023-05-22 PROCEDURE — 3288F PR FALLS RISK ASSESSMENT DOCUMENTED: ICD-10-PCS | Mod: CPTII,S$GLB,, | Performed by: STUDENT IN AN ORGANIZED HEALTH CARE EDUCATION/TRAINING PROGRAM

## 2023-05-22 PROCEDURE — 83036 HEMOGLOBIN GLYCOSYLATED A1C: CPT | Performed by: STUDENT IN AN ORGANIZED HEALTH CARE EDUCATION/TRAINING PROGRAM

## 2023-05-22 PROCEDURE — 3078F DIAST BP <80 MM HG: CPT | Mod: CPTII,S$GLB,, | Performed by: STUDENT IN AN ORGANIZED HEALTH CARE EDUCATION/TRAINING PROGRAM

## 2023-05-22 PROCEDURE — 99999 PR PBB SHADOW E&M-EST. PATIENT-LVL III: ICD-10-PCS | Mod: PBBFAC,,, | Performed by: STUDENT IN AN ORGANIZED HEALTH CARE EDUCATION/TRAINING PROGRAM

## 2023-05-22 PROCEDURE — 1101F PT FALLS ASSESS-DOCD LE1/YR: CPT | Mod: CPTII,S$GLB,, | Performed by: STUDENT IN AN ORGANIZED HEALTH CARE EDUCATION/TRAINING PROGRAM

## 2023-05-22 PROCEDURE — 1101F PR PT FALLS ASSESS DOC 0-1 FALLS W/OUT INJ PAST YR: ICD-10-PCS | Mod: CPTII,S$GLB,, | Performed by: STUDENT IN AN ORGANIZED HEALTH CARE EDUCATION/TRAINING PROGRAM

## 2023-05-22 PROCEDURE — 3008F BODY MASS INDEX DOCD: CPT | Mod: CPTII,S$GLB,, | Performed by: STUDENT IN AN ORGANIZED HEALTH CARE EDUCATION/TRAINING PROGRAM

## 2023-05-22 PROCEDURE — 99999 PR PBB SHADOW E&M-EST. PATIENT-LVL III: CPT | Mod: PBBFAC,,, | Performed by: STUDENT IN AN ORGANIZED HEALTH CARE EDUCATION/TRAINING PROGRAM

## 2023-05-22 PROCEDURE — 99214 OFFICE O/P EST MOD 30 MIN: CPT | Mod: S$GLB,,, | Performed by: STUDENT IN AN ORGANIZED HEALTH CARE EDUCATION/TRAINING PROGRAM

## 2023-05-22 PROCEDURE — 80048 BASIC METABOLIC PNL TOTAL CA: CPT | Performed by: STUDENT IN AN ORGANIZED HEALTH CARE EDUCATION/TRAINING PROGRAM

## 2023-05-22 PROCEDURE — 1160F RVW MEDS BY RX/DR IN RCRD: CPT | Mod: CPTII,S$GLB,, | Performed by: STUDENT IN AN ORGANIZED HEALTH CARE EDUCATION/TRAINING PROGRAM

## 2023-05-22 PROCEDURE — 3044F PR MOST RECENT HEMOGLOBIN A1C LEVEL <7.0%: ICD-10-PCS | Mod: CPTII,S$GLB,, | Performed by: STUDENT IN AN ORGANIZED HEALTH CARE EDUCATION/TRAINING PROGRAM

## 2023-05-22 PROCEDURE — 3008F PR BODY MASS INDEX (BMI) DOCUMENTED: ICD-10-PCS | Mod: CPTII,S$GLB,, | Performed by: STUDENT IN AN ORGANIZED HEALTH CARE EDUCATION/TRAINING PROGRAM

## 2023-05-22 PROCEDURE — 99213 OFFICE O/P EST LOW 20 MIN: CPT | Performed by: STUDENT IN AN ORGANIZED HEALTH CARE EDUCATION/TRAINING PROGRAM

## 2023-05-22 PROCEDURE — 85025 COMPLETE CBC W/AUTO DIFF WBC: CPT | Performed by: STUDENT IN AN ORGANIZED HEALTH CARE EDUCATION/TRAINING PROGRAM

## 2023-05-22 PROCEDURE — 1126F AMNT PAIN NOTED NONE PRSNT: CPT | Mod: CPTII,S$GLB,, | Performed by: STUDENT IN AN ORGANIZED HEALTH CARE EDUCATION/TRAINING PROGRAM

## 2023-05-22 PROCEDURE — 99214 PR OFFICE/OUTPT VISIT, EST, LEVL IV, 30-39 MIN: ICD-10-PCS | Mod: S$GLB,,, | Performed by: STUDENT IN AN ORGANIZED HEALTH CARE EDUCATION/TRAINING PROGRAM

## 2023-05-22 PROCEDURE — 1159F PR MEDICATION LIST DOCUMENTED IN MEDICAL RECORD: ICD-10-PCS | Mod: CPTII,S$GLB,, | Performed by: STUDENT IN AN ORGANIZED HEALTH CARE EDUCATION/TRAINING PROGRAM

## 2023-05-22 PROCEDURE — 3288F FALL RISK ASSESSMENT DOCD: CPT | Mod: CPTII,S$GLB,, | Performed by: STUDENT IN AN ORGANIZED HEALTH CARE EDUCATION/TRAINING PROGRAM

## 2023-05-22 PROCEDURE — 36415 COLL VENOUS BLD VENIPUNCTURE: CPT | Performed by: STUDENT IN AN ORGANIZED HEALTH CARE EDUCATION/TRAINING PROGRAM

## 2023-05-22 PROCEDURE — 3044F HG A1C LEVEL LT 7.0%: CPT | Mod: CPTII,S$GLB,, | Performed by: STUDENT IN AN ORGANIZED HEALTH CARE EDUCATION/TRAINING PROGRAM

## 2023-05-22 PROCEDURE — 1159F MED LIST DOCD IN RCRD: CPT | Mod: CPTII,S$GLB,, | Performed by: STUDENT IN AN ORGANIZED HEALTH CARE EDUCATION/TRAINING PROGRAM

## 2023-05-22 PROCEDURE — 1126F PR PAIN SEVERITY QUANTIFIED, NO PAIN PRESENT: ICD-10-PCS | Mod: CPTII,S$GLB,, | Performed by: STUDENT IN AN ORGANIZED HEALTH CARE EDUCATION/TRAINING PROGRAM

## 2023-05-22 RX ORDER — ATORVASTATIN CALCIUM 20 MG/1
20 TABLET, FILM COATED ORAL DAILY
Qty: 90 TABLET | Refills: 3 | Status: SHIPPED | OUTPATIENT
Start: 2023-05-22 | End: 2023-09-25 | Stop reason: SDUPTHER

## 2023-05-22 NOTE — ASSESSMENT & PLAN NOTE
Health care maintenance and core gaps reviewed and assessed with patient.  Vaccinations recommended:        Tetanus - O       Shingles - O       Influenza - N/A       Pneumonia - O  Colon cancer screening:   Colonoscopy: O  Lifestyle recommendations given.  Physical activity recommended.    Legend: Ordered (O), Declined (D), Current (C)

## 2023-05-22 NOTE — PROGRESS NOTES
"Subjective:       Patient ID: Celina Azevedo is a 67 y.o. female.    Chief Complaint: Follow-up    Follow-up      Celina Azevedo is a 67 y.o. female , [place] speaking, with a history of:  Menopause  OA knee  Osteopenia  S/P left knee arthroscopy  Prediabetes  HLD    Patient comes to the clinic complaining of headache    Patient complains of 1 month history of right-sided headache over the temporoparietal area, mainly at night, sometimes he wakes her up, accompanied with some nausea.  Patient denies any prodromic episodes or aura.  This had never happened before.  She reports that she had a ground level fall 2 months ago when she slid over the wet floor and fell over her head, right-sided, with posterior headache after the episode, no loss of consciousness.    She also reports high levels of anxiety, she is worried about her family in [place] daily.      She has not taken any medications for her headache, she has not tried Tylenol or other medications because she says she does not like to take any drugs.  She has not had sleep disturbances unless she is woken up with a headache which happens maybe once a week.  No other symptoms.    She has not changed her diet as recommended.    She had suspended the use of statin because she ran out of the medication and she did not call for a refill.      No other complaints or concerns.    Healthcare Maintenance:  Colonoscopy: ordered  Vaccinations: Pneumonia 2022, Zoster, Tetanus  COVID vaccination: completed  Depression screening: PHQ2 score = 0    Annual Wellness visit approx. Month: January ROS  11-point review of systems done. Negative except for detailed in the HPI.        Objective:      Vitals:    05/22/23 1524   BP: 112/64   Pulse: 73   Weight: 75.4 kg (166 lb 4.8 oz)   Height: 5' 5" (1.651 m)         Physical Exam  Vitals and nursing note reviewed.   Constitutional:       Appearance: Normal appearance.   HENT:      Head: Normocephalic and atraumatic.      Right Ear: " Tympanic membrane normal.      Left Ear: Tympanic membrane normal.      Nose: Nose normal.      Mouth/Throat:      Mouth: Mucous membranes are moist.      Pharynx: Oropharynx is clear.   Eyes:      Extraocular Movements: Extraocular movements intact.      Conjunctiva/sclera: Conjunctivae normal.      Pupils: Pupils are equal, round, and reactive to light.   Cardiovascular:      Rate and Rhythm: Normal rate and regular rhythm.      Pulses: Normal pulses.      Heart sounds: Normal heart sounds.   Pulmonary:      Effort: Pulmonary effort is normal.      Breath sounds: Normal breath sounds.   Abdominal:      General: Bowel sounds are normal. There is no distension.      Palpations: Abdomen is soft.      Tenderness: There is no abdominal tenderness.   Musculoskeletal:         General: Normal range of motion.      Cervical back: Normal range of motion and neck supple.   Skin:     General: Skin is warm.   Neurological:      General: No focal deficit present.      Mental Status: She is alert and oriented to person, place, and time. Mental status is at baseline.      Motor: No weakness.      Coordination: Coordination normal.      Gait: Gait normal.      Deep Tendon Reflexes: Reflexes normal.   Psychiatric:         Mood and Affect: Mood normal.          Assessment:       1. Chronic paroxysmal hemicrania, not intractable  Assessment & Plan:  Likely 2/2 tension headache  However, she had a h/o fall with head trauma 2 months ago and after that headaches appeared  Will order CT brain to r/u subarachnoid hemorrhage (less likely)  Will recommend tylenol for pain  Relaxation exercises.        Orders:  -     CT Head Without Contrast; Future; Expected date: 05/22/2023  -     CBC Auto Differential; Future; Expected date: 05/22/2023    2. Hypertriglyceridemia  -     atorvastatin (LIPITOR) 20 MG tablet; Take 1 tablet (20 mg total) by mouth once daily.  Dispense: 90 tablet; Refill: 3    3. Healthcare maintenance  Assessment &  Plan:  Health care maintenance and core gaps reviewed and assessed with patient.  Vaccinations recommended:        Tetanus - O       Shingles - O       Influenza - N/A       Pneumonia - O  Colon cancer screening:   Colonoscopy: O  Lifestyle recommendations given.  Physical activity recommended.    Legend: Ordered (O), Declined (D), Current (C)      Orders:  -     diphth,pertus,acell,,tetanus (BOOSTRIX) 2.5-8-5 Lf-mcg-Lf/0.5mL Susp; Inject 0.5 mLs into the muscle once. for 1 dose  Dispense: 0.5 mL; Refill: 0    4. Prediabetes  Assessment & Plan:  Will repeat A1c  Lifestyle recommendations given    Orders:  -     Basic Metabolic Panel; Future; Expected date: 05/22/2023  -     Hemoglobin A1C; Future; Expected date: 05/22/2023    5. Normocytic anemia  Assessment & Plan:  Will check CBC         Plan:         CT head W/O contrast ordered  CBC, CMP, A1c ordered  F/u in 2 weeks  Refilled statin      Education provided  Lifestyle recommendations given  AVS printed, explained, and given to the patient.  RTC in : 2 weeks for review of test results            TRICIA BUNDY MD, MPH  Internal Medicine  [facility]  [facility]

## 2023-05-22 NOTE — ASSESSMENT & PLAN NOTE
Likely 2/2 tension headache  However, she had a h/o fall with head trauma 2 months ago and after that headaches appeared  Will order CT brain to r/u subarachnoid hemorrhage (less likely)  Will recommend tylenol for pain  Relaxation exercises.

## 2023-05-26 ENCOUNTER — HOSPITAL ENCOUNTER (OUTPATIENT)
Dept: RADIOLOGY | Facility: HOSPITAL | Age: 68
Discharge: HOME | End: 2023-05-26
Attending: STUDENT IN AN ORGANIZED HEALTH CARE EDUCATION/TRAINING PROGRAM
Payer: MEDICARE

## 2023-05-26 DIAGNOSIS — G44.049 CHRONIC PAROXYSMAL HEMICRANIA, NOT INTRACTABLE: ICD-10-CM

## 2023-05-26 PROCEDURE — 70450 CT HEAD WITHOUT CONTRAST: ICD-10-PCS | Mod: 26,,, | Performed by: RADIOLOGY

## 2023-05-26 PROCEDURE — 70450 CT HEAD/BRAIN W/O DYE: CPT | Mod: 26,,, | Performed by: RADIOLOGY

## 2023-05-26 PROCEDURE — 70450 CT HEAD/BRAIN W/O DYE: CPT | Mod: TC

## 2023-06-15 LAB — NONINV COLON CA DNA+OCC BLD SCRN STL QL: NORMAL

## 2023-06-24 PROCEDURE — 29505 APPLICATION LONG LEG SPLINT: CPT | Mod: HCNC,RT

## 2023-06-24 PROCEDURE — 99284 EMERGENCY DEPT VISIT MOD MDM: CPT | Mod: HCNC

## 2023-06-25 ENCOUNTER — HOSPITAL ENCOUNTER (EMERGENCY)
Facility: HOSPITAL | Age: 68
Discharge: HOME | End: 2023-06-25
Attending: EMERGENCY MEDICINE
Payer: MEDICARE

## 2023-06-25 VITALS
SYSTOLIC BLOOD PRESSURE: 134 MMHG | RESPIRATION RATE: 20 BRPM | HEART RATE: 109 BPM | TEMPERATURE: 98 F | DIASTOLIC BLOOD PRESSURE: 66 MMHG | OXYGEN SATURATION: 96 %

## 2023-06-25 DIAGNOSIS — S50.311A ELBOW ABRASION, RIGHT, INITIAL ENCOUNTER: ICD-10-CM

## 2023-06-25 DIAGNOSIS — S86.911A KNEE STRAIN, RIGHT, INITIAL ENCOUNTER: Primary | ICD-10-CM
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
## 2023-06-25 PROCEDURE — 99284 EMERGENCY DEPT VISIT MOD MDM: CPT | Mod: HCNC,25

## 2023-06-25 PROCEDURE — 25000003 PHARM REV CODE 250: Performed by: EMERGENCY MEDICINE

## 2023-06-25 PROCEDURE — 29505 APPLICATION LONG LEG SPLINT: CPT | Mod: HCNC,RT

## 2023-06-25 RX ORDER — ACETAMINOPHEN 500 MG
1000 TABLET ORAL
Status: COMPLETED | OUTPATIENT
Start: 2023-06-25 | End: 2023-06-25

## 2023-06-25 RX ADMIN — ACETAMINOPHEN 1000 MG: 500 TABLET ORAL at 12:06

## 2023-06-25 NOTE — DISCHARGE INSTRUCTIONS
Doss radiografía no mostró ninguna fractura. [place] Tylenol o Motrin según lo indicado para ayudar a aliviar el dolor. Zeynep un seguimiento con doss PCP or Orthopedic si le sigue doliendo la rodilla en alonso semana.

## 2023-06-25 NOTE — ED PROVIDER NOTES
Encounter Date: 6/24/2023       History     Chief Complaint   Patient presents with    Fall     Right leg pain, + pulses, limited ROM due to pain     67F with PMH L knee meniscotomy presenting with R knee pain s/p mech fall. Pt reports she was cleaning when she had mech fall, scraping her R elbow and landing on her R knee. Reports it's too painful to bear weight on knee. Also endorsing scrape on R ankle, but denies pain in ankle, elbow, or anywhere other than knee. No AC use, no N/W.     Review of patient's allergies indicates:  No Known Allergies  Past Medical History:   Diagnosis Date    Hyperlipidemia      Past Surgical History:   Procedure Laterality Date    ARTHROSCOPIC CHONDROPLASTY OF KNEE JOINT Left 4/1/2022    Procedure: ARTHROSCOPY, KNEE, WITH CHONDROPLASTY;  Surgeon: JEANINE Fuentes MD;  Location: [facility] OR;  Service: Orthopedics;  Laterality: Left;    HYSTERECTOMY      KNEE ARTHROSCOPY W/ MENISCECTOMY Left 4/1/2022    Procedure: ARTHROSCOPY, KNEE, WITH MENISCECTOMY partial lateral;  Surgeon: JEANINE Fuentes MD;  Location: [facility] OR;  Service: Orthopedics;  Laterality: Left;  regional with catheter- adductor  pericapsular injection: 30cc MILTON     No family history on file.  Social History     Tobacco Use    Smoking status: Never    Smokeless tobacco: Never   Substance Use Topics    Alcohol use: No    Drug use: No     Review of Systems    Physical Exam     Initial Vitals [06/24/23 2324]   BP Pulse Resp Temp SpO2   139/75 70 20 97.4 °F (36.3 °C) 98 %      MAP       --         Physical Exam    Nursing note and vitals reviewed.  Constitutional: She appears well-developed and well-nourished. She is not diaphoretic. No distress.   HENT:   Head: Normocephalic and atraumatic.   Nose: Nose normal.   Eyes: EOM are normal. Pupils are equal, round, and reactive to light.   Neck:   Normal range of motion.  Cardiovascular:  Intact distal pulses.           Musculoskeletal:         General: Tenderness (R knee) present.  No edema.      Cervical back: Normal range of motion.      Comments: No midline CTL TTP, no elbow or ankle TTP, no other TTP aside from knee. Painful ROM R knee but intact.      Neurological: She is alert and oriented to person, place, and time. She has normal strength. No sensory deficit.   Skin: Skin is warm and dry. Capillary refill takes less than 2 seconds.   2 cm superficial abrasion on R elbow, superficial scratch on medial R ankle.    Psychiatric: She has a normal mood and affect. Her behavior is normal. Judgment and thought content normal.       ED Course   Procedures  Labs Reviewed - No data to display       Imaging Results              X-Ray Knee 3 View Right (Final result)  Result time 06/25/23 01:55:13      Final result by Sharon Hardin MD (06/25/23 01:55:13)                   Impression:      No acute findings.      Electronically signed by: Sharon Hardin  Date:    06/25/2023  Time:    01:55               Narrative:    EXAMINATION:  XR KNEE 3 VIEW RIGHT    CLINICAL HISTORY:  Unspecified fall, initial encounter    TECHNIQUE:  AP, lateral, and Merchant views of the right knee were performed.    COMPARISON:  None.    FINDINGS:  No acute fracture or dislocation. Mild tricompartment osteoarthritis.  No significant joint effusion.                                       Medications   acetaminophen tablet 1,000 mg (1,000 mg Oral Given 6/25/23 0027)   Tdap (BOOSTRIX) vaccine injection 0.5 mL (0 mLs Intramuscular Return to Cabinet 6/25/23 0021)     Medical Decision Making:   History:   Old Medical Records: I decided to obtain old medical records.  Old Records Summarized: records from clinic visits, records from previous admission(s) and records from another hospital.       <> Summary of Records: No Tdap on file  Initial Assessment:   Pt suffered mech fall, with scrape on right elbow and ankle but no TTP and normal ROM unconcerning for sprain/fx. R knee mod TTP and painful ROM w/o deformity, likely  contusion but will obtain XR to eval for tibial plateau fx or other fx.   Independently Interpreted Test(s):   I have ordered and independently interpreted X-rays - see summary below.       <> Summary of X-Ray Reading(s): No fracture  Clinical Tests:   Radiological Study: Ordered and Reviewed  ED Management:  X-ray negative for fracture, will trial ambulate.  Tetanus updated.    2:34 AM  Patient unable to bear significant weight on knee, will place in knee immobilizer and advised nonweightbearing until follow-up outpatient. Stable for d/c, I discussed outpatient follow up and return precautions with pt and answered all questions.                          Clinical Impression:   Final diagnoses:  [W19.XXXA] Fall  [S86.911A] Knee strain, right, initial encounter (Primary)        ED Disposition Condition    Discharge Stable          ED Prescriptions    None       Follow-up Information       Follow up With Specialties Details Why Contact Info    Michelle Alarcon MD Internal Medicine In 1 week As needed [address] [facility] [address]  [phone]      [facility] - Emergency Dept Emergency Medicine Go to  As needed, If symptoms worsen [address] [facility] [id number] [phone]             Alexandra Corrales MD  06/25/23 3280

## 2023-06-25 NOTE — ED TRIAGE NOTES
Celina Azevedo, an 67 y.o. female presents to the ED     Chief Complaint   Patient presents with    Fall     Right leg pain, + pulses, limited ROM due to pain     Review of patient's allergies indicates:  No Known Allergies  Past Medical History:   Diagnosis Date    Hyperlipidemia

## 2023-06-28 ENCOUNTER — TELEPHONE (OUTPATIENT)
Dept: SPORTS MEDICINE | Facility: CLINIC | Age: 68
End: 2023-06-28
Payer: MEDICARE

## 2023-06-28 NOTE — PROGRESS NOTES
CC: Right knee pain    DATE OF PROCEDURE: 4/1/2022   PROCEDURE PERFORMED:   Left knee arthroscopic partial lateral meniscectomy (CPT 90322)      Left knee arthroscopic chondroplasty   Left knee arthroscopic partial synovectomy    67 y.o. who returns to see me with a new complaint.  She fell this past Saturday on her flexed right knee and was unable to bear much weight afterwards.  She was seen in the emergency department.  X-rays at that time were negative.  She was given a knee immobilizer and told to follow up with Orthopedics.  She presents to clinic today with her .  She is partially English speaking.  We had the assistance of a  today as well (#[id number]).  She has been ambulating with a walker.  Unable to bear much weight on the right lower extremity.  Tylenol and anti-inflammatory medication as needed.  Has included ice.  Localizes pain anteromedially.    REVIEW OF SYSTEMS:   Constitution: Negative. Negative for chills, fever and night sweats.    Hematologic/Lymphatic: Negative for bleeding problem. Does not bruise/bleed easily.   Skin: Negative for dry skin, itching and rash.   Musculoskeletal: Negative for falls. Positive for right knee pain and muscle weakness.     All other review of symptoms were reviewed and found to be noncontributory.     PAST MEDICAL HISTORY:   Past Medical History:   Diagnosis Date    Hyperlipidemia      PAST SURGICAL HISTORY:   Past Surgical History:   Procedure Laterality Date    ARTHROSCOPIC CHONDROPLASTY OF KNEE JOINT Left 4/1/2022    Procedure: ARTHROSCOPY, KNEE, WITH CHONDROPLASTY;  Surgeon: JEANINE Fuentes MD;  Location: [facility] OR;  Service: Orthopedics;  Laterality: Left;    HYSTERECTOMY      KNEE ARTHROSCOPY W/ MENISCECTOMY Left 4/1/2022    Procedure: ARTHROSCOPY, KNEE, WITH MENISCECTOMY partial lateral;  Surgeon: JEANINE Fuentes MD;  Location: [facility] OR;  Service: Orthopedics;  Laterality: Left;  regional with catheter- adductor  pericapsular injection: 30cc  MILTON     FAMILY HISTORY:   No family history on file.    SOCIAL HISTORY:   Social History     Socioeconomic History    Marital status:    Tobacco Use    Smoking status: Never    Smokeless tobacco: Never   Substance and Sexual Activity    Alcohol use: No    Drug use: No     Social Determinants of Health     Financial Resource Strain: Low Risk     Difficulty of Paying Living Expenses: Not hard at all   Food Insecurity: No Food Insecurity    Worried About Running Out of Food in the Last Year: Never true    Ran Out of Food in the Last Year: Never true   Transportation Needs: No Transportation Needs    Lack of Transportation (Medical): No    Lack of Transportation (Non-Medical): No   Physical Activity: Inactive    Days of Exercise per Week: 0 days    Minutes of Exercise per Session: 0 min   Stress: No Stress Concern Present    Feeling of Stress : Not at all   Social Connections: Moderately Isolated    Frequency of Communication with Friends and Family: More than three times a week    Frequency of Social Gatherings with Friends and Family: More than three times a week    Attends Sikh Services: Never    Active Member of Clubs or Organizations: No    Attends Club or Organization Meetings: Never    Marital Status:    Housing Stability: Low Risk     Unable to Pay for Housing in the Last Year: No    Number of Places Lived in the Last Year: 1    Unstable Housing in the Last Year: No     MEDICATIONS:     Current Outpatient Medications:     atorvastatin (LIPITOR) 20 MG tablet, Take 1 tablet (20 mg total) by mouth once daily., Disp: 90 tablet, Rfl: 3    triamcinolone acetonide 0.1% (KENALOG) 0.1 % Lotn, aaa qd- bid prn flare. Not more than 2 weeks straight in the same location, Disp: 60 mL, Rfl: 3    naproxen (NAPROSYN) 500 MG tablet, Take 1 tablet (500 mg total) by mouth 2 (two) times daily with meals., Disp: 60 tablet, Rfl: 1    ALLERGIES:   Review of patient's allergies indicates:  No Known Allergies  "    PHYSICAL EXAMINATION:  /72   Pulse 87   Ht 5' 5" (1.651 m)   BMI 27.67 kg/m²   General: Well-developed well-nourished 67 y.o. femalein no acute distress   Cardiovascular: Regular rhythm by palpation of distal pulse, normal color and temperature, no concerning varicosities on symptomatic side   Lungs: No labored breathing or wheezing appreciated   Neuro: Alert and oriented ×3   Psychiatric: well oriented to person, place and time, demonstrates normal mood and affect   Skin: No rashes, lesions or ulcers, normal temperature, turgor, and texture on involved extremity    Ortho/SPM Exam  Examination of the right knee diffuse soft tissue swelling around the knee with some ecchymosis anteromedially.  Diffuse associated pain to palpation.  Trace effusion.  Nothing too significant.  She is able to hold the knee in full extension once positioned at that point.  She does have about a 5 degree flexion contracture.  Active assisted flexion to 100° with some pain.  Generalized tenderness to palpation both medially and laterally.  Stable to varus and valgus stress.    IMAGING:  X-rays including standing, weight bearing AP and flexion bilateral knees, RIGHT knee lateral and sunrise views ordered and images reviewed by me show:    No fracture seen.  She does have some tricompartmental degenerative changes with medial joint space narrowing.    ASSESSMENT:      ICD-10-CM ICD-9-CM   1. Contusion of right knee, initial encounter  S80.01XA 924.11   2. Primary osteoarthritis of right knee  M17.11 715.16     Rule out occult fracture versus stress reaction    PLAN:     Findings discussed with the patient.  She has some underlying DJD and findings consistent with knee contusion.  Intact extensor mechanism clinically today.  Unable to rule out occult fracture on x-ray.  The patient describes inability to bear weight with significant pain, not improving since her fall nearly 1 week ago.  For that reason I think an MRI of the knee " would be helpful.  Continue with weight-bearing to tolerance.  We gave her a short runner brace in lieu of the knee immobilizer.  Continue ice and oral medication for relief.  Follow up after MRI to discuss results and treatment options.  We will go ahead and place a referral to physical therapy as well.    Procedures

## 2023-06-29 ENCOUNTER — HOSPITAL ENCOUNTER (OUTPATIENT)
Dept: RADIOLOGY | Facility: HOSPITAL | Age: 68
Discharge: HOME | End: 2023-06-29
Attending: ORTHOPAEDIC SURGERY
Payer: MEDICARE

## 2023-06-29 ENCOUNTER — OFFICE VISIT (OUTPATIENT)
Dept: SPORTS MEDICINE | Facility: CLINIC | Age: 68
End: 2023-06-29
Payer: MEDICARE

## 2023-06-29 VITALS
SYSTOLIC BLOOD PRESSURE: 117 MMHG | DIASTOLIC BLOOD PRESSURE: 72 MMHG | BODY MASS INDEX: 27.67 KG/M2 | HEIGHT: 65 IN | HEART RATE: 87 BPM

## 2023-06-29 DIAGNOSIS — S86.911A KNEE STRAIN, RIGHT, INITIAL ENCOUNTER: ICD-10-CM

## 2023-06-29 DIAGNOSIS — S80.01XA CONTUSION OF RIGHT KNEE, INITIAL ENCOUNTER: Primary | ICD-10-CM

## 2023-06-29 DIAGNOSIS — M17.11 PRIMARY OSTEOARTHRITIS OF RIGHT KNEE: ICD-10-CM

## 2023-06-29 PROCEDURE — 73564 X-RAY EXAM KNEE 4 OR MORE: CPT | Mod: TC,HCNC,RT

## 2023-06-29 PROCEDURE — 99215 OFFICE O/P EST HI 40 MIN: CPT | Mod: HCNC,S$GLB,, | Performed by: ORTHOPAEDIC SURGERY

## 2023-06-29 PROCEDURE — 73562 X-RAY EXAM OF KNEE 3: CPT | Mod: 26,HCNC,LT, | Performed by: RADIOLOGY

## 2023-06-29 PROCEDURE — 1159F PR MEDICATION LIST DOCUMENTED IN MEDICAL RECORD: ICD-10-PCS | Mod: HCNC,CPTII,S$GLB, | Performed by: ORTHOPAEDIC SURGERY

## 2023-06-29 PROCEDURE — 3074F PR MOST RECENT SYSTOLIC BLOOD PRESSURE < 130 MM HG: ICD-10-PCS | Mod: HCNC,CPTII,S$GLB, | Performed by: ORTHOPAEDIC SURGERY

## 2023-06-29 PROCEDURE — 3008F BODY MASS INDEX DOCD: CPT | Mod: HCNC,CPTII,S$GLB, | Performed by: ORTHOPAEDIC SURGERY

## 2023-06-29 PROCEDURE — 3074F SYST BP LT 130 MM HG: CPT | Mod: HCNC,CPTII,S$GLB, | Performed by: ORTHOPAEDIC SURGERY

## 2023-06-29 PROCEDURE — 3078F DIAST BP <80 MM HG: CPT | Mod: HCNC,CPTII,S$GLB, | Performed by: ORTHOPAEDIC SURGERY

## 2023-06-29 PROCEDURE — 3078F PR MOST RECENT DIASTOLIC BLOOD PRESSURE < 80 MM HG: ICD-10-PCS | Mod: HCNC,CPTII,S$GLB, | Performed by: ORTHOPAEDIC SURGERY

## 2023-06-29 PROCEDURE — 1125F AMNT PAIN NOTED PAIN PRSNT: CPT | Mod: HCNC,CPTII,S$GLB, | Performed by: ORTHOPAEDIC SURGERY

## 2023-06-29 PROCEDURE — 1159F MED LIST DOCD IN RCRD: CPT | Mod: HCNC,CPTII,S$GLB, | Performed by: ORTHOPAEDIC SURGERY

## 2023-06-29 PROCEDURE — 99215 PR OFFICE/OUTPT VISIT, EST, LEVL V, 40-54 MIN: ICD-10-PCS | Mod: HCNC,S$GLB,, | Performed by: ORTHOPAEDIC SURGERY

## 2023-06-29 PROCEDURE — 73564 XR KNEE ORTHO RIGHT WITH FLEXION: ICD-10-PCS | Mod: 26,HCNC,RT, | Performed by: RADIOLOGY

## 2023-06-29 PROCEDURE — 73564 X-RAY EXAM KNEE 4 OR MORE: CPT | Mod: 26,HCNC,RT, | Performed by: RADIOLOGY

## 2023-06-29 PROCEDURE — 73562 XR KNEE ORTHO RIGHT WITH FLEXION: ICD-10-PCS | Mod: 26,HCNC,LT, | Performed by: RADIOLOGY

## 2023-06-29 PROCEDURE — 3044F HG A1C LEVEL LT 7.0%: CPT | Mod: HCNC,CPTII,S$GLB, | Performed by: ORTHOPAEDIC SURGERY

## 2023-06-29 PROCEDURE — 3008F PR BODY MASS INDEX (BMI) DOCUMENTED: ICD-10-PCS | Mod: HCNC,CPTII,S$GLB, | Performed by: ORTHOPAEDIC SURGERY

## 2023-06-29 PROCEDURE — 3044F PR MOST RECENT HEMOGLOBIN A1C LEVEL <7.0%: ICD-10-PCS | Mod: HCNC,CPTII,S$GLB, | Performed by: ORTHOPAEDIC SURGERY

## 2023-06-29 PROCEDURE — 99999 PR PBB SHADOW E&M-EST. PATIENT-LVL IV: CPT | Mod: PBBFAC,HCNC,, | Performed by: ORTHOPAEDIC SURGERY

## 2023-06-29 PROCEDURE — 1125F PR PAIN SEVERITY QUANTIFIED, PAIN PRESENT: ICD-10-PCS | Mod: HCNC,CPTII,S$GLB, | Performed by: ORTHOPAEDIC SURGERY

## 2023-06-29 PROCEDURE — 99999 PR PBB SHADOW E&M-EST. PATIENT-LVL IV: ICD-10-PCS | Mod: PBBFAC,HCNC,, | Performed by: ORTHOPAEDIC SURGERY

## 2023-06-29 RX ORDER — NAPROXEN 500 MG/1
500 TABLET ORAL 2 TIMES DAILY WITH MEALS
Qty: 60 TABLET | Refills: 1 | Status: SHIPPED | OUTPATIENT
Start: 2023-06-29 | End: 2023-06-29

## 2023-06-29 RX ORDER — NAPROXEN 500 MG/1
500 TABLET ORAL 2 TIMES DAILY WITH MEALS
Qty: 60 TABLET | Refills: 1 | Status: SHIPPED | OUTPATIENT
Start: 2023-06-29 | End: 2023-08-28

## 2023-06-30 ENCOUNTER — HOSPITAL ENCOUNTER (OUTPATIENT)
Dept: RADIOLOGY | Facility: HOSPITAL | Age: 68
Discharge: HOME | End: 2023-06-30
Attending: STUDENT IN AN ORGANIZED HEALTH CARE EDUCATION/TRAINING PROGRAM
Payer: MEDICARE

## 2023-06-30 DIAGNOSIS — S80.01XA CONTUSION OF RIGHT KNEE, INITIAL ENCOUNTER: ICD-10-CM

## 2023-06-30 DIAGNOSIS — M17.11 PRIMARY OSTEOARTHRITIS OF RIGHT KNEE: ICD-10-CM

## 2023-06-30 PROCEDURE — 73721 MRI JNT OF LWR EXTRE W/O DYE: CPT | Mod: 26,HCNC,RT, | Performed by: RADIOLOGY

## 2023-06-30 PROCEDURE — 73721 MRI KNEE WITHOUT CONTRAST RIGHT: ICD-10-PCS | Mod: 26,HCNC,RT, | Performed by: RADIOLOGY

## 2023-06-30 PROCEDURE — 73721 MRI JNT OF LWR EXTRE W/O DYE: CPT | Mod: TC,HCNC,RT

## 2023-07-03 ENCOUNTER — OFFICE VISIT (OUTPATIENT)
Dept: SPORTS MEDICINE | Facility: CLINIC | Age: 68
End: 2023-07-03
Payer: MEDICARE

## 2023-07-03 VITALS
SYSTOLIC BLOOD PRESSURE: 121 MMHG | DIASTOLIC BLOOD PRESSURE: 75 MMHG | WEIGHT: 166 LBS | HEART RATE: 76 BPM | BODY MASS INDEX: 27.66 KG/M2 | HEIGHT: 65 IN

## 2023-07-03 DIAGNOSIS — M23.91 INTERNAL DERANGEMENT OF RIGHT KNEE: Primary | ICD-10-CM

## 2023-07-03 DIAGNOSIS — M25.461 EFFUSION OF RIGHT KNEE: ICD-10-CM

## 2023-07-03 DIAGNOSIS — S83.411D TEAR OF MEDIAL COLLATERAL LIGAMENT OF RIGHT KNEE, SUBSEQUENT ENCOUNTER: ICD-10-CM

## 2023-07-03 DIAGNOSIS — M23.205 OLD TEAR OF MEDIAL MENISCUS: ICD-10-CM

## 2023-07-03 DIAGNOSIS — M23.200 OLD COMPLEX TEAR OF LATERAL MENISCUS OF RIGHT KNEE: ICD-10-CM

## 2023-07-03 PROCEDURE — 3008F PR BODY MASS INDEX (BMI) DOCUMENTED: ICD-10-PCS | Mod: HCNC,CPTII,S$GLB, | Performed by: PHYSICIAN ASSISTANT

## 2023-07-03 PROCEDURE — 1160F RVW MEDS BY RX/DR IN RCRD: CPT | Mod: HCNC,CPTII,S$GLB, | Performed by: PHYSICIAN ASSISTANT

## 2023-07-03 PROCEDURE — 3074F PR MOST RECENT SYSTOLIC BLOOD PRESSURE < 130 MM HG: ICD-10-PCS | Mod: HCNC,CPTII,S$GLB, | Performed by: PHYSICIAN ASSISTANT

## 2023-07-03 PROCEDURE — 3044F HG A1C LEVEL LT 7.0%: CPT | Mod: HCNC,CPTII,S$GLB, | Performed by: PHYSICIAN ASSISTANT

## 2023-07-03 PROCEDURE — 99999 PR PBB SHADOW E&M-EST. PATIENT-LVL III: CPT | Mod: PBBFAC,HCNC,, | Performed by: PHYSICIAN ASSISTANT

## 2023-07-03 PROCEDURE — 3044F PR MOST RECENT HEMOGLOBIN A1C LEVEL <7.0%: ICD-10-PCS | Mod: HCNC,CPTII,S$GLB, | Performed by: PHYSICIAN ASSISTANT

## 2023-07-03 PROCEDURE — 3078F DIAST BP <80 MM HG: CPT | Mod: HCNC,CPTII,S$GLB, | Performed by: PHYSICIAN ASSISTANT

## 2023-07-03 PROCEDURE — 99214 PR OFFICE/OUTPT VISIT, EST, LEVL IV, 30-39 MIN: ICD-10-PCS | Mod: 25,HCNC,S$GLB, | Performed by: PHYSICIAN ASSISTANT

## 2023-07-03 PROCEDURE — 3074F SYST BP LT 130 MM HG: CPT | Mod: HCNC,CPTII,S$GLB, | Performed by: PHYSICIAN ASSISTANT

## 2023-07-03 PROCEDURE — 99999 PR PBB SHADOW E&M-EST. PATIENT-LVL III: ICD-10-PCS | Mod: PBBFAC,HCNC,, | Performed by: PHYSICIAN ASSISTANT

## 2023-07-03 PROCEDURE — 1160F PR REVIEW ALL MEDS BY PRESCRIBER/CLIN PHARMACIST DOCUMENTED: ICD-10-PCS | Mod: HCNC,CPTII,S$GLB, | Performed by: PHYSICIAN ASSISTANT

## 2023-07-03 PROCEDURE — 99214 OFFICE O/P EST MOD 30 MIN: CPT | Mod: 25,HCNC,S$GLB, | Performed by: PHYSICIAN ASSISTANT

## 2023-07-03 PROCEDURE — 1159F PR MEDICATION LIST DOCUMENTED IN MEDICAL RECORD: ICD-10-PCS | Mod: HCNC,CPTII,S$GLB, | Performed by: PHYSICIAN ASSISTANT

## 2023-07-03 PROCEDURE — 1125F PR PAIN SEVERITY QUANTIFIED, PAIN PRESENT: ICD-10-PCS | Mod: HCNC,CPTII,S$GLB, | Performed by: PHYSICIAN ASSISTANT

## 2023-07-03 PROCEDURE — 3078F PR MOST RECENT DIASTOLIC BLOOD PRESSURE < 80 MM HG: ICD-10-PCS | Mod: HCNC,CPTII,S$GLB, | Performed by: PHYSICIAN ASSISTANT

## 2023-07-03 PROCEDURE — 1159F MED LIST DOCD IN RCRD: CPT | Mod: HCNC,CPTII,S$GLB, | Performed by: PHYSICIAN ASSISTANT

## 2023-07-03 PROCEDURE — 1125F AMNT PAIN NOTED PAIN PRSNT: CPT | Mod: HCNC,CPTII,S$GLB, | Performed by: PHYSICIAN ASSISTANT

## 2023-07-03 PROCEDURE — 3008F BODY MASS INDEX DOCD: CPT | Mod: HCNC,CPTII,S$GLB, | Performed by: PHYSICIAN ASSISTANT

## 2023-07-03 RX ORDER — METHYLPREDNISOLONE 4 MG/1
TABLET ORAL
Qty: 21 EACH | Refills: 0 | Status: SHIPPED | OUTPATIENT
Start: 2023-07-03 | End: 2023-07-13

## 2023-07-03 NOTE — PROGRESS NOTES
CC: Right knee pain    DATE OF PROCEDURE: 4/1/2022   PROCEDURE PERFORMED:   Left knee arthroscopic partial lateral meniscectomy (CPT 16793)      Left knee arthroscopic chondroplasty   Left knee arthroscopic partial synovectomy    Interval:  Patient presenting today for review of MRI results.  used for this visit. Says her pain has mildly improved since her previous visit. She is wearing the brace and still has significant swelling. She is able to bear weight but she does still have 7/10 with this. She denies mechanical symptoms. Has not yet heard from PT to scheduled.    67 y.o. who returns to see me with a new complaint.  She fell this past Saturday on her flexed right knee and was unable to bear much weight afterwards.  She was seen in the emergency department.  X-rays at that time were negative.  She was given a knee immobilizer and told to follow up with Orthopedics.  She presents to clinic today with her .  She is partially English speaking.  We had the assistance of a  today as well (#[id number]).  She has been ambulating with a walker.  Unable to bear much weight on the right lower extremity.  Tylenol and anti-inflammatory medication as needed.  Has included ice.  Localizes pain anteromedially.    REVIEW OF SYSTEMS:   Constitution: Negative. Negative for chills, fever and night sweats.    Hematologic/Lymphatic: Negative for bleeding problem. Does not bruise/bleed easily.   Skin: Negative for dry skin, itching and rash.   Musculoskeletal: Negative for falls. Positive for right knee pain and muscle weakness.     All other review of symptoms were reviewed and found to be noncontributory.     PAST MEDICAL HISTORY:   Past Medical History:   Diagnosis Date    Hyperlipidemia      PAST SURGICAL HISTORY:   Past Surgical History:   Procedure Laterality Date    ARTHROSCOPIC CHONDROPLASTY OF KNEE JOINT Left 4/1/2022    Procedure: ARTHROSCOPY, KNEE, WITH CHONDROPLASTY;  Surgeon: JEANINE Fuentes,  MD;  Location: [facility] OR;  Service: Orthopedics;  Laterality: Left;    HYSTERECTOMY      KNEE ARTHROSCOPY W/ MENISCECTOMY Left 4/1/2022    Procedure: ARTHROSCOPY, KNEE, WITH MENISCECTOMY partial lateral;  Surgeon: JEANINE Fuentes MD;  Location: [facility];  Service: Orthopedics;  Laterality: Left;  regional with catheter- adductor  pericapsular injection: 30cc MILTON     FAMILY HISTORY:   History reviewed. No pertinent family history.    SOCIAL HISTORY:   Social History     Socioeconomic History    Marital status:    Tobacco Use    Smoking status: Never    Smokeless tobacco: Never   Substance and Sexual Activity    Alcohol use: No    Drug use: No     Social Determinants of Health     Financial Resource Strain: Low Risk     Difficulty of Paying Living Expenses: Not hard at all   Food Insecurity: No Food Insecurity    Worried About Running Out of Food in the Last Year: Never true    Ran Out of Food in the Last Year: Never true   Transportation Needs: No Transportation Needs    Lack of Transportation (Medical): No    Lack of Transportation (Non-Medical): No   Physical Activity: Inactive    Days of Exercise per Week: 0 days    Minutes of Exercise per Session: 0 min   Stress: No Stress Concern Present    Feeling of Stress : Not at all   Social Connections: Moderately Isolated    Frequency of Communication with Friends and Family: More than three times a week    Frequency of Social Gatherings with Friends and Family: More than three times a week    Attends Adventist Services: Never    Active Member of Clubs or Organizations: No    Attends Club or Organization Meetings: Never    Marital Status:    Housing Stability: Low Risk     Unable to Pay for Housing in the Last Year: No    Number of Places Lived in the Last Year: 1    Unstable Housing in the Last Year: No     MEDICATIONS:     Current Outpatient Medications:     atorvastatin (LIPITOR) 20 MG tablet, Take 1 tablet (20 mg total) by mouth once daily., Disp: 90  "tablet, Rfl: 3    naproxen (NAPROSYN) 500 MG tablet, Take 1 tablet (500 mg total) by mouth 2 (two) times daily with meals., Disp: 60 tablet, Rfl: 1    triamcinolone acetonide 0.1% (KENALOG) 0.1 % Lotn, aaa qd- bid prn flare. Not more than 2 weeks straight in the same location, Disp: 60 mL, Rfl: 3    methylPREDNISolone (MEDROL DOSEPACK) 4 mg tablet, use as directed, Disp: 21 each, Rfl: 0    ALLERGIES:   Review of patient's allergies indicates:  No Known Allergies     PHYSICAL EXAMINATION:  /75   Pulse 76   Ht 5' 5" (1.651 m)   Wt 75.3 kg (166 lb)   BMI 27.62 kg/m²   General: Well-developed well-nourished 67 y.o. femalein no acute distress   Cardiovascular: Regular rhythm by palpation of distal pulse, normal color and temperature, no concerning varicosities on symptomatic side   Lungs: No labored breathing or wheezing appreciated   Neuro: Alert and oriented ×3   Psychiatric: well oriented to person, place and time, demonstrates normal mood and affect   Skin: No rashes, lesions or ulcers, normal temperature, turgor, and texture on involved extremity    Ortho/SPM Exam  Examination of the right knee diffuse soft tissue swelling around the knee with some ecchymosis anteromedially.  Diffuse associated pain to palpation.  2+ effusion.  Able to obtain full active extension. Active assisted flexion to 100° with some pain.  Generalized tenderness to palpation both medially and laterally.  Stable to varus and valgus stress.    IMAGING:  X-rays including standing, weight bearing AP and flexion bilateral knees, RIGHT knee lateral and sunrise views ordered and images reviewed by me show:    No fracture seen.  She does have some tricompartmental degenerative changes with medial joint space narrowing.    MRI of the left knee:  FINDINGS:  There is a tear of the proximal attachment of the ACL.  The PCL is intact.  Quadriceps and patellar tendons are unremarkable.  There is a tear of the proximal attachment of the MCL.  LCL " is grossly intact.  There is a joint effusion.  There is bone contusion of the posterolateral tibial plateau.  There is mild DJD.  Patella, is cartilage and retinacula are intact.  There is a joint effusion.  There is soft tissue edema.  There is a chronic tear of the body and posterior horn of the medial meniscus.  There is a tear of the body and posterior horn of the lateral meniscus and the posterior horn of the lateral meniscus has flipped anteriorly on top of the anterior horn.    ASSESSMENT:      ICD-10-CM ICD-9-CM   1. Internal derangement of right knee  M23.91 717.9   2. Effusion of right knee  M25.461 719.06   3. Tear of medial collateral ligament of right knee, subsequent encounter  S83.411D V58.89     844.1   4. Old tear of medial meniscus  M23.205 836.0   5. Old complex tear of lateral meniscus of right knee  M23.200 717.40       PLAN:     Findings discussed with the patient.  She has some underlying DJD and findings consistent with knee contusion. MRI was ordered to rule out occult fracture- she does not have one. Her pain is mildly improving. MRI also demonstrating both medial and lateral meniscus tears, likely chronic in nature. Also evidence of MCL injury- patient denies subjective instability today on exam. We will likely treat this conservatively to start. With that being said patient has an effusion today on exam likely due to a flare up of her chronic injuries/DJD. She does not have any mechanical symptoms. We will continue with plan for PT. In this setting I would normally perform CSI but we will hold off on this in case she ends up needing operative treatment. Will perform joint aspiration today in clinic and start a Medrol dose pack.  Continue with weight-bearing to tolerance. Continue bracing.  Continue ice and oral medication for relief.  Follow up in 3 weeks after swelling/inflammation goes down and she has done some PT.     Aspiration Procedure  A time out was performed, including  verification of patient ID, procedure, site and side, availability of information and equipment, review of safety issues, and agreement with consent, the procedure site was marked.    After time out was performed, the patient was prepped aseptically with povidone-iodine swabsticks. Approximately 10 cc of 1% lidocaine plain was injected with a 25-gauge needle into the aspiration site without difficulty.  80cc's of normal joint fluid were aspirated from the Superolateral  aspect of the right Knee Joint using an 18g x 1.5 needle in sterile fashion without complication. Bandage was applied.     Celina Azevedo was reminded to rest with RICE for 48 hours post injection and to call the clinic immediately for any adverse side effects as explained in clinic today.

## 2023-07-13 ENCOUNTER — OFFICE VISIT (OUTPATIENT)
Dept: INTERNAL MEDICINE | Facility: CLINIC | Age: 68
End: 2023-07-13
Payer: MEDICARE

## 2023-07-13 VITALS
HEIGHT: 65 IN | DIASTOLIC BLOOD PRESSURE: 70 MMHG | WEIGHT: 169.88 LBS | SYSTOLIC BLOOD PRESSURE: 118 MMHG | HEART RATE: 78 BPM | BODY MASS INDEX: 28.3 KG/M2

## 2023-07-13 DIAGNOSIS — G89.29 CHRONIC PAIN OF RIGHT KNEE: Primary | ICD-10-CM

## 2023-07-13 DIAGNOSIS — M25.561 CHRONIC PAIN OF RIGHT KNEE: Primary | ICD-10-CM

## 2023-07-13 DIAGNOSIS — R73.03 PREDIABETES: ICD-10-CM

## 2023-07-13 PROCEDURE — 1160F RVW MEDS BY RX/DR IN RCRD: CPT | Mod: HCNC,CPTII,S$GLB, | Performed by: STUDENT IN AN ORGANIZED HEALTH CARE EDUCATION/TRAINING PROGRAM

## 2023-07-13 PROCEDURE — 3288F FALL RISK ASSESSMENT DOCD: CPT | Mod: HCNC,CPTII,S$GLB, | Performed by: STUDENT IN AN ORGANIZED HEALTH CARE EDUCATION/TRAINING PROGRAM

## 2023-07-13 PROCEDURE — 99214 PR OFFICE/OUTPT VISIT, EST, LEVL IV, 30-39 MIN: ICD-10-PCS | Mod: HCNC,S$GLB,, | Performed by: STUDENT IN AN ORGANIZED HEALTH CARE EDUCATION/TRAINING PROGRAM

## 2023-07-13 PROCEDURE — 99214 OFFICE O/P EST MOD 30 MIN: CPT | Mod: HCNC,S$GLB,, | Performed by: STUDENT IN AN ORGANIZED HEALTH CARE EDUCATION/TRAINING PROGRAM

## 2023-07-13 PROCEDURE — 3078F DIAST BP <80 MM HG: CPT | Mod: HCNC,CPTII,S$GLB, | Performed by: STUDENT IN AN ORGANIZED HEALTH CARE EDUCATION/TRAINING PROGRAM

## 2023-07-13 PROCEDURE — 1101F PT FALLS ASSESS-DOCD LE1/YR: CPT | Mod: HCNC,CPTII,S$GLB, | Performed by: STUDENT IN AN ORGANIZED HEALTH CARE EDUCATION/TRAINING PROGRAM

## 2023-07-13 PROCEDURE — 3078F PR MOST RECENT DIASTOLIC BLOOD PRESSURE < 80 MM HG: ICD-10-PCS | Mod: HCNC,CPTII,S$GLB, | Performed by: STUDENT IN AN ORGANIZED HEALTH CARE EDUCATION/TRAINING PROGRAM

## 2023-07-13 PROCEDURE — 1126F AMNT PAIN NOTED NONE PRSNT: CPT | Mod: HCNC,CPTII,S$GLB, | Performed by: STUDENT IN AN ORGANIZED HEALTH CARE EDUCATION/TRAINING PROGRAM

## 2023-07-13 PROCEDURE — 3074F SYST BP LT 130 MM HG: CPT | Mod: HCNC,CPTII,S$GLB, | Performed by: STUDENT IN AN ORGANIZED HEALTH CARE EDUCATION/TRAINING PROGRAM

## 2023-07-13 PROCEDURE — 99999 PR PBB SHADOW E&M-EST. PATIENT-LVL III: ICD-10-PCS | Mod: PBBFAC,HCNC,, | Performed by: STUDENT IN AN ORGANIZED HEALTH CARE EDUCATION/TRAINING PROGRAM

## 2023-07-13 PROCEDURE — 3288F PR FALLS RISK ASSESSMENT DOCUMENTED: ICD-10-PCS | Mod: HCNC,CPTII,S$GLB, | Performed by: STUDENT IN AN ORGANIZED HEALTH CARE EDUCATION/TRAINING PROGRAM

## 2023-07-13 PROCEDURE — 1159F PR MEDICATION LIST DOCUMENTED IN MEDICAL RECORD: ICD-10-PCS | Mod: HCNC,CPTII,S$GLB, | Performed by: STUDENT IN AN ORGANIZED HEALTH CARE EDUCATION/TRAINING PROGRAM

## 2023-07-13 PROCEDURE — 1159F MED LIST DOCD IN RCRD: CPT | Mod: HCNC,CPTII,S$GLB, | Performed by: STUDENT IN AN ORGANIZED HEALTH CARE EDUCATION/TRAINING PROGRAM

## 2023-07-13 PROCEDURE — 3008F BODY MASS INDEX DOCD: CPT | Mod: HCNC,CPTII,S$GLB, | Performed by: STUDENT IN AN ORGANIZED HEALTH CARE EDUCATION/TRAINING PROGRAM

## 2023-07-13 PROCEDURE — 1160F PR REVIEW ALL MEDS BY PRESCRIBER/CLIN PHARMACIST DOCUMENTED: ICD-10-PCS | Mod: HCNC,CPTII,S$GLB, | Performed by: STUDENT IN AN ORGANIZED HEALTH CARE EDUCATION/TRAINING PROGRAM

## 2023-07-13 PROCEDURE — 99999 PR PBB SHADOW E&M-EST. PATIENT-LVL III: CPT | Mod: PBBFAC,HCNC,, | Performed by: STUDENT IN AN ORGANIZED HEALTH CARE EDUCATION/TRAINING PROGRAM

## 2023-07-13 PROCEDURE — 1101F PR PT FALLS ASSESS DOC 0-1 FALLS W/OUT INJ PAST YR: ICD-10-PCS | Mod: HCNC,CPTII,S$GLB, | Performed by: STUDENT IN AN ORGANIZED HEALTH CARE EDUCATION/TRAINING PROGRAM

## 2023-07-13 PROCEDURE — 3074F PR MOST RECENT SYSTOLIC BLOOD PRESSURE < 130 MM HG: ICD-10-PCS | Mod: HCNC,CPTII,S$GLB, | Performed by: STUDENT IN AN ORGANIZED HEALTH CARE EDUCATION/TRAINING PROGRAM

## 2023-07-13 PROCEDURE — 3008F PR BODY MASS INDEX (BMI) DOCUMENTED: ICD-10-PCS | Mod: HCNC,CPTII,S$GLB, | Performed by: STUDENT IN AN ORGANIZED HEALTH CARE EDUCATION/TRAINING PROGRAM

## 2023-07-13 PROCEDURE — 3044F PR MOST RECENT HEMOGLOBIN A1C LEVEL <7.0%: ICD-10-PCS | Mod: HCNC,CPTII,S$GLB, | Performed by: STUDENT IN AN ORGANIZED HEALTH CARE EDUCATION/TRAINING PROGRAM

## 2023-07-13 PROCEDURE — 3044F HG A1C LEVEL LT 7.0%: CPT | Mod: HCNC,CPTII,S$GLB, | Performed by: STUDENT IN AN ORGANIZED HEALTH CARE EDUCATION/TRAINING PROGRAM

## 2023-07-13 PROCEDURE — 1126F PR PAIN SEVERITY QUANTIFIED, NO PAIN PRESENT: ICD-10-PCS | Mod: HCNC,CPTII,S$GLB, | Performed by: STUDENT IN AN ORGANIZED HEALTH CARE EDUCATION/TRAINING PROGRAM

## 2023-07-13 RX ORDER — CELECOXIB 200 MG/1
200 CAPSULE ORAL 2 TIMES DAILY PRN
Qty: 30 CAPSULE | Refills: 0 | Status: SHIPPED | OUTPATIENT
Start: 2023-07-13 | End: 2023-07-27

## 2023-07-13 NOTE — PROGRESS NOTES
Subjective:       Patient ID: Celina Azevedo is a 67 y.o. female.    Chief Complaint: Fall (Fell 2 wks)    Fall      Celina Azevedo is a 67 y.o. female , French speaking, with a history of:  Menopause  OA knee  Osteopenia  S/P left knee arthroscopy  Prediabetes  HLD    Patient comes to the clinic complaining of knee pain and swelling.      Patient had a ground level fall on 06/21/2023, when she was sweeping the sidewalk in front of her house and fell over her right knee, she presented right knee trauma, attended the ER, received initial treatment for the fall with x-ray and MRI.  She then followed up with sports Medicine on 06/29/23, she was found to have a right knee effusion, which was drained in the office, an MRI was ordered.  She was also started on NSAIDs and she was prescribed a Medrol pack which she picked up and already completed.    She comes today because she has persistent pain and swelling of the right knee, she says she has not had any improvement.      She was ordered physical therapy sessions which he has not started them yet.    She has a scheduled follow-up appointment with sports medicine in 2 weeks.    She was prescribed naproxen but she says that her stomach gets upset when she takes it.    MRI Knee Without Contrast Right  Narrative: EXAMINATION:  MRI KNEE WITHOUT CONTRAST RIGHT    CLINICAL HISTORY:  Knee trauma, internal derangement suspected, xray done;  Unilateral primary osteoarthritis, right knee    FINDINGS:  There is a tear of the proximal attachment of the ACL.  The PCL is intact.  Quadriceps and patellar tendons are unremarkable.  There is a tear of the proximal attachment of the MCL.  LCL is grossly intact.  There is a joint effusion.  There is bone contusion of the posterolateral tibial plateau.  There is mild DJD.  Patella, is cartilage and retinacula are intact.  There is a joint effusion.  There is soft tissue edema.  There is a chronic tear of the body and posterior horn of the  "medial meniscus.  There is a tear of the body and posterior horn of the lateral meniscus and the posterior horn of the lateral meniscus has flipped anteriorly on top of the anterior horn.  Impression: Internal derangement as above.    Electronically signed by: Vel Gonzalez MD  Date:    06/30/2023  Time:    14:14        Healthcare Maintenance:  Colonoscopy: ordered  Vaccinations: Pneumonia 2022, Zoster, Tetanus  COVID vaccination: completed  Depression screening: PHQ2 score = 0     Annual Wellness visit approx. Month: January ROS  11-point review of systems done. Negative except for detailed in the HPI.        Objective:      Vitals:    07/13/23 1016   BP: 118/70   Pulse: 78   Weight: 77.1 kg (169 lb 14.4 oz)   Height: 5' 5" (1.651 m)         Physical Exam  Vitals and nursing note reviewed.   Constitutional:       Appearance: Normal appearance.   HENT:      Head: Normocephalic and atraumatic.      Right Ear: Tympanic membrane normal.      Left Ear: Tympanic membrane normal.      Nose: Nose normal.      Mouth/Throat:      Mouth: Mucous membranes are moist.      Pharynx: Oropharynx is clear.   Eyes:      Extraocular Movements: Extraocular movements intact.      Conjunctiva/sclera: Conjunctivae normal.      Pupils: Pupils are equal, round, and reactive to light.   Cardiovascular:      Rate and Rhythm: Normal rate and regular rhythm.      Pulses: Normal pulses.      Heart sounds: Normal heart sounds.   Pulmonary:      Effort: Pulmonary effort is normal.      Breath sounds: Normal breath sounds.   Abdominal:      General: Bowel sounds are normal. There is no distension.      Palpations: Abdomen is soft.      Tenderness: There is no abdominal tenderness.   Musculoskeletal:         General: Normal range of motion.      Cervical back: Normal range of motion and neck supple.      Right knee: Swelling and effusion present.   Skin:     General: Skin is warm.   Neurological:      General: No focal deficit present.      " Mental Status: She is alert and oriented to person, place, and time. Mental status is at baseline.   Psychiatric:         Mood and Affect: Mood normal.          Assessment:       1. Chronic pain of right knee  Comments:  Persistent pain and swelling of her right knee. Will add celebrex to her current anti inflammatory treatment. Start PT, F/U with sports medicine  Orders:  -     celecoxib (CELEBREX) 200 MG capsule; Take 1 capsule (200 mg total) by mouth 2 (two) times daily as needed for Pain.  Dispense: 30 capsule; Refill: 0    2. Prediabetes  -     Hemoglobin A1C; Future; Expected date: 10/13/2023  -     Comprehensive Metabolic Panel; Future; Expected date: 10/13/2023       Plan:       Celebrex ordered.    Schedule physical therapy sessions.    Follow with sports Medicine as scheduled.      Education provided  Lifestyle recommendations given  AVS printed, explained, and given to the patient.  RTC in : October for pre-diabetes wit A1C, CMP, labs ordered.            TRICIA BUNDY MD, MPH  Internal Medicine  [facility]  [facility]

## 2023-07-20 ENCOUNTER — OFFICE VISIT (OUTPATIENT)
Dept: SPORTS MEDICINE | Facility: CLINIC | Age: 68
End: 2023-07-20
Payer: MEDICARE

## 2023-07-20 VITALS
WEIGHT: 169.75 LBS | BODY MASS INDEX: 28.28 KG/M2 | SYSTOLIC BLOOD PRESSURE: 128 MMHG | HEART RATE: 76 BPM | HEIGHT: 65 IN | DIASTOLIC BLOOD PRESSURE: 79 MMHG

## 2023-07-20 DIAGNOSIS — M23.232 DERANG OF MEDIAL MENISCUS DUE TO OLD TEAR/INJ, LEFT KNEE: ICD-10-CM

## 2023-07-20 DIAGNOSIS — M17.11 PRIMARY OSTEOARTHRITIS OF RIGHT KNEE: ICD-10-CM

## 2023-07-20 DIAGNOSIS — M23.200 OLD COMPLEX TEAR OF LATERAL MENISCUS OF RIGHT KNEE: Primary | ICD-10-CM

## 2023-07-20 DIAGNOSIS — M25.461 EFFUSION OF RIGHT KNEE: ICD-10-CM

## 2023-07-20 PROCEDURE — 1125F AMNT PAIN NOTED PAIN PRSNT: CPT | Mod: HCNC,CPTII,S$GLB, | Performed by: ORTHOPAEDIC SURGERY

## 2023-07-20 PROCEDURE — 99214 OFFICE O/P EST MOD 30 MIN: CPT | Mod: 25,HCNC,S$GLB, | Performed by: ORTHOPAEDIC SURGERY

## 2023-07-20 PROCEDURE — 20610 DRAIN/INJ JOINT/BURSA W/O US: CPT | Mod: HCNC,RT,S$GLB, | Performed by: ORTHOPAEDIC SURGERY

## 2023-07-20 PROCEDURE — 3074F SYST BP LT 130 MM HG: CPT | Mod: HCNC,CPTII,S$GLB, | Performed by: ORTHOPAEDIC SURGERY

## 2023-07-20 PROCEDURE — 3044F PR MOST RECENT HEMOGLOBIN A1C LEVEL <7.0%: ICD-10-PCS | Mod: HCNC,CPTII,S$GLB, | Performed by: ORTHOPAEDIC SURGERY

## 2023-07-20 PROCEDURE — 3078F DIAST BP <80 MM HG: CPT | Mod: HCNC,CPTII,S$GLB, | Performed by: ORTHOPAEDIC SURGERY

## 2023-07-20 PROCEDURE — 99214 PR OFFICE/OUTPT VISIT, EST, LEVL IV, 30-39 MIN: ICD-10-PCS | Mod: 25,HCNC,S$GLB, | Performed by: ORTHOPAEDIC SURGERY

## 2023-07-20 PROCEDURE — 3008F PR BODY MASS INDEX (BMI) DOCUMENTED: ICD-10-PCS | Mod: HCNC,CPTII,S$GLB, | Performed by: ORTHOPAEDIC SURGERY

## 2023-07-20 PROCEDURE — 3044F HG A1C LEVEL LT 7.0%: CPT | Mod: HCNC,CPTII,S$GLB, | Performed by: ORTHOPAEDIC SURGERY

## 2023-07-20 PROCEDURE — 3078F PR MOST RECENT DIASTOLIC BLOOD PRESSURE < 80 MM HG: ICD-10-PCS | Mod: HCNC,CPTII,S$GLB, | Performed by: ORTHOPAEDIC SURGERY

## 2023-07-20 PROCEDURE — 3008F BODY MASS INDEX DOCD: CPT | Mod: HCNC,CPTII,S$GLB, | Performed by: ORTHOPAEDIC SURGERY

## 2023-07-20 PROCEDURE — 99999 PR PBB SHADOW E&M-EST. PATIENT-LVL II: CPT | Mod: PBBFAC,HCNC,, | Performed by: ORTHOPAEDIC SURGERY

## 2023-07-20 PROCEDURE — 20610 LARGE JOINT ASPIRATION/INJECTION: R KNEE: ICD-10-PCS | Mod: HCNC,RT,S$GLB, | Performed by: ORTHOPAEDIC SURGERY

## 2023-07-20 PROCEDURE — 1125F PR PAIN SEVERITY QUANTIFIED, PAIN PRESENT: ICD-10-PCS | Mod: HCNC,CPTII,S$GLB, | Performed by: ORTHOPAEDIC SURGERY

## 2023-07-20 PROCEDURE — 3074F PR MOST RECENT SYSTOLIC BLOOD PRESSURE < 130 MM HG: ICD-10-PCS | Mod: HCNC,CPTII,S$GLB, | Performed by: ORTHOPAEDIC SURGERY

## 2023-07-20 PROCEDURE — 99999 PR PBB SHADOW E&M-EST. PATIENT-LVL II: ICD-10-PCS | Mod: PBBFAC,HCNC,, | Performed by: ORTHOPAEDIC SURGERY

## 2023-07-20 RX ORDER — TRIAMCINOLONE ACETONIDE 40 MG/ML
40 INJECTION, SUSPENSION INTRA-ARTICULAR; INTRAMUSCULAR
Status: DISCONTINUED | OUTPATIENT
Start: 2023-07-20 | End: 2023-07-20 | Stop reason: HOSPADM

## 2023-07-20 RX ADMIN — TRIAMCINOLONE ACETONIDE 40 MG: 40 INJECTION, SUSPENSION INTRA-ARTICULAR; INTRAMUSCULAR at 02:07

## 2023-07-20 NOTE — PROGRESS NOTES
CC: Right knee pain    DATE OF PROCEDURE: 4/1/2022   PROCEDURE PERFORMED:   Left knee arthroscopic partial lateral meniscectomy (CPT 71830)      Left knee arthroscopic chondroplasty   Left knee arthroscopic partial synovectomy    Celina who presents for MRI review of right knee.  She has had some increased pain after a fall.  At last visit she had needle aspiration for effusion and has taken some oral medication.  Feeling better but still has pain.    Prior Hx 6/30/23:  67 y.o. who returns to see me with a new complaint.  She fell this past Saturday on her flexed right knee and was unable to bear much weight afterwards.  She was seen in the emergency department.  X-rays at that time were negative.  She was given a knee immobilizer and told to follow up with Orthopedics.  She presents to clinic today with her .  She is partially English speaking.  We had the assistance of a  today as well (#[id number]).  She has been ambulating with a walker.  Unable to bear much weight on the right lower extremity.  Tylenol and anti-inflammatory medication as needed.  Has included ice.  Localizes pain anteromedially.    REVIEW OF SYSTEMS:   Constitution: Negative. Negative for chills, fever and night sweats.    Hematologic/Lymphatic: Negative for bleeding problem. Does not bruise/bleed easily.   Skin: Negative for dry skin, itching and rash.   Musculoskeletal: Negative for falls. Positive for right knee pain and muscle weakness.     All other review of symptoms were reviewed and found to be noncontributory.     PAST MEDICAL HISTORY:   Past Medical History:   Diagnosis Date    Hyperlipidemia      PAST SURGICAL HISTORY:   Past Surgical History:   Procedure Laterality Date    ARTHROSCOPIC CHONDROPLASTY OF KNEE JOINT Left 4/1/2022    Procedure: ARTHROSCOPY, KNEE, WITH CHONDROPLASTY;  Surgeon: JEANINE Fuentes MD;  Location: [facility];  Service: Orthopedics;  Laterality: Left;    HYSTERECTOMY      KNEE ARTHROSCOPY W/  MENISCECTOMY Left 4/1/2022    Procedure: ARTHROSCOPY, KNEE, WITH MENISCECTOMY partial lateral;  Surgeon: JEANINE Fuentes MD;  Location: [facility];  Service: Orthopedics;  Laterality: Left;  regional with catheter- adductor  pericapsular injection: 30cc MILTON     FAMILY HISTORY:   History reviewed. No pertinent family history.    SOCIAL HISTORY:   Social History     Socioeconomic History    Marital status:    Tobacco Use    Smoking status: Never    Smokeless tobacco: Never   Substance and Sexual Activity    Alcohol use: No    Drug use: No     Social Determinants of Health     Financial Resource Strain: Low Risk     Difficulty of Paying Living Expenses: Not hard at all   Food Insecurity: No Food Insecurity    Worried About Running Out of Food in the Last Year: Never true    Ran Out of Food in the Last Year: Never true   Transportation Needs: No Transportation Needs    Lack of Transportation (Medical): No    Lack of Transportation (Non-Medical): No   Physical Activity: Inactive    Days of Exercise per Week: 0 days    Minutes of Exercise per Session: 0 min   Stress: No Stress Concern Present    Feeling of Stress : Not at all   Social Connections: Moderately Isolated    Frequency of Communication with Friends and Family: More than three times a week    Frequency of Social Gatherings with Friends and Family: More than three times a week    Attends Ep[employer] Services: Never    Active Member of Clubs or Organizations: No    Attends Club or Organization Meetings: Never    Marital Status:    Housing Stability: Low Risk     Unable to Pay for Housing in the Last Year: No    Number of Places Lived in the Last Year: 1    Unstable Housing in the Last Year: No     MEDICATIONS:     Current Outpatient Medications:     atorvastatin (LIPITOR) 20 MG tablet, Take 1 tablet (20 mg total) by mouth once daily., Disp: 90 tablet, Rfl: 3    naproxen (NAPROSYN) 500 MG tablet, Take 1 tablet (500 mg total) by  "mouth 2 (two) times daily with meals., Disp: 60 tablet, Rfl: 1    triamcinolone acetonide 0.1% (KENALOG) 0.1 % Lotn, aaa qd- bid prn flare. Not more than 2 weeks straight in the same location, Disp: 60 mL, Rfl: 3    celecoxib (CELEBREX) 200 MG capsule, Take 1 capsule (200 mg total) by mouth 2 (two) times daily as needed for Pain. (Patient not taking: Reported on 7/20/2023), Disp: 30 capsule, Rfl: 0    ALLERGIES:   Review of patient's allergies indicates:  No Known Allergies     PHYSICAL EXAMINATION:  /79   Pulse 76   Ht 5' 5" (1.651 m)   Wt 77 kg (169 lb 12.1 oz)   BMI 28.25 kg/m²   General: Well-developed well-nourished 67 y.o. femalein no acute distress   Cardiovascular: Regular rhythm by palpation of distal pulse, normal color and temperature, no concerning varicosities on symptomatic side   Lungs: No labored breathing or wheezing appreciated   Neuro: Alert and oriented ×3   Psychiatric: well oriented to person, place and time, demonstrates normal mood and affect   Skin: No rashes, lesions or ulcers, normal temperature, turgor, and texture on involved extremity    Ortho/SPM Exam  Examination of the right knee demonstrates some trace residual swelling.  Active extension to 5-10° short of full extension.  Flexion to 125°.  She does have some tenderness to palpation over lateral joint line.  Ligamentously stable.  Intact extensor mechanism.    IMAGING:  Prior X-rays including standing, weight bearing AP and flexion bilateral knees, RIGHT knee lateral and sunrise views ordered and images reviewed by me show:    No fracture seen.  She does have some tricompartmental degenerative changes with medial joint space narrowing.    MRI Right Knee 6/30/23:  There is a tear of the proximal attachment of the ACL.  The PCL is intact.  Quadriceps and patellar tendons are unremarkable.  There is a tear of the proximal attachment of the MCL.  LCL is grossly intact.  There is a joint effusion.  There is bone contusion of " the posterolateral tibial plateau.  There is mild DJD.  Patella, is cartilage and retinacula are intact.  There is a joint effusion.  There is soft tissue edema.  There is a chronic tear of the body and posterior horn of the medial meniscus.  There is a tear of the body and posterior horn of the lateral meniscus and the posterior horn of the lateral meniscus has flipped anteriorly on top of the anterior horn.    ASSESSMENT:      ICD-10-CM ICD-9-CM   1. Old complex tear of lateral meniscus of right knee  M23.200 717.40   2. Derang of medial meniscus due to old tear/inj, left knee  M23.232 717.3   3. Primary osteoarthritis of right knee  M17.11 715.16   4. Effusion of right knee  M25.461 719.06       PLAN:     Imaging reviewed with the patient.  Findings of chondromalacia and early arthritic change along with both medial and lateral meniscus tears.  Her lateral meniscus tear is unstable and flipped over on itself from posterior to anterior.  She has a mechanical block on exam.  We discussed arthroscopy to address this.  Certainly we cannot fully address her underlying cartilage damage with the scope.  I do think the arthroscopic partial meniscectomy would help her obviously with her mechanical block.  There was discussed.  Alternatively we reviewed a possible corticosteroid injection to see if we can get some relief.  She prefers the injection.  We will see her back in 2 months to see how she has responded.    Large Joint Aspiration/Injection: R knee    Date/Time: 7/20/2023 2:00 PM  Performed by: JEANINE Fuentes MD  Authorized by: JEANINE Fuentes MD     Consent Done?:  Yes (Verbal)  Indications:  Pain  Site marked: the procedure site was marked    Timeout: prior to procedure the correct patient, procedure, and site was verified    Prep: patient was prepped and draped in usual sterile fashion      Local anesthesia used?: Yes    Local anesthetic:  Co-phenylcaine spray (0.2% Naropin)  Anesthetic total (ml):   4      Details:  Needle Size:  22 G  Ultrasonic Guidance for needle placement?: No    Approach:  Lateral  Location:  Knee  Site:  R knee  Medications:  40 mg triamcinolone acetonide 40 mg/mL  Aspirate amount (mL):  30  Aspirate:  Blood-tinged  Patient tolerance:  Patient tolerated the procedure well with no immediate complications

## 2023-08-14 ENCOUNTER — PES CALL (OUTPATIENT)
Dept: ADMINISTRATIVE | Facility: CLINIC | Age: 68
End: 2023-08-14
Payer: MEDICARE

## 2023-08-15 ENCOUNTER — CLINICAL SUPPORT (OUTPATIENT)
Dept: REHABILITATION | Facility: HOSPITAL | Age: 68
End: 2023-08-15
Payer: MEDICARE

## 2023-08-15 DIAGNOSIS — Z74.09 IMPAIRED FUNCTIONAL MOBILITY AND ACTIVITY TOLERANCE: ICD-10-CM

## 2023-08-15 DIAGNOSIS — M25.661 DECREASED RANGE OF MOTION (ROM) OF RIGHT KNEE: ICD-10-CM

## 2023-08-15 DIAGNOSIS — G89.29 CHRONIC PAIN OF RIGHT KNEE: ICD-10-CM

## 2023-08-15 DIAGNOSIS — M25.561 CHRONIC PAIN OF RIGHT KNEE: ICD-10-CM

## 2023-08-15 PROCEDURE — 97112 NEUROMUSCULAR REEDUCATION: CPT

## 2023-08-15 PROCEDURE — 97161 PT EVAL LOW COMPLEX 20 MIN: CPT

## 2023-08-21 ENCOUNTER — TELEPHONE (OUTPATIENT)
Dept: SPORTS MEDICINE | Facility: CLINIC | Age: 68
End: 2023-08-21
Payer: MEDICARE

## 2023-08-21 PROBLEM — Z00.00 HEALTHCARE MAINTENANCE: Status: RESOLVED | Noted: 2023-01-13 | Resolved: 2023-08-21

## 2023-08-21 NOTE — TELEPHONE ENCOUNTER
Called , and we called the patients home number and work number. Left voicemail for both numbers.

## 2023-08-22 PROBLEM — M25.561 CHRONIC PAIN OF RIGHT KNEE: Status: ACTIVE | Noted: 2023-08-22

## 2023-08-22 PROBLEM — M25.661 DECREASED RANGE OF MOTION (ROM) OF RIGHT KNEE: Status: ACTIVE | Noted: 2023-08-22

## 2023-08-22 PROBLEM — G89.29 CHRONIC PAIN OF RIGHT KNEE: Status: ACTIVE | Noted: 2023-08-22

## 2023-08-24 PROBLEM — Z74.09 IMPAIRED FUNCTIONAL MOBILITY AND ACTIVITY TOLERANCE: Status: ACTIVE | Noted: 2023-08-24

## 2023-08-24 NOTE — PLAN OF CARE
[facility]   Physical Therapy Initial Evaluation      Name: Celina Azevedo  [facility] Number: [id number]    Therapy Diagnosis:   Encounter Diagnoses   Name Primary?    Chronic pain of right knee     Decreased range of motion (ROM) of right knee     Impaired functional mobility and activity tolerance        Physician: vJ Bella, *    Physician Orders: PT Eval and Treat   Medical Diagnosis from Referral:   M17.11 (ICD-10-CM) - Primary osteoarthritis of right knee   S80.01XA (ICD-10-CM) - Contusion of right knee, initial encounter     Evaluation Date: 8/15/2023  Authorization Period Expiration: 6/28/2024  Plan of Care Expiration: 10/27/2023  Progress Note Due: 10th visit  Visit # / Visits authorized: 1/ 1   FOTO: 1/ 3    Precautions: Standard     Time In: 9:00 am  Time Out: 9:57 am  Total Billable Time: 57 minutes    Subjective     Date of onset: chronic, but fell 2 months ago    History of current condition - Celina reports: right knee pain with walking and ambulating stairs. Patient reports increased swelling of right knee; notes MD removed fluid from her knee and gave her a steroid shot. Patient reports she is worried she will need surgery, but notes overall improvement since fluid removal and steroid shot.     Falls: 2 months ago, JOHNNA    Imaging: please see imaging    Prior Therapy: yes  Social History:  lives with their spouse; steps to enter home and kitchen, notes pain with descending stairs  Occupation: unable to work  Prior Level of Function: independent with household ADL's  Current Level of Function: pain with walking, and descending stairs. Reports she is unable to work.     Pain:  Current 7/10, worst 7/10, best 5/10   Location: right knee    Description: Aching and Sharp  Aggravating Factors: Standing, Walking, and stairs  Easing Factors: rest    Patients goals: prevent surgery     Medical History:   Past Medical History:   Diagnosis Date    Hyperlipidemia         Surgical History:   Celina Azevedo  has a past surgical history that includes Hysterectomy; Knee arthroscopy w/ meniscectomy (Left, 4/1/2022); and Arthroscopic chondroplasty of knee joint (Left, 4/1/2022).    Medications:   Celina has a current medication list which includes the following prescription(s): atorvastatin, naproxen, and triamcinolone acetonide 0.1%.    Allergies:   Review of patient's allergies indicates:  No Known Allergies     Objective      Observation: Pt ambulates with bilateral knee flexion; decreased WB through RLE      Strength: manual muscle test grades below      Lower Extremity Strength  Right LE   Left LE     Hip Ext 3+/5 Hip Ext 3+/5    Hip Flexion: 3+/5 Hip Flexion: 3+/5   Hip ER:  3+/5 Hip ER: 3+/5   Hip IR: 3+/5 Hip IR: 3+/5   Hip Abduction: 3+/5  Hip Abduction 3+/5   Hip Adduction (seated) NT/5 Hip Adduction (seated) NT/5   Knee Extension: 3+/5 p! Knee Extension: 4/5   Knee Flexion: 3+/5 p! Knee Flexion: 4/5   Ankle Dorsiflexion: 4/5 Ankle Dorsiflexion: 4/5   Ankle Plantarflexion: 4/5 Ankle Plantarflexion: 4/5      Range of Motion:  R knee Active(Passive) L knee Active (Passive)   Flexion 119 (120) p! flexion 130   Extension 0 (5) extension + 7 (+10)     Joint Mobility:  decreased patella mobility; denies pain with AP and PA tibiofemoral mobilizations    Functional Mobility Assessment:  Double leg Squat: NT  Single leg squat: NT  Step down (6 inch): NT  R forward Lunge onto chair: NT  L forward lunge onto chair: NT  30 second single leg chair rise:NT    Balance Assessment:       Evaluation   Single Limb Stance R LE Pain with SLWB  (<10 sec = HIGH FALL RISK)   Single Limb Stance L LE NT  (<10 sec = HIGH FALL RISK)         Special Tests:  Special Tests:    Right Left   Valgus Stress Test held held   Varus Stress test held held   Lachman's test - -   Posterior Lachman - -   Mikie's Test NT NT   Thessaly's Test NT NT   Patellar Grind Test NT NT         Intake Outcome Measure for  FOTO Knee Survey    Therapist reviewed FOTO scores for Celina Azevedo on 8/15/2023.   FOTO report - see Media section or FOTO account episode details.    Intake Score: please see media section           Treatment     Total Treatment time (time-based codes) separate from Evaluation: 24 minutes     Celina received the treatments listed below:      neuromuscular re-education activities to improve: motor control for 24 minutes. The following activities were included:  Quad sets with towel under knee 5 second, 15x  Quad sets with towel under heel 5 seconds, 15x  Short arc qauds with small bolster 5 seconds, 15x  Long arc quads 5 seconds, 15x  Hamstring stretch with heel prop 30 seconds, 3x to improve quad activation    Patient Education and Home Exercises     Education provided:   - HEP  - prognosis  -plan of care    Written Home Exercises Provided: yes. Exercises were reviewed and Celina was able to demonstrate them prior to the end of the session.  Celina demonstrated fair  understanding of the education provided. See EMR under Patient Instructions for exercises provided during therapy sessions.    Assessment     Celina is a 67 y.o. female referred to outpatient Physical Therapy with a medical diagnosis of Unilateral primary osteoarthritis, right knee - Contusion of right knee, initial encounter . Patient demonstrates decreased lower extremity strength, poor right and left quadriceps motor control and strength, difficulty ambulating short and long distances, and reduced functional mobility. Demonstrates poor tolerance for right knee range of motion and WB. Patient would benefit from skilled outpatient physical therapy to address motor control, strength and range of motion deficits so that she may return to full independence with all household and personal ADL's, and improve overall functional mobility, reduce risk for falls and meet all social and recreational needs.    Patient prognosis is Fair.   Patient  will benefit from skilled outpatient Physical Therapy to address the deficits stated above and in the chart below, provide patient /family education, and to maximize patientt's level of independence.     Plan of care discussed with patient: Yes  Patient's spiritual, cultural and educational needs considered and patient is agreeable to the plan of care and goals as stated below:     Anticipated Barriers for therapy: compliance    Medical Necessity is demonstrated by the following  History  Co-morbidities and personal factors that may impact the plan of care [x] LOW: no personal factors / co-morbidities  [] MODERATE: 1-2 personal factors / co-morbidities  [] HIGH: 3+ personal factors / co-morbidities    Moderate / High Support Documentation:   Co-morbidities affecting plan of care: NA    Personal Factors:   age  coping style     Examination  Body Structures and Functions, activity limitations and participation restrictions that may impact the plan of care [] LOW: addressing 1-2 elements  [] MODERATE: 3+ elements  [x] HIGH: 4+ elements (please support below)    Moderate / High Support Documentation: Limitations with prolonged walking, difficulty with ADLs, decreased range of motion, decreased strength, difficulty with gait, neuromuscular re-education, and pain.       Clinical Presentation [x] LOW: stable  [] MODERATE: Evolving  [] HIGH: Unstable     Decision Making/ Complexity Score: low       Goals:  Short Term Goals (3 Weeks):   1. Patient will be independent with home exercise program to supplement physical therapy treatment in improving functional status.  2. Pt will improve impaired lower extremity manual muscle tests to >/= 4-/5 to improve dynamic bilateral knee support for closed chain tasks.     Long Term Goals (6 Weeks):   1. Pt will improve impaired lower extremity manual muscle tests to >/= 4/5 to improve dynamic bilateral knee support for closed chain tasks.  2. Patient will improve the total FOTO Knee  Survey Score to </= 40% limited to demonstrate increased perceived functional mobility.  4. Patient will perform timed up and go with least restrictive assistive device in < 12 seconds to improve gait speed and safety with community ambulation.  5. Patient will perform at least 10 sit to stands in 30 seconds without UE support to demonstrate increased functional strength.   6. Patient ambulate 1 flight of stairs with <3/10 pain      Plan     Plan of care Certification: 8/15/2023 to 10/27/2023.    Outpatient Physical Therapy 2 times weekly for 10 weeks to include the following interventions: Aquatic Therapy, Cervical/Lumbar Traction, Electrical Stimulation TENS, Gait Training, Manual Therapy, Moist Heat/ Ice, Neuromuscular Re-ed, Orthotic Management and Training, Patient Education, Self Care, Therapeutic Activities, Therapeutic Exercise, and functional dry needling.     Fadumo Shen PT        Physician's Signature: _________________________________________ Date: ________________

## 2023-09-01 ENCOUNTER — CLINICAL SUPPORT (OUTPATIENT)
Dept: REHABILITATION | Facility: HOSPITAL | Age: 68
End: 2023-09-01
Payer: MEDICARE

## 2023-09-01 DIAGNOSIS — M25.661 DECREASED RANGE OF MOTION (ROM) OF RIGHT KNEE: ICD-10-CM

## 2023-09-01 DIAGNOSIS — M25.561 CHRONIC PAIN OF RIGHT KNEE: Primary | ICD-10-CM

## 2023-09-01 DIAGNOSIS — Z74.09 IMPAIRED FUNCTIONAL MOBILITY AND ACTIVITY TOLERANCE: ICD-10-CM

## 2023-09-01 DIAGNOSIS — G89.29 CHRONIC PAIN OF RIGHT KNEE: Primary | ICD-10-CM

## 2023-09-01 PROCEDURE — 97112 NEUROMUSCULAR REEDUCATION: CPT

## 2023-09-01 NOTE — PROGRESS NOTES
"[facility]   Physical Therapy Treatment Note      Name: Celina Azevedo  Clinic Number: [id number]    Therapy Diagnosis:   Encounter Diagnoses   Name Primary?    Chronic pain of right knee Yes    Decreased range of motion (ROM) of right knee     Impaired functional mobility and activity tolerance      Physician: Jv Bella, *    Visit Date: 9/1/2023    Physician Orders: PT Eval and Treat   Medical Diagnosis from Referral:   M17.11 (ICD-10-CM) - Primary osteoarthritis of right knee   S80.01XA (ICD-10-CM) - Contusion of right knee, initial encounter      Evaluation Date: 8/15/2023  Authorization Period Expiration: 6/28/2024  Plan of Care Expiration: 10/27/2023  Progress Note Due: 10th visit  Visit # / Visits authorized: 1/ 1   FOTO: 1/ 3     Precautions: Standard      Time In: 7:00 am  Time Out: 7:57 am  Total Billable Time: 30 minutes    PTA Visit #: 0/5       Subjective     Patient reports: "I feel better." Patient reports she went to [email or web address].   She was compliant with home exercise program.  Response to previous treatment: no adverse effects  Functional change: noting new to note    Pain: NT/10  Location: right knee      Objective      Objective Measures updated at progress report unless specified.     Treatment     Celina received the treatments listed below:      neuromuscular re-education activities to improve: Kinesthetic, Proprioception, and motor control for 57 minutes. The following activities were included:  Quad set 10 seconds, 20x each  Quad sets with towel under knee 5 second, 15x  Quad sets with towel under heel 5 seconds, 15x  Short arc quads with medium bolster 5 seconds, 15x  Short arc qauds with small bolster 5 seconds, 15x  Long arc quads 5 seconds, 15x  SLR: 2x10 - quad lag  Sidelying hip abd - drift into flexion      Patient Education and Home Exercises       Education provided:   - continue HEP    Written Home Exercises Provided: Patient instructed to " cont prior HEP. Exercises were reviewed and Celina was able to demonstrate them prior to the end of the session.  Celina demonstrated good  understanding of the education provided. See Electronic Medical Record under Patient Instructions for exercises provided during therapy sessions    Assessment     Patient with good tolerance for quad motor control activities; routine cues for sets and reps. Continues to demonstrate significant quad lag during SLR.     Celina Is progressing well towards her goals.   Patient prognosis is Fair.     Patient will continue to benefit from skilled outpatient physical therapy to address the deficits listed in the problem list box on initial evaluation, provide pt/family education and to maximize pt's level of independence in the home and community environment.     Patient's spiritual, cultural and educational needs considered and pt agreeable to plan of care and goals.     Anticipated barriers to physical therapy: compliance and chronicity    Goals:   Short Term Goals (3 Weeks):   1. Patient will be independent with home exercise program to supplement physical therapy treatment in improving functional status.  2. Pt will improve impaired lower extremity manual muscle tests to >/= 4-/5 to improve dynamic bilateral knee support for closed chain tasks.     Long Term Goals (6 Weeks):   1. Pt will improve impaired lower extremity manual muscle tests to >/= 4/5 to improve dynamic bilateral knee support for closed chain tasks.  2. Patient will improve the total FOTO Knee Survey Score to </= 40% limited to demonstrate increased perceived functional mobility.  4. Patient will perform timed up and go with least restrictive assistive device in < 12 seconds to improve gait speed and safety with community ambulation.  5. Patient will perform at least 10 sit to stands in 30 seconds without UE support to demonstrate increased functional strength.   6. Patient ambulate 1 flight of stairs with  <3/10 pain    Plan     Plan of care Certification: 8/15/2023 to 10/27/2023.     Outpatient Physical Therapy 2 times weekly for 10 weeks to include the following interventions: Aquatic Therapy, Cervical/Lumbar Traction, Electrical Stimulation TENS, Gait Training, Manual Therapy, Moist Heat/ Ice, Neuromuscular Re-ed, Orthotic Management and Training, Patient Education, Self Care, Therapeutic Activities, Therapeutic Exercise, and functional dry needling.     Fadumo Shen, PT

## 2023-09-08 ENCOUNTER — CLINICAL SUPPORT (OUTPATIENT)
Dept: REHABILITATION | Facility: HOSPITAL | Age: 68
End: 2023-09-08
Payer: MEDICARE

## 2023-09-08 DIAGNOSIS — M25.661 DECREASED RANGE OF MOTION (ROM) OF RIGHT KNEE: ICD-10-CM

## 2023-09-08 DIAGNOSIS — Z74.09 IMPAIRED FUNCTIONAL MOBILITY AND ACTIVITY TOLERANCE: ICD-10-CM

## 2023-09-08 DIAGNOSIS — M25.561 CHRONIC PAIN OF RIGHT KNEE: Primary | ICD-10-CM

## 2023-09-08 DIAGNOSIS — G89.29 CHRONIC PAIN OF RIGHT KNEE: Primary | ICD-10-CM

## 2023-09-08 PROCEDURE — 97112 NEUROMUSCULAR REEDUCATION: CPT

## 2023-09-08 PROCEDURE — 97530 THERAPEUTIC ACTIVITIES: CPT

## 2023-09-08 NOTE — PROGRESS NOTES
"[facility]   Physical Therapy Treatment Note      Name: Celina Azevedo  Clinic Number: [id number]    Therapy Diagnosis:   Encounter Diagnoses   Name Primary?    Chronic pain of right knee Yes    Decreased range of motion (ROM) of right knee     Impaired functional mobility and activity tolerance      Physician: Jv Bella, *    Visit Date: 9/8/2023    Physician Orders: PT Eval and Treat   Medical Diagnosis from Referral:   M17.11 (ICD-10-CM) - Primary osteoarthritis of right knee   S80.01XA (ICD-10-CM) - Contusion of right knee, initial encounter      Evaluation Date: 8/15/2023  Authorization Period Expiration: 6/28/2024  Plan of Care Expiration: 10/27/2023  Progress Note Due: 10th visit  Visit # / Visits authorized: 1/ 1   FOTO: 1/ 3     Precautions: Standard      Time In: 7:08 am  Time Out: 7:55 am  Total treatment time: 47 minutes  Total Billable Time: 23 minutes ( 1 TA 1 NMR)    PTA Visit #: 0/5       Subjective     Patient reports: "I don't think I need the surgery"   She was compliant with home exercise program.  Response to previous treatment: no adverse effects  Functional change: noting new to note    Pain: NT/10  Location: right knee      Objective      Objective Measures updated at progress report unless specified.     Treatment     Celina received the treatments listed below:      neuromuscular re-education activities to improve: Kinesthetic, Proprioception, and motor control for 37 minutes. The following activities were included:  Quad set 10 seconds, 20x each  Quad sets with towel under knee 5 second, 20x  Short arc quads with medium bolster 5 seconds, 20x  Short arc qauds with small bolster 5 seconds, 20x  Long arc quads 5 seconds, 20x  SLR: 2x10 - quad lag  Sidelying hip abd - drift into flexion - not today    THERAPEUTIC ACTIVITIES to improve dynamic and functional performance for 10 minutes including activities below.  Aerobic activity: recumbent bike L3 5 " minutes for AROM, LE and CV endurance    Patient Education and Home Exercises       Education provided:   - continue HEP    Written Home Exercises Provided: Patient instructed to cont prior HEP. Exercises were reviewed and Celina was able to demonstrate them prior to the end of the session.  Celina demonstrated good  understanding of the education provided. See Electronic Medical Record under Patient Instructions for exercises provided during therapy sessions    Assessment     Requires extra encouragement for potential throughout NMR activities. No exacerbation of pain and tolerated all treatment well.     Celina Is progressing well towards her goals.   Patient prognosis is Fair.     Patient will continue to benefit from skilled outpatient physical therapy to address the deficits listed in the problem list box on initial evaluation, provide pt/family education and to maximize pt's level of independence in the home and community environment.     Patient's spiritual, cultural and educational needs considered and pt agreeable to plan of care and goals.     Anticipated barriers to physical therapy: compliance and chronicity    Goals:   Short Term Goals (3 Weeks):   1. Patient will be independent with home exercise program to supplement physical therapy treatment in improving functional status.  2. Pt will improve impaired lower extremity manual muscle tests to >/= 4-/5 to improve dynamic bilateral knee support for closed chain tasks.     Long Term Goals (6 Weeks):   1. Pt will improve impaired lower extremity manual muscle tests to >/= 4/5 to improve dynamic bilateral knee support for closed chain tasks.  2. Patient will improve the total FOTO Knee Survey Score to </= 40% limited to demonstrate increased perceived functional mobility.  4. Patient will perform timed up and go with least restrictive assistive device in < 12 seconds to improve gait speed and safety with community ambulation.  5. Patient will perform  at least 10 sit to stands in 30 seconds without UE support to demonstrate increased functional strength.   6. Patient ambulate 1 flight of stairs with <3/10 pain    Plan     Plan of care Certification: 8/15/2023 to 10/27/2023.     Outpatient Physical Therapy 2 times weekly for 10 weeks to include the following interventions: Aquatic Therapy, Cervical/Lumbar Traction, Electrical Stimulation TENS, Gait Training, Manual Therapy, Moist Heat/ Ice, Neuromuscular Re-ed, Orthotic Management and Training, Patient Education, Self Care, Therapeutic Activities, Therapeutic Exercise, and functional dry needling.     Fadumo Sehn, PT

## 2023-09-11 ENCOUNTER — OFFICE VISIT (OUTPATIENT)
Dept: SPORTS MEDICINE | Facility: CLINIC | Age: 68
End: 2023-09-11
Payer: MEDICARE

## 2023-09-11 ENCOUNTER — CLINICAL SUPPORT (OUTPATIENT)
Dept: REHABILITATION | Facility: HOSPITAL | Age: 68
End: 2023-09-11
Payer: MEDICARE

## 2023-09-11 VITALS
WEIGHT: 169.75 LBS | BODY MASS INDEX: 28.28 KG/M2 | DIASTOLIC BLOOD PRESSURE: 66 MMHG | SYSTOLIC BLOOD PRESSURE: 106 MMHG | HEART RATE: 73 BPM | HEIGHT: 65 IN

## 2023-09-11 DIAGNOSIS — M17.11 PRIMARY OSTEOARTHRITIS OF RIGHT KNEE: ICD-10-CM

## 2023-09-11 DIAGNOSIS — M23.232 DERANG OF MEDIAL MENISCUS DUE TO OLD TEAR/INJ, LEFT KNEE: ICD-10-CM

## 2023-09-11 DIAGNOSIS — G89.29 CHRONIC PAIN OF RIGHT KNEE: Primary | ICD-10-CM

## 2023-09-11 DIAGNOSIS — M25.661 DECREASED RANGE OF MOTION (ROM) OF RIGHT KNEE: ICD-10-CM

## 2023-09-11 DIAGNOSIS — Z74.09 IMPAIRED FUNCTIONAL MOBILITY AND ACTIVITY TOLERANCE: ICD-10-CM

## 2023-09-11 DIAGNOSIS — M25.561 CHRONIC PAIN OF RIGHT KNEE: Primary | ICD-10-CM

## 2023-09-11 DIAGNOSIS — M23.200 OLD COMPLEX TEAR OF LATERAL MENISCUS OF RIGHT KNEE: Primary | ICD-10-CM

## 2023-09-11 PROCEDURE — 99999 PR PBB SHADOW E&M-EST. PATIENT-LVL III: CPT | Mod: PBBFAC,HCNC,, | Performed by: PHYSICIAN ASSISTANT

## 2023-09-11 PROCEDURE — 3008F PR BODY MASS INDEX (BMI) DOCUMENTED: ICD-10-PCS | Mod: HCNC,CPTII,S$GLB, | Performed by: PHYSICIAN ASSISTANT

## 2023-09-11 PROCEDURE — 1159F PR MEDICATION LIST DOCUMENTED IN MEDICAL RECORD: ICD-10-PCS | Mod: HCNC,CPTII,S$GLB, | Performed by: PHYSICIAN ASSISTANT

## 2023-09-11 PROCEDURE — 3074F PR MOST RECENT SYSTOLIC BLOOD PRESSURE < 130 MM HG: ICD-10-PCS | Mod: HCNC,CPTII,S$GLB, | Performed by: PHYSICIAN ASSISTANT

## 2023-09-11 PROCEDURE — 3078F PR MOST RECENT DIASTOLIC BLOOD PRESSURE < 80 MM HG: ICD-10-PCS | Mod: HCNC,CPTII,S$GLB, | Performed by: PHYSICIAN ASSISTANT

## 2023-09-11 PROCEDURE — 97530 THERAPEUTIC ACTIVITIES: CPT

## 2023-09-11 PROCEDURE — 99214 PR OFFICE/OUTPT VISIT, EST, LEVL IV, 30-39 MIN: ICD-10-PCS | Mod: HCNC,S$GLB,, | Performed by: PHYSICIAN ASSISTANT

## 2023-09-11 PROCEDURE — 97112 NEUROMUSCULAR REEDUCATION: CPT

## 2023-09-11 PROCEDURE — 3044F HG A1C LEVEL LT 7.0%: CPT | Mod: HCNC,CPTII,S$GLB, | Performed by: PHYSICIAN ASSISTANT

## 2023-09-11 PROCEDURE — 3288F PR FALLS RISK ASSESSMENT DOCUMENTED: ICD-10-PCS | Mod: HCNC,CPTII,S$GLB, | Performed by: PHYSICIAN ASSISTANT

## 2023-09-11 PROCEDURE — 99214 OFFICE O/P EST MOD 30 MIN: CPT | Mod: HCNC,S$GLB,, | Performed by: PHYSICIAN ASSISTANT

## 2023-09-11 PROCEDURE — 1101F PR PT FALLS ASSESS DOC 0-1 FALLS W/OUT INJ PAST YR: ICD-10-PCS | Mod: HCNC,CPTII,S$GLB, | Performed by: PHYSICIAN ASSISTANT

## 2023-09-11 PROCEDURE — 3008F BODY MASS INDEX DOCD: CPT | Mod: HCNC,CPTII,S$GLB, | Performed by: PHYSICIAN ASSISTANT

## 2023-09-11 PROCEDURE — 3074F SYST BP LT 130 MM HG: CPT | Mod: HCNC,CPTII,S$GLB, | Performed by: PHYSICIAN ASSISTANT

## 2023-09-11 PROCEDURE — 1160F RVW MEDS BY RX/DR IN RCRD: CPT | Mod: HCNC,CPTII,S$GLB, | Performed by: PHYSICIAN ASSISTANT

## 2023-09-11 PROCEDURE — 3078F DIAST BP <80 MM HG: CPT | Mod: HCNC,CPTII,S$GLB, | Performed by: PHYSICIAN ASSISTANT

## 2023-09-11 PROCEDURE — 99999 PR PBB SHADOW E&M-EST. PATIENT-LVL III: ICD-10-PCS | Mod: PBBFAC,HCNC,, | Performed by: PHYSICIAN ASSISTANT

## 2023-09-11 PROCEDURE — 1126F PR PAIN SEVERITY QUANTIFIED, NO PAIN PRESENT: ICD-10-PCS | Mod: HCNC,CPTII,S$GLB, | Performed by: PHYSICIAN ASSISTANT

## 2023-09-11 PROCEDURE — 1126F AMNT PAIN NOTED NONE PRSNT: CPT | Mod: HCNC,CPTII,S$GLB, | Performed by: PHYSICIAN ASSISTANT

## 2023-09-11 PROCEDURE — 3288F FALL RISK ASSESSMENT DOCD: CPT | Mod: HCNC,CPTII,S$GLB, | Performed by: PHYSICIAN ASSISTANT

## 2023-09-11 PROCEDURE — 1101F PT FALLS ASSESS-DOCD LE1/YR: CPT | Mod: HCNC,CPTII,S$GLB, | Performed by: PHYSICIAN ASSISTANT

## 2023-09-11 PROCEDURE — 1159F MED LIST DOCD IN RCRD: CPT | Mod: HCNC,CPTII,S$GLB, | Performed by: PHYSICIAN ASSISTANT

## 2023-09-11 PROCEDURE — 1160F PR REVIEW ALL MEDS BY PRESCRIBER/CLIN PHARMACIST DOCUMENTED: ICD-10-PCS | Mod: HCNC,CPTII,S$GLB, | Performed by: PHYSICIAN ASSISTANT

## 2023-09-11 PROCEDURE — 3044F PR MOST RECENT HEMOGLOBIN A1C LEVEL <7.0%: ICD-10-PCS | Mod: HCNC,CPTII,S$GLB, | Performed by: PHYSICIAN ASSISTANT

## 2023-09-11 RX ORDER — MELOXICAM 15 MG/1
15 TABLET ORAL DAILY
Qty: 35 TABLET | Refills: 0 | Status: SHIPPED | OUTPATIENT
Start: 2023-09-11 | End: 2023-10-16

## 2023-09-11 NOTE — PROGRESS NOTES
"[facility]   Physical Therapy Treatment Note      Name: Celina Azevedo  Clinic Number: [id number]    Therapy Diagnosis:   Encounter Diagnoses   Name Primary?    Chronic pain of right knee Yes    Decreased range of motion (ROM) of right knee     Impaired functional mobility and activity tolerance      Physician: Jv Bella, *    Visit Date: 9/11/2023    Physician Orders: PT Eval and Treat   Medical Diagnosis from Referral:   M17.11 (ICD-10-CM) - Primary osteoarthritis of right knee   S80.01XA (ICD-10-CM) - Contusion of right knee, initial encounter      Evaluation Date: 8/15/2023  Authorization Period Expiration: 6/28/2024  Plan of Care Expiration: 10/27/2023  Progress Note Due: 10th visit  Visit # / Visits authorized: 1/ 1   FOTO: 1/ 3     Precautions: Standard      Time In: 7:12 am  Time Out: 8:01 am  Total treatment time: 49 minutes  Total Billable Time: 49 minutes ( 2 TA 1 NMR)    PTA Visit #: 0/5       Subjective     Patient reports: she has not been performing her HEP due to being "busy."  She was NOT compliant with home exercise program.  Response to previous treatment: no adverse effects  Functional change: noting new to note    Pain: NT/10  Location: right knee      Objective      Objective Measures updated at progress report unless specified.     Treatment     Celina received the treatments listed below:      neuromuscular re-education activities to improve: Kinesthetic, Proprioception, and motor control for 25 minutes. The following activities were included:  Short arc quads with medium bolster 5 seconds, 20x + 3 lbs  Heel prop with strap for improved quad activation: 30 seconds, 3x  Long arc quads 5 seconds, 30x + 3 lkbs  SLR: 2x10 - quad lag- not today  Sidelying hip abd - drift into flexion - not today    THERAPEUTIC ACTIVITIES to improve dynamic and functional performance for 24 minutes including activities below.  Aerobic activity: recumbent bike L5 5 minutes " for AROM, LE and CV endurance  Shuttle double leg press: 50 lbs 3x10  Shuttle single leg press: 25 lbs 3x10 each    Patient Education and Home Exercises       Education provided:   - continue HEP    Written Home Exercises Provided: Patient instructed to cont prior HEP. Exercises were reviewed and Celina was able to demonstrate them prior to the end of the session.  Celina demonstrated good  understanding of the education provided. See Electronic Medical Record under Patient Instructions for exercises provided during therapy sessions    Assessment     Progressed therapeutic activities to include shuttle leg press to improve capacity for sit to stands; no exacerbation of pain. Education regarding importance of HEP compliance.     Celina Is progressing well towards her goals.   Patient prognosis is Fair.     Patient will continue to benefit from skilled outpatient physical therapy to address the deficits listed in the problem list box on initial evaluation, provide pt/family education and to maximize pt's level of independence in the home and community environment.     Patient's spiritual, cultural and educational needs considered and pt agreeable to plan of care and goals.     Anticipated barriers to physical therapy: compliance and chronicity    Goals:   Short Term Goals (3 Weeks):   1. Patient will be independent with home exercise program to supplement physical therapy treatment in improving functional status.  2. Pt will improve impaired lower extremity manual muscle tests to >/= 4-/5 to improve dynamic bilateral knee support for closed chain tasks.     Long Term Goals (6 Weeks):   1. Pt will improve impaired lower extremity manual muscle tests to >/= 4/5 to improve dynamic bilateral knee support for closed chain tasks.  2. Patient will improve the total FOTO Knee Survey Score to </= 40% limited to demonstrate increased perceived functional mobility.  4. Patient will perform timed up and go with least  restrictive assistive device in < 12 seconds to improve gait speed and safety with community ambulation.  5. Patient will perform at least 10 sit to stands in 30 seconds without UE support to demonstrate increased functional strength.   6. Patient ambulate 1 flight of stairs with <3/10 pain    Plan     Plan of care Certification: 8/15/2023 to 10/27/2023.     Outpatient Physical Therapy 2 times weekly for 10 weeks to include the following interventions: Aquatic Therapy, Cervical/Lumbar Traction, Electrical Stimulation TENS, Gait Training, Manual Therapy, Moist Heat/ Ice, Neuromuscular Re-ed, Orthotic Management and Training, Patient Education, Self Care, Therapeutic Activities, Therapeutic Exercise, and functional dry needling.     Fadumo Shen, PT

## 2023-09-11 NOTE — PROGRESS NOTES
CC: Right knee pain    DATE OF PROCEDURE: 4/1/2022   PROCEDURE PERFORMED:   Left knee arthroscopic partial lateral meniscectomy (CPT 54028)      Left knee arthroscopic chondroplasty   Left knee arthroscopic partial synovectomy    Celina who presents for follow up of right knee. At previous visit on 7/20 MRI findings of chondromalacia as well as treatment options were discussed with the patient. At [place] she did not want surgery and chose CSI of the right knee. She is not taking an antiinflammatory. She says her pain has improved but she still has swelling in the knee. Wears a compression sleeve intermittently. She denies any mechanical symptoms. Here sister is here with her today interpreting.    Prior Hx 6/30/23:  67 y.o. who returns to see me with a new complaint.  She fell this past Saturday on her flexed right knee and was unable to bear much weight afterwards.  She was seen in the emergency department.  X-rays at that time were negative.  She was given a knee immobilizer and told to follow up with Orthopedics.  She presents to clinic today with her .  She is partially English speaking.  We had the assistance of a  today as well (#[id number]).  She has been ambulating with a walker.  Unable to bear much weight on the right lower extremity.  Tylenol and anti-inflammatory medication as needed.  Has included ice.  Localizes pain anteromedially.    REVIEW OF SYSTEMS:   Constitution: Negative. Negative for chills, fever and night sweats.    Hematologic/Lymphatic: Negative for bleeding problem. Does not bruise/bleed easily.   Skin: Negative for dry skin, itching and rash.   Musculoskeletal: Negative for falls. Positive for right knee pain and muscle weakness.     All other review of symptoms were reviewed and found to be noncontributory.     PAST MEDICAL HISTORY:   Past Medical History:   Diagnosis Date    Hyperlipidemia      PAST SURGICAL HISTORY:   Past Surgical History:   Procedure Laterality Date     ARTHROSCOPIC CHONDROPLASTY OF KNEE JOINT Left 4/1/2022    Procedure: ARTHROSCOPY, KNEE, WITH CHONDROPLASTY;  Surgeon: JEANINE Fuentes MD;  Location: [facility] OR;  Service: Orthopedics;  Laterality: Left;    HYSTERECTOMY      KNEE ARTHROSCOPY W/ MENISCECTOMY Left 4/1/2022    Procedure: ARTHROSCOPY, KNEE, WITH MENISCECTOMY partial lateral;  Surgeon: JEANINE Fuentes MD;  Location: [facility] OR;  Service: Orthopedics;  Laterality: Left;  regional with catheter- adductor  pericapsular injection: 30cc MILTON     FAMILY HISTORY:   History reviewed. No pertinent family history.    SOCIAL HISTORY:   Social History     Socioeconomic History    Marital status:    Tobacco Use    Smoking status: Never    Smokeless tobacco: Never   Substance and Sexual Activity    Alcohol use: No    Drug use: No     Social Determinants of Health     Financial Resource Strain: Low Risk  (1/13/2023)    Overall Financial Resource Strain (CARDIA)     Difficulty of Paying Living Expenses: Not hard at all   Food Insecurity: No Food Insecurity (1/13/2023)    Hunger Vital Sign     Worried About Running Out of Food in the Last Year: Never true     Ran Out of Food in the Last Year: Never true   Transportation Needs: No Transportation Needs (1/13/2023)    PRAPARE - Transportation     Lack of Transportation (Medical): No     Lack of Transportation (Non-Medical): No   Physical Activity: Inactive (1/13/2023)    Exercise Vital Sign     Days of Exercise per Week: 0 days     Minutes of Exercise per Session: 0 min   Stress: No Stress Concern Present (1/13/2023)    [employer] [place] [employer] - Occupational Stress Questionnaire     Feeling of Stress : Not at all   Social Connections: Moderately Isolated (1/13/2023)    Social Connection and Isolation Panel [NHANES]     Frequency of Communication with Friends and Family: More than three times a week     Frequency of Social Gatherings with Friends and Family: More than three times a week     Attends  "Yarsani Services: Never     Active Member of Clubs or Organizations: No     Attends Club or Organization Meetings: Never     Marital Status:    Housing Stability: Low Risk  (1/13/2023)    Housing Stability Vital Sign     Unable to Pay for Housing in the Last Year: No     Number of Places Lived in the Last Year: 1     Unstable Housing in the Last Year: No     MEDICATIONS:     Current Outpatient Medications:     atorvastatin (LIPITOR) 20 MG tablet, Take 1 tablet (20 mg total) by mouth once daily., Disp: 90 tablet, Rfl: 3    triamcinolone acetonide 0.1% (KENALOG) 0.1 % Lotn, aaa qd- bid prn flare. Not more than 2 weeks straight in the same location, Disp: 60 mL, Rfl: 3    meloxicam (MOBIC) 15 MG tablet, Take 1 tablet (15 mg total) by mouth once daily., Disp: 35 tablet, Rfl: 0    ALLERGIES:   Review of patient's allergies indicates:  No Known Allergies     PHYSICAL EXAMINATION:  /66   Pulse 73   Ht 5' 5" (1.651 m)   Wt 77 kg (169 lb 12.1 oz)   BMI 28.25 kg/m²   General: Well-developed well-nourished 67 y.o. femalein no acute distress   Cardiovascular: Regular rhythm by palpation of distal pulse, normal color and temperature, no concerning varicosities on symptomatic side   Lungs: No labored breathing or wheezing appreciated   Neuro: Alert and oriented ×3   Psychiatric: well oriented to person, place and time, demonstrates normal mood and affect   Skin: No rashes, lesions or ulcers, normal temperature, turgor, and texture on involved extremity    Ortho/SPM Exam  Examination of the right knee demonstrates some trace residual swelling.  Active extension to 5-10° short of full extension.  Flexion to 125°.  She does have some tenderness to palpation over lateral joint line.  Ligamentously stable.  Intact extensor mechanism.    IMAGING:  Prior X-rays including standing, weight bearing AP and flexion bilateral knees, RIGHT knee lateral and sunrise views ordered and images reviewed by me show:    No fracture " seen.  She does have some tricompartmental degenerative changes with medial joint space narrowing.    MRI Right Knee 6/30/23:  There is a tear of the proximal attachment of the ACL.  The PCL is intact.  Quadriceps and patellar tendons are unremarkable.  There is a tear of the proximal attachment of the MCL.  LCL is grossly intact.  There is a joint effusion.  There is bone contusion of the posterolateral tibial plateau.  There is mild DJD.  Patella, is cartilage and retinacula are intact.  There is a joint effusion.  There is soft tissue edema.  There is a chronic tear of the body and posterior horn of the medial meniscus.  There is a tear of the body and posterior horn of the lateral meniscus and the posterior horn of the lateral meniscus has flipped anteriorly on top of the anterior horn.    ASSESSMENT:      ICD-10-CM ICD-9-CM   1. Old complex tear of lateral meniscus of right knee  M23.200 717.40   2. Derang of medial meniscus due to old tear/inj, left knee  M23.232 717.3   3. Primary osteoarthritis of right knee  M17.11 715.16         PLAN:     Patient with chondromalacia and early arthritic change along with both medial and lateral meniscus tears.  Her lateral meniscus tear is unstable and flipped over on itself from posterior to anterior.  Today she has full ROM on exam. Arthroscopy previously discussed with Dr. Fuentes- we do think the arthroscopic partial meniscectomy would help her obviously with her mechanical block. She is hesitant about surgery still today. She is not taking a regular antiinflammatory. It seems her main complaint is pain, no the mechanical issues. I will start her on Meloxicam. She can continue PT. We will see her back in 2 months to see how she has responded. Possible visco in the future vs repeat CSI if her only complaint is pain. If she has issues with mechanical symptoms surgery Is recommended.

## 2023-09-13 ENCOUNTER — CLINICAL SUPPORT (OUTPATIENT)
Dept: REHABILITATION | Facility: HOSPITAL | Age: 68
End: 2023-09-13
Payer: MEDICARE

## 2023-09-13 DIAGNOSIS — M25.661 DECREASED RANGE OF MOTION (ROM) OF RIGHT KNEE: ICD-10-CM

## 2023-09-13 DIAGNOSIS — M25.561 CHRONIC PAIN OF RIGHT KNEE: Primary | ICD-10-CM

## 2023-09-13 DIAGNOSIS — G89.29 CHRONIC PAIN OF RIGHT KNEE: Primary | ICD-10-CM

## 2023-09-13 DIAGNOSIS — Z74.09 IMPAIRED FUNCTIONAL MOBILITY AND ACTIVITY TOLERANCE: ICD-10-CM

## 2023-09-13 PROCEDURE — 97112 NEUROMUSCULAR REEDUCATION: CPT

## 2023-09-13 PROCEDURE — 97530 THERAPEUTIC ACTIVITIES: CPT

## 2023-09-13 NOTE — PROGRESS NOTES
[facility]   Physical Therapy Treatment Note      Name: Celina [facility] Number: [id number]    Therapy Diagnosis:   Encounter Diagnoses   Name Primary?    Chronic pain of right knee Yes    Decreased range of motion (ROM) of right knee     Impaired functional mobility and activity tolerance      Physician: Jv Bella, *    Visit Date: 9/13/2023    Physician Orders: PT Eval and Treat   Medical Diagnosis from Referral:   M17.11 (ICD-10-CM) - Primary osteoarthritis of right knee   S80.01XA (ICD-10-CM) - Contusion of right knee, initial encounter      Evaluation Date: 8/15/2023  Authorization Period Expiration: 6/28/2024  Plan of Care Expiration: 10/27/2023  Progress Note Due: 10th visit  Visit # / Visits authorized: 1/ 1   FOTO: 1/ 3     Precautions: Standard      Time In: 7:10 am  Time Out: 7:59 am  Total treatment time: 49 minutes  Total Billable Time: 49 minutes ( 1 TA 1 NMR)    PTA Visit #: 0/5       Subjective     Patient reports: doing okay  She was NOT compliant with home exercise program.  Response to previous treatment: no adverse effects  Functional change: noting new to note    Pain: NT/10  Location: right knee      Objective      Objective Measures updated at progress report unless specified.     Treatment     Celina received the treatments listed below:      neuromuscular re-education activities to improve: Kinesthetic, Proprioception, and motor control for 25 minutes. The following activities were included:  Short arc quads with medium bolster 5 seconds, 20x + 3 lbs  Heel prop with strap for improved quad activation: 30 seconds, 3x  Long arc quads 5 seconds, 30x + 3 lkbs  SLR: 2x10 - quad lag  Sidelying hip abd - drift into flexion - not today    THERAPEUTIC ACTIVITIES to improve dynamic and functional performance for 24 minutes including activities below.  Aerobic activity: recumbent bike L5 5 minutes for AROM, LE and CV endurance  Shuttle double leg press: 50  lbs 3x10  Shuttle single leg press: 25 lbs 3x10 each    Patient Education and Home Exercises       Education provided:   - continue HEP    Written Home Exercises Provided: Patient instructed to cont prior HEP. Exercises were reviewed and Celina was able to demonstrate them prior to the end of the session.  Celina demonstrated good  understanding of the education provided. See Electronic Medical Record under Patient Instructions for exercises provided during therapy sessions    Assessment     Progressing well per phase of rehab. Will continue to progress as tolerated.     Celina Is progressing well towards her goals.   Patient prognosis is Fair.     Patient will continue to benefit from skilled outpatient physical therapy to address the deficits listed in the problem list box on initial evaluation, provide pt/family education and to maximize pt's level of independence in the home and community environment.     Patient's spiritual, cultural and educational needs considered and pt agreeable to plan of care and goals.     Anticipated barriers to physical therapy: compliance and chronicity    Goals:   Short Term Goals (3 Weeks):   1. Patient will be independent with home exercise program to supplement physical therapy treatment in improving functional status.  2. Pt will improve impaired lower extremity manual muscle tests to >/= 4-/5 to improve dynamic bilateral knee support for closed chain tasks.     Long Term Goals (6 Weeks):   1. Pt will improve impaired lower extremity manual muscle tests to >/= 4/5 to improve dynamic bilateral knee support for closed chain tasks.  2. Patient will improve the total FOTO Knee Survey Score to </= 40% limited to demonstrate increased perceived functional mobility.  4. Patient will perform timed up and go with least restrictive assistive device in < 12 seconds to improve gait speed and safety with community ambulation.  5. Patient will perform at least 10 sit to stands in 30  seconds without UE support to demonstrate increased functional strength.   6. Patient ambulate 1 flight of stairs with <3/10 pain    Plan     Plan of care Certification: 8/15/2023 to 10/27/2023.     Outpatient Physical Therapy 2 times weekly for 10 weeks to include the following interventions: Aquatic Therapy, Cervical/Lumbar Traction, Electrical Stimulation TENS, Gait Training, Manual Therapy, Moist Heat/ Ice, Neuromuscular Re-ed, Orthotic Management and Training, Patient Education, Self Care, Therapeutic Activities, Therapeutic Exercise, and functional dry needling.     Fadumo Shen, PT

## 2023-09-18 ENCOUNTER — DOCUMENTATION ONLY (OUTPATIENT)
Dept: REHABILITATION | Facility: HOSPITAL | Age: 68
End: 2023-09-18
Payer: MEDICARE

## 2023-09-18 NOTE — PROGRESS NOTES
Physical Therapy: No show/Cancellation of Visit  Date: 09/18/2023    Patient was a no show to today's PT appointment. Patient's next scheduled appointment is 9/20/2023.     Initial Evaluation: 8/15/2023  Plan of Care Explanation: 10/27/2023  Cancel: 0  No show: 1    Therapist: Fadumo Shen PT

## 2023-09-20 ENCOUNTER — CLINICAL SUPPORT (OUTPATIENT)
Dept: REHABILITATION | Facility: HOSPITAL | Age: 68
End: 2023-09-20
Payer: MEDICARE

## 2023-09-20 DIAGNOSIS — M25.661 DECREASED RANGE OF MOTION (ROM) OF RIGHT KNEE: ICD-10-CM

## 2023-09-20 DIAGNOSIS — Z74.09 IMPAIRED FUNCTIONAL MOBILITY AND ACTIVITY TOLERANCE: ICD-10-CM

## 2023-09-20 DIAGNOSIS — M25.561 CHRONIC PAIN OF RIGHT KNEE: Primary | ICD-10-CM

## 2023-09-20 DIAGNOSIS — G89.29 CHRONIC PAIN OF RIGHT KNEE: Primary | ICD-10-CM

## 2023-09-20 PROCEDURE — 97112 NEUROMUSCULAR REEDUCATION: CPT

## 2023-09-20 PROCEDURE — 97530 THERAPEUTIC ACTIVITIES: CPT

## 2023-09-20 NOTE — PROGRESS NOTES
[facility]   Physical Therapy Treatment Note      Name: Celina [facility] Number: [id number]    Therapy Diagnosis:   Encounter Diagnoses   Name Primary?    Chronic pain of right knee Yes    Decreased range of motion (ROM) of right knee     Impaired functional mobility and activity tolerance      Physician: Jv Bella, *    Visit Date: 9/20/2023    Physician Orders: PT Eval and Treat   Medical Diagnosis from Referral:   M17.11 (ICD-10-CM) - Primary osteoarthritis of right knee   S80.01XA (ICD-10-CM) - Contusion of right knee, initial encounter      Evaluation Date: 8/15/2023  Authorization Period Expiration: 6/28/2024  Plan of Care Expiration: 10/27/2023  Progress Note Due: 10th visit  Visit # / Visits authorized: 1/ 1   FOTO: 1/ 3     Precautions: Standard      Time In: 7:00 am  Time Out: 7:51 am  Total treatment time: 51 minutes  Total Billable Time: 25 minutes ( 1 TA 1 NMR)    PTA Visit #: 0/5       Subjective     Patient reports: she is not feeling well so she is wearing a mask.   She was NOT compliant with home exercise program.  Response to previous treatment: no adverse effects  Functional change: noting new to note    Pain: NT/10  Location: right knee      Objective      Objective Measures updated at progress report unless specified.     Treatment     Celina received the treatments listed below:      neuromuscular re-education activities to improve: Kinesthetic, Proprioception, and motor control for 24 minutes. The following activities were included:  Short arc quads with medium bolster 5 seconds, 30x + 4 lbs each  Heel prop with strap for improved quad activation: 30 seconds, 3x - not today  Long arc quads 5 seconds, 30x + 4 lbs each  Short arc quad with 1/2 bolster: 10 seconds, 20x each  SLR: 2x10 - quad lag - not today  Sidelying hip abd - drift into flexion - not today    THERAPEUTIC ACTIVITIES to improve dynamic and functional performance for 27 minutes  including activities below.  Aerobic activity: recumbent bike L5 5 minutes for AROM, LE and CV endurance  Shuttle double leg press: 62 lbs 3x10  Shuttle single leg press: 37  lbs 3x10 each    Patient Education and Home Exercises       Education provided:   - continue HEP    Written Home Exercises Provided: Patient instructed to cont prior HEP. Exercises were reviewed and Celina was able to demonstrate them prior to the end of the session.  Celina demonstrated good  understanding of the education provided. See Electronic Medical Record under Patient Instructions for exercises provided during therapy sessions    Assessment     Progressed resistance for quad muscle activation activities to 4 lbs with good tolerance. Increased resistance on shuttle with good response noting increased fatigue post activity.      Celina Is progressing well towards her goals.   Patient prognosis is Fair.     Patient will continue to benefit from skilled outpatient physical therapy to address the deficits listed in the problem list box on initial evaluation, provide pt/family education and to maximize pt's level of independence in the home and community environment.     Patient's spiritual, cultural and educational needs considered and pt agreeable to plan of care and goals.     Anticipated barriers to physical therapy: compliance and chronicity    Goals:   Short Term Goals (3 Weeks):   1. Patient will be independent with home exercise program to supplement physical therapy treatment in improving functional status.  2. Pt will improve impaired lower extremity manual muscle tests to >/= 4-/5 to improve dynamic bilateral knee support for closed chain tasks.     Long Term Goals (6 Weeks):   1. Pt will improve impaired lower extremity manual muscle tests to >/= 4/5 to improve dynamic bilateral knee support for closed chain tasks.  2. Patient will improve the total FOTO Knee Survey Score to </= 40% limited to demonstrate increased  perceived functional mobility.  4. Patient will perform timed up and go with least restrictive assistive device in < 12 seconds to improve gait speed and safety with community ambulation.  5. Patient will perform at least 10 sit to stands in 30 seconds without UE support to demonstrate increased functional strength.   6. Patient ambulate 1 flight of stairs with <3/10 pain    Plan     Plan of care Certification: 8/15/2023 to 10/27/2023.     Outpatient Physical Therapy 2 times weekly for 10 weeks to include the following interventions: Aquatic Therapy, Cervical/Lumbar Traction, Electrical Stimulation TENS, Gait Training, Manual Therapy, Moist Heat/ Ice, Neuromuscular Re-ed, Orthotic Management and Training, Patient Education, Self Care, Therapeutic Activities, Therapeutic Exercise, and functional dry needling.     Fadumo Shen, PT

## 2023-09-25 DIAGNOSIS — E78.1 HYPERTRIGLYCERIDEMIA: ICD-10-CM

## 2023-09-25 RX ORDER — ATORVASTATIN CALCIUM 20 MG/1
20 TABLET, FILM COATED ORAL DAILY
Qty: 90 TABLET | Refills: 3 | Status: SHIPPED | OUTPATIENT
Start: 2023-09-25 | End: 2024-09-24

## 2023-10-30 ENCOUNTER — LAB VISIT (OUTPATIENT)
Dept: LAB | Facility: HOSPITAL | Age: 68
End: 2023-10-30
Attending: STUDENT IN AN ORGANIZED HEALTH CARE EDUCATION/TRAINING PROGRAM
Payer: MEDICARE

## 2023-10-30 ENCOUNTER — OFFICE VISIT (OUTPATIENT)
Dept: INTERNAL MEDICINE | Facility: CLINIC | Age: 68
End: 2023-10-30
Payer: MEDICARE

## 2023-10-30 ENCOUNTER — HOSPITAL ENCOUNTER (OUTPATIENT)
Dept: CARDIOLOGY | Facility: CLINIC | Age: 68
Discharge: HOME | End: 2023-10-30
Payer: MEDICARE

## 2023-10-30 ENCOUNTER — TELEPHONE (OUTPATIENT)
Dept: INTERNAL MEDICINE | Facility: CLINIC | Age: 68
End: 2023-10-30

## 2023-10-30 VITALS
HEART RATE: 64 BPM | HEIGHT: 65 IN | BODY MASS INDEX: 28.28 KG/M2 | DIASTOLIC BLOOD PRESSURE: 81 MMHG | SYSTOLIC BLOOD PRESSURE: 139 MMHG | WEIGHT: 169.75 LBS

## 2023-10-30 DIAGNOSIS — Z01.818 PREOPERATIVE CLEARANCE: ICD-10-CM

## 2023-10-30 DIAGNOSIS — R79.1 ABNORMAL COAGULATION PROFILE: ICD-10-CM

## 2023-10-30 DIAGNOSIS — M25.561 CHRONIC PAIN OF RIGHT KNEE: ICD-10-CM

## 2023-10-30 DIAGNOSIS — Z01.818 PREOPERATIVE CLEARANCE: Primary | ICD-10-CM

## 2023-10-30 DIAGNOSIS — R92.2 INCONCLUSIVE MAMMOGRAPHY: ICD-10-CM

## 2023-10-30 DIAGNOSIS — G89.29 CHRONIC PAIN OF RIGHT KNEE: ICD-10-CM

## 2023-10-30 DIAGNOSIS — Z01.818 PREOP TESTING: Primary | ICD-10-CM

## 2023-10-30 DIAGNOSIS — Z00.00 HEALTHCARE MAINTENANCE: ICD-10-CM

## 2023-10-30 LAB
ALBUMIN SERPL BCP-MCNC: 3.7 G/DL (ref 3.5–5.2)
ALP SERPL-CCNC: 67 U/L (ref 55–135)
ALT SERPL W/O P-5'-P-CCNC: 18 U/L (ref 10–44)
ANION GAP SERPL CALC-SCNC: 5 MMOL/L (ref 8–16)
APTT PPP: 25.1 SEC (ref 21–32)
AST SERPL-CCNC: 19 U/L (ref 10–40)
BASOPHILS # BLD AUTO: 0.04 K/UL (ref 0–0.2)
BASOPHILS NFR BLD: 0.7 % (ref 0–1.9)
BILIRUB SERPL-MCNC: 0.4 MG/DL (ref 0.1–1)
BUN SERPL-MCNC: 21 MG/DL (ref 8–23)
CALCIUM SERPL-MCNC: 9.6 MG/DL (ref 8.7–10.5)
CHLORIDE SERPL-SCNC: 106 MMOL/L (ref 95–110)
CO2 SERPL-SCNC: 31 MMOL/L (ref 23–29)
CREAT SERPL-MCNC: 0.9 MG/DL (ref 0.5–1.4)
DIFFERENTIAL METHOD: ABNORMAL
EOSINOPHIL # BLD AUTO: 0.1 K/UL (ref 0–0.5)
EOSINOPHIL NFR BLD: 1.9 % (ref 0–8)
ERYTHROCYTE [DISTWIDTH] IN BLOOD BY AUTOMATED COUNT: 13.4 % (ref 11.5–14.5)
EST. GFR  (NO RACE VARIABLE): >60 ML/MIN/1.73 M^2
GLUCOSE SERPL-MCNC: 98 MG/DL (ref 70–110)
HCT VFR BLD AUTO: 39.3 % (ref 37–48.5)
HGB BLD-MCNC: 12.3 G/DL (ref 12–16)
IMM GRANULOCYTES # BLD AUTO: 0.02 K/UL (ref 0–0.04)
IMM GRANULOCYTES NFR BLD AUTO: 0.3 % (ref 0–0.5)
INR PPP: 0.9 (ref 0.8–1.2)
LYMPHOCYTES # BLD AUTO: 1.6 K/UL (ref 1–4.8)
LYMPHOCYTES NFR BLD: 26.8 % (ref 18–48)
MCH RBC QN AUTO: 28.5 PG (ref 27–31)
MCHC RBC AUTO-ENTMCNC: 31.3 G/DL (ref 32–36)
MCV RBC AUTO: 91 FL (ref 82–98)
MONOCYTES # BLD AUTO: 0.5 K/UL (ref 0.3–1)
MONOCYTES NFR BLD: 8 % (ref 4–15)
NEUTROPHILS # BLD AUTO: 3.7 K/UL (ref 1.8–7.7)
NEUTROPHILS NFR BLD: 62.3 % (ref 38–73)
NRBC BLD-RTO: 0 /100 WBC
PLATELET # BLD AUTO: 195 K/UL (ref 150–450)
PMV BLD AUTO: 11.1 FL (ref 9.2–12.9)
POTASSIUM SERPL-SCNC: 4.7 MMOL/L (ref 3.5–5.1)
PROT SERPL-MCNC: 7 G/DL (ref 6–8.4)
PROTHROMBIN TIME: 9.9 SEC (ref 9–12.5)
RBC # BLD AUTO: 4.31 M/UL (ref 4–5.4)
SODIUM SERPL-SCNC: 142 MMOL/L (ref 136–145)
WBC # BLD AUTO: 5.89 K/UL (ref 3.9–12.7)

## 2023-10-30 PROCEDURE — 1159F PR MEDICATION LIST DOCUMENTED IN MEDICAL RECORD: ICD-10-PCS | Mod: HCNC,CPTII,S$GLB, | Performed by: STUDENT IN AN ORGANIZED HEALTH CARE EDUCATION/TRAINING PROGRAM

## 2023-10-30 PROCEDURE — 3288F FALL RISK ASSESSMENT DOCD: CPT | Mod: HCNC,CPTII,S$GLB, | Performed by: STUDENT IN AN ORGANIZED HEALTH CARE EDUCATION/TRAINING PROGRAM

## 2023-10-30 PROCEDURE — 3079F DIAST BP 80-89 MM HG: CPT | Mod: HCNC,CPTII,S$GLB, | Performed by: STUDENT IN AN ORGANIZED HEALTH CARE EDUCATION/TRAINING PROGRAM

## 2023-10-30 PROCEDURE — 3044F PR MOST RECENT HEMOGLOBIN A1C LEVEL <7.0%: ICD-10-PCS | Mod: HCNC,CPTII,S$GLB, | Performed by: STUDENT IN AN ORGANIZED HEALTH CARE EDUCATION/TRAINING PROGRAM

## 2023-10-30 PROCEDURE — 1126F PR PAIN SEVERITY QUANTIFIED, NO PAIN PRESENT: ICD-10-PCS | Mod: HCNC,CPTII,S$GLB, | Performed by: STUDENT IN AN ORGANIZED HEALTH CARE EDUCATION/TRAINING PROGRAM

## 2023-10-30 PROCEDURE — 3044F HG A1C LEVEL LT 7.0%: CPT | Mod: HCNC,CPTII,S$GLB, | Performed by: STUDENT IN AN ORGANIZED HEALTH CARE EDUCATION/TRAINING PROGRAM

## 2023-10-30 PROCEDURE — 93005 EKG 12-LEAD: ICD-10-PCS | Mod: HCNC,S$GLB,, | Performed by: STUDENT IN AN ORGANIZED HEALTH CARE EDUCATION/TRAINING PROGRAM

## 2023-10-30 PROCEDURE — 80053 COMPREHEN METABOLIC PANEL: CPT | Mod: HCNC | Performed by: STUDENT IN AN ORGANIZED HEALTH CARE EDUCATION/TRAINING PROGRAM

## 2023-10-30 PROCEDURE — 99214 OFFICE O/P EST MOD 30 MIN: CPT | Mod: HCNC,S$GLB,, | Performed by: STUDENT IN AN ORGANIZED HEALTH CARE EDUCATION/TRAINING PROGRAM

## 2023-10-30 PROCEDURE — 93005 ELECTROCARDIOGRAM TRACING: CPT | Mod: HCNC,S$GLB,, | Performed by: STUDENT IN AN ORGANIZED HEALTH CARE EDUCATION/TRAINING PROGRAM

## 2023-10-30 PROCEDURE — 3075F SYST BP GE 130 - 139MM HG: CPT | Mod: HCNC,CPTII,S$GLB, | Performed by: STUDENT IN AN ORGANIZED HEALTH CARE EDUCATION/TRAINING PROGRAM

## 2023-10-30 PROCEDURE — 1126F AMNT PAIN NOTED NONE PRSNT: CPT | Mod: HCNC,CPTII,S$GLB, | Performed by: STUDENT IN AN ORGANIZED HEALTH CARE EDUCATION/TRAINING PROGRAM

## 2023-10-30 PROCEDURE — 3079F PR MOST RECENT DIASTOLIC BLOOD PRESSURE 80-89 MM HG: ICD-10-PCS | Mod: HCNC,CPTII,S$GLB, | Performed by: STUDENT IN AN ORGANIZED HEALTH CARE EDUCATION/TRAINING PROGRAM

## 2023-10-30 PROCEDURE — 85730 THROMBOPLASTIN TIME PARTIAL: CPT | Mod: HCNC | Performed by: STUDENT IN AN ORGANIZED HEALTH CARE EDUCATION/TRAINING PROGRAM

## 2023-10-30 PROCEDURE — 85610 PROTHROMBIN TIME: CPT | Mod: HCNC | Performed by: STUDENT IN AN ORGANIZED HEALTH CARE EDUCATION/TRAINING PROGRAM

## 2023-10-30 PROCEDURE — 3288F PR FALLS RISK ASSESSMENT DOCUMENTED: ICD-10-PCS | Mod: HCNC,CPTII,S$GLB, | Performed by: STUDENT IN AN ORGANIZED HEALTH CARE EDUCATION/TRAINING PROGRAM

## 2023-10-30 PROCEDURE — 3075F PR MOST RECENT SYSTOLIC BLOOD PRESS GE 130-139MM HG: ICD-10-PCS | Mod: HCNC,CPTII,S$GLB, | Performed by: STUDENT IN AN ORGANIZED HEALTH CARE EDUCATION/TRAINING PROGRAM

## 2023-10-30 PROCEDURE — 93010 ELECTROCARDIOGRAM REPORT: CPT | Mod: HCNC,S$GLB,, | Performed by: INTERNAL MEDICINE

## 2023-10-30 PROCEDURE — 3008F PR BODY MASS INDEX (BMI) DOCUMENTED: ICD-10-PCS | Mod: HCNC,CPTII,S$GLB, | Performed by: STUDENT IN AN ORGANIZED HEALTH CARE EDUCATION/TRAINING PROGRAM

## 2023-10-30 PROCEDURE — 3008F BODY MASS INDEX DOCD: CPT | Mod: HCNC,CPTII,S$GLB, | Performed by: STUDENT IN AN ORGANIZED HEALTH CARE EDUCATION/TRAINING PROGRAM

## 2023-10-30 PROCEDURE — 99999 PR PBB SHADOW E&M-EST. PATIENT-LVL IV: ICD-10-PCS | Mod: PBBFAC,HCNC,, | Performed by: STUDENT IN AN ORGANIZED HEALTH CARE EDUCATION/TRAINING PROGRAM

## 2023-10-30 PROCEDURE — 1159F MED LIST DOCD IN RCRD: CPT | Mod: HCNC,CPTII,S$GLB, | Performed by: STUDENT IN AN ORGANIZED HEALTH CARE EDUCATION/TRAINING PROGRAM

## 2023-10-30 PROCEDURE — 1101F PT FALLS ASSESS-DOCD LE1/YR: CPT | Mod: HCNC,CPTII,S$GLB, | Performed by: STUDENT IN AN ORGANIZED HEALTH CARE EDUCATION/TRAINING PROGRAM

## 2023-10-30 PROCEDURE — 1101F PR PT FALLS ASSESS DOC 0-1 FALLS W/OUT INJ PAST YR: ICD-10-PCS | Mod: HCNC,CPTII,S$GLB, | Performed by: STUDENT IN AN ORGANIZED HEALTH CARE EDUCATION/TRAINING PROGRAM

## 2023-10-30 PROCEDURE — 99214 PR OFFICE/OUTPT VISIT, EST, LEVL IV, 30-39 MIN: ICD-10-PCS | Mod: HCNC,S$GLB,, | Performed by: STUDENT IN AN ORGANIZED HEALTH CARE EDUCATION/TRAINING PROGRAM

## 2023-10-30 PROCEDURE — 1160F PR REVIEW ALL MEDS BY PRESCRIBER/CLIN PHARMACIST DOCUMENTED: ICD-10-PCS | Mod: HCNC,CPTII,S$GLB, | Performed by: STUDENT IN AN ORGANIZED HEALTH CARE EDUCATION/TRAINING PROGRAM

## 2023-10-30 PROCEDURE — 36415 COLL VENOUS BLD VENIPUNCTURE: CPT | Mod: HCNC | Performed by: STUDENT IN AN ORGANIZED HEALTH CARE EDUCATION/TRAINING PROGRAM

## 2023-10-30 PROCEDURE — 1160F RVW MEDS BY RX/DR IN RCRD: CPT | Mod: HCNC,CPTII,S$GLB, | Performed by: STUDENT IN AN ORGANIZED HEALTH CARE EDUCATION/TRAINING PROGRAM

## 2023-10-30 PROCEDURE — 99999 PR PBB SHADOW E&M-EST. PATIENT-LVL IV: CPT | Mod: PBBFAC,HCNC,, | Performed by: STUDENT IN AN ORGANIZED HEALTH CARE EDUCATION/TRAINING PROGRAM

## 2023-10-30 PROCEDURE — 93010 EKG 12-LEAD: ICD-10-PCS | Mod: HCNC,S$GLB,, | Performed by: INTERNAL MEDICINE

## 2023-10-30 PROCEDURE — 85025 COMPLETE CBC W/AUTO DIFF WBC: CPT | Mod: HCNC | Performed by: STUDENT IN AN ORGANIZED HEALTH CARE EDUCATION/TRAINING PROGRAM

## 2023-10-30 RX ORDER — MELOXICAM 7.5 MG/1
7.5 TABLET ORAL DAILY PRN
Qty: 30 TABLET | Refills: 0 | Status: SHIPPED | OUTPATIENT
Start: 2023-10-30 | End: 2023-11-28

## 2023-10-30 NOTE — ASSESSMENT & PLAN NOTE
Patient may proceed to surgery under acceptable risk  Risk assessment made through surgical risk calculator by the [employer]. Detail as follows:

## 2023-10-30 NOTE — ASSESSMENT & PLAN NOTE
Health care maintenance and core gaps reviewed and assessed with patient.  Vaccinations recommended:        Tetanus - O       Shingles - O       Influenza - O       Pneumonia -O   Colon cancer screening:   Colonoscopy: O  Lifestyle recommendations given.  Physical activity recommended.    Legend: Ordered (O), Declined (D), Current (C)

## 2023-10-30 NOTE — PROGRESS NOTES
Subjective:       Patient ID: Celina Azevedo is a 67 y.o. female.    Chief Complaint: Pre-op Exam    HPI    Celina Azevedo is a 67 y.o. female , Azeri speaking, with a history of:  Menopause  OA knee  Osteopenia  S/P left knee arthroscopy  Prediabetes  HLD      [Local Patient]  Originally from [place]  Lives in: [place] [address]       Patient comes to the clinic for preoperative clearance.    She is overall doing well and asymptomatic.    She is scheduled to have a blepharoplasty at a surgical practice in [place], and she is coming today requesting preoperative clearance as per documentation received from the practice.    She is recovering from her knee pain, she requests meloxicam refill.    Patient denies chest pain, palpitations, SOB, or other constitutional symptoms.    Since last seen by me:      07/13/23 Chronic right knee pain - referral to PT  5/22/23 Headache    Changes in health or medications: No    Specialists visits and recommendations:        H/o ER visits:   NO    H/o Hospitalizations:  NO    H/o falls: None     Life events / lifestyle:   Nothing new      Most recent laboratories reviewed:    Recent Labs   Lab 01/17/23  0855 05/22/23  1611 10/30/23  0922   WBC 6.60 12.82 H 5.89   Hemoglobin 12.1 12.2 12.3   Hematocrit 38.6 39.6 39.3   MCV 92 93 91   Platelets 221 224 195       Recent Labs   Lab 03/08/22  1043 08/03/22  1519 12/31/22  1223 01/17/23  0855 05/22/23  1611 10/30/23  0922   Glucose 100   < > 114 H 93 102 98   Sodium 139   < > 139 140 141 142   Potassium 4.4   < > 4.3 4.3 4.3 4.7   BUN 16   < > 13 20 14 21   Creatinine 0.8   < > 0.8 0.8 0.8 0.9   eGFR if non  >60.0  --   --   --   --   --    Total Bilirubin 0.4   < > 0.2 0.5  --  0.4   AST 20   < > 24 30  --  19   ALT 21   < > 21 37  --  18    < > = values in this interval not displayed.       Recent Labs   Lab 03/08/22  1043 01/17/23  0855 05/22/23  1611   Hemoglobin A1C 5.9 H 5.9 H 5.9 H       Recent Labs   Lab  "03/08/22  1043 08/03/22  1519 03/10/23  1020   Cholesterol 257 H 146 239 H   Triglycerides 184 H 126 154 H   HDL 53 65 56   LDL Cholesterol 167.2 H 55.8 L 152.2   Non-HDL Cholesterol 204 81 183       Recent Labs   Lab 03/08/22  1043 12/31/22  1223   TSH 1.798 1.916       Recent Labs   Lab 03/08/22  1043 12/31/22  1223   HIV 1/2 Ag/Ab  --  Non-reactive   Hepatitis C Ab Negative Non-reactive         Current medications:    Current Outpatient Medications:     atorvastatin (LIPITOR) 20 MG tablet, Take 1 tablet (20 mg total) by mouth once daily., Disp: 90 tablet, Rfl: 3    triamcinolone acetonide 0.1% (KENALOG) 0.1 % Lotn, aaa qd- bid prn flare. Not more than 2 weeks straight in the same location, Disp: 60 mL, Rfl: 3    diphth,pertus,acell,,tetanus (BOOSTRIX) 2.5-8-5 Lf-mcg-Lf/0.5mL Susp, Inject 0.5 mLs into the muscle once. for 1 dose, Disp: 0.5 mL, Rfl: 0    meloxicam (MOBIC) 7.5 MG tablet, Take 1 tablet (7.5 mg total) by mouth daily as needed for Pain., Disp: 30 tablet, Rfl: 0      Healthcare Maintenance:  Colon cancer screening: no      Vaccinations:        Tetanus: no       Pneumonia: no       Zoster: no       Influenza: no       COVID vaccination: completed x 3    Depression screening: PHQ2 score = 0    Annual Wellness visit approx. Month: January ROS  11-point review of systems done. Negative except for detailed in the HPI.        Objective:      /81   Pulse 64   Ht 5' 5" (1.651 m)   Wt 77 kg (169 lb 12.1 oz)   BMI 28.25 kg/m²     Physical Exam   Physical Exam  Vitals and nursing note reviewed.   Constitutional:       Appearance: Normal appearance.   HENT:      Head: Normocephalic and atraumatic.      Right Ear: Tympanic membrane normal.      Left Ear: Tympanic membrane normal.      Nose: Nose normal.      Mouth/Throat:      Mouth: Mucous membranes are moist.      Pharynx: Oropharynx is clear.   Eyes:      Extraocular Movements: Extraocular movements intact.      Conjunctiva/sclera: Conjunctivae " normal.      Pupils: Pupils are equal, round, and reactive to light.   Cardiovascular:      Rate and Rhythm: Normal rate and regular rhythm.      Pulses: Normal pulses.      Heart sounds: Normal heart sounds.   Pulmonary:      Effort: Pulmonary effort is normal.      Breath sounds: Normal breath sounds.   Abdominal:      General: Bowel sounds are normal. There is no distension.      Palpations: Abdomen is soft.      Tenderness: There is no abdominal tenderness.   Musculoskeletal:         General: Normal range of motion.      Cervical back: Normal range of motion and neck supple.   Skin:     General: Skin is warm.   Neurological:      General: No focal deficit present.      Mental Status: She is alert and oriented to person, place, and time. Mental status is at baseline.   Psychiatric:         Mood and Affect: Mood normal.           Assessment:       1. Preoperative clearance  Assessment & Plan:  Patient may proceed to surgery under acceptable risk  Risk assessment made through surgical risk calculator by the [employer]. Detail as follows:        Orders:  -     CBC Auto Differential; Future; Expected date: 10/30/2023  -     Comprehensive Metabolic Panel; Future; Expected date: 10/30/2023  -     Protime-INR; Future; Expected date: 10/30/2023  -     APTT; Future; Expected date: 10/30/2023  -     EKG 12-lead; Future  -     Urinalysis; Future; Expected date: 10/30/2023  -     HIV 1/2 Ag/Ab (4th Gen); Future; Expected date: 10/30/2023  -     X-Ray Chest PA And Lateral; Future; Expected date: 10/30/2023    2. Chronic pain of right knee  Assessment & Plan:  Meloxicam refilled    Orders:  -     meloxicam (MOBIC) 7.5 MG tablet; Take 1 tablet (7.5 mg total) by mouth daily as needed for Pain.  Dispense: 30 tablet; Refill: 0    3. Inconclusive mammography  -     Mammo Digital Diagnostic Bilat; Future; Expected date: 10/30/2023    4. Abnormal coagulation profile  -     Protime-INR; Future; Expected date:  10/30/2023  -     APTT; Future; Expected date: 10/30/2023    5. Healthcare maintenance  Assessment & Plan:  Health care maintenance and core gaps reviewed and assessed with patient.  Vaccinations recommended:        Tetanus - O       Shingles - O       Influenza - O       Pneumonia -O   Colon cancer screening:   Colonoscopy: O  Lifestyle recommendations given.  Physical activity recommended.    Legend: Ordered (O), Declined (D), Current (C)      Orders:  -     Influenza - Quadrivalent (PF); Future; Expected date: 10/30/2023  -     diphth,pertus,acell,,tetanus (BOOSTRIX) 2.5-8-5 Lf-mcg-Lf/0.5mL Susp; Inject 0.5 mLs into the muscle once. for 1 dose  Dispense: 0.5 mL; Refill: 0  -     Zoster Recombinant Vaccine; Standing  -     RSVPreF Recombinant (Arexvy)  -     Mammo Digital Diagnostic Bilat; Future; Expected date: 10/30/2023  -     Ambulatory referral/consult to Endo Procedure ; Future; Expected date: 10/31/2023       Plan:       Vaccinations ordered: Tdap, Shingrix, Prevnar, Flu, RSV.  Colonoscopy ordered.  Meloxicam refilled  Preop labs ordered, ECG.    Patient Instructions   Get vaccinations:  - Pneumonia (Prevnar 20)  - Influenza  - Tetanus (tdap)  - Shingrix         Problem list updated, medications reconciled, education provided  Lifestyle recommendations given  AVS printed, explained, and given to the patient.  RTC in : January for AWV, labs not ordered yet.            TRICIA BUNDY MD, MPH  Internal Medicine  [facility]  [facility]

## 2023-11-01 ENCOUNTER — HOSPITAL ENCOUNTER (OUTPATIENT)
Dept: RADIOLOGY | Facility: HOSPITAL | Age: 68
Discharge: HOME | End: 2023-11-01
Attending: STUDENT IN AN ORGANIZED HEALTH CARE EDUCATION/TRAINING PROGRAM
Payer: MEDICARE

## 2023-11-01 DIAGNOSIS — Z01.818 PREOPERATIVE CLEARANCE: ICD-10-CM

## 2023-11-01 PROCEDURE — 71046 X-RAY EXAM CHEST 2 VIEWS: CPT | Mod: TC,HCNC

## 2023-11-01 PROCEDURE — 71046 XR CHEST PA AND LATERAL: ICD-10-PCS | Mod: 26,HCNC,, | Performed by: RADIOLOGY

## 2023-11-01 PROCEDURE — 71046 X-RAY EXAM CHEST 2 VIEWS: CPT | Mod: 26,HCNC,, | Performed by: RADIOLOGY

## 2023-11-13 ENCOUNTER — TELEPHONE (OUTPATIENT)
Dept: SPORTS MEDICINE | Facility: CLINIC | Age: 68
End: 2023-11-13
Payer: MEDICARE

## 2023-11-13 NOTE — TELEPHONE ENCOUNTER
Called patient in regards to appointment today. LVM to reschedule due to provider being out today.

## 2023-11-28 DIAGNOSIS — M25.561 CHRONIC PAIN OF RIGHT KNEE: ICD-10-CM

## 2023-11-28 DIAGNOSIS — G89.29 CHRONIC PAIN OF RIGHT KNEE: ICD-10-CM

## 2023-11-28 RX ORDER — MELOXICAM 7.5 MG/1
7.5 TABLET ORAL DAILY PRN
Qty: 30 TABLET | Refills: 0 | Status: SHIPPED | OUTPATIENT
Start: 2023-11-28 | End: 2024-01-24

## 2023-12-14 ENCOUNTER — PATIENT MESSAGE (OUTPATIENT)
Dept: INTERNAL MEDICINE | Facility: CLINIC | Age: 68
End: 2023-12-14
Payer: MEDICARE

## 2024-01-10 ENCOUNTER — TELEPHONE (OUTPATIENT)
Dept: INTERNAL MEDICINE | Facility: CLINIC | Age: 69
End: 2024-01-10
Payer: MEDICARE

## 2024-01-10 DIAGNOSIS — N30.90 CYSTITIS: Primary | ICD-10-CM

## 2024-01-10 RX ORDER — NITROFURANTOIN 25; 75 MG/1; MG/1
100 CAPSULE ORAL 2 TIMES DAILY
Qty: 10 CAPSULE | Refills: 0 | Status: SHIPPED | OUTPATIENT
Start: 2024-01-10 | End: 2024-01-15

## 2024-01-10 NOTE — TELEPHONE ENCOUNTER
Patient called the office expressing she has had urinary tract symptoms (increased frequency, dysuria, lower back pain) for the past week.  Patient denies hematuria or fever.    Patient denies constitutional symptoms.    She has had this in the past and has been treated with antibiotic.  She did not have any episodes during the last year.    At this point I will treat empirically with antibiotic (nitrofurantoin) for 5 days.    I have instructed patient if symptoms persist to give us a call so we can scheduled 4 month appointment to do labs (CBC, UA)  Patient verbalized understanding and agrees with the plan.

## 2024-01-24 ENCOUNTER — OFFICE VISIT (OUTPATIENT)
Dept: INTERNAL MEDICINE | Facility: CLINIC | Age: 69
End: 2024-01-24
Payer: MEDICARE

## 2024-01-24 ENCOUNTER — LAB VISIT (OUTPATIENT)
Dept: LAB | Facility: HOSPITAL | Age: 69
End: 2024-01-24
Attending: STUDENT IN AN ORGANIZED HEALTH CARE EDUCATION/TRAINING PROGRAM
Payer: MEDICARE

## 2024-01-24 VITALS
SYSTOLIC BLOOD PRESSURE: 134 MMHG | DIASTOLIC BLOOD PRESSURE: 65 MMHG | BODY MASS INDEX: 28.76 KG/M2 | HEIGHT: 65 IN | WEIGHT: 172.63 LBS | HEART RATE: 72 BPM

## 2024-01-24 DIAGNOSIS — M25.561 CHRONIC PAIN OF RIGHT KNEE: ICD-10-CM

## 2024-01-24 DIAGNOSIS — Z01.419 WELL WOMAN EXAM: ICD-10-CM

## 2024-01-24 DIAGNOSIS — Z78.0 ASYMPTOMATIC MENOPAUSAL STATE: ICD-10-CM

## 2024-01-24 DIAGNOSIS — Z00.00 ANNUAL PHYSICAL EXAM: Primary | ICD-10-CM

## 2024-01-24 DIAGNOSIS — M85.80 OSTEOPENIA, UNSPECIFIED LOCATION: ICD-10-CM

## 2024-01-24 DIAGNOSIS — R73.03 PREDIABETES: ICD-10-CM

## 2024-01-24 DIAGNOSIS — Z00.00 HEALTHCARE MAINTENANCE: ICD-10-CM

## 2024-01-24 DIAGNOSIS — N30.00 ACUTE CYSTITIS WITHOUT HEMATURIA: ICD-10-CM

## 2024-01-24 DIAGNOSIS — E78.2 MIXED HYPERLIPIDEMIA: ICD-10-CM

## 2024-01-24 DIAGNOSIS — R79.9 ABNORMAL FINDING OF BLOOD CHEMISTRY, UNSPECIFIED: ICD-10-CM

## 2024-01-24 DIAGNOSIS — N39.0 URINARY TRACT INFECTION WITHOUT HEMATURIA, SITE UNSPECIFIED: Primary | ICD-10-CM

## 2024-01-24 DIAGNOSIS — G89.29 CHRONIC PAIN OF RIGHT KNEE: ICD-10-CM

## 2024-01-24 PROBLEM — R07.89 NON-CARDIAC CHEST PAIN: Status: RESOLVED | Noted: 2023-03-07 | Resolved: 2024-01-24

## 2024-01-24 PROBLEM — R51.9 HEADACHE: Status: RESOLVED | Noted: 2023-05-22 | Resolved: 2024-01-24

## 2024-01-24 PROBLEM — R73.02 IMPAIRED GLUCOSE TOLERANCE: Status: RESOLVED | Noted: 2022-08-03 | Resolved: 2024-01-24

## 2024-01-24 PROBLEM — D64.9 NORMOCYTIC ANEMIA: Status: RESOLVED | Noted: 2023-01-13 | Resolved: 2024-01-24

## 2024-01-24 LAB
ALBUMIN SERPL BCP-MCNC: 3.6 G/DL (ref 3.5–5.2)
ALP SERPL-CCNC: 67 U/L (ref 55–135)
ALT SERPL W/O P-5'-P-CCNC: 17 U/L (ref 10–44)
ANION GAP SERPL CALC-SCNC: 5 MMOL/L (ref 8–16)
AST SERPL-CCNC: 20 U/L (ref 10–40)
BASOPHILS # BLD AUTO: 0.03 K/UL (ref 0–0.2)
BASOPHILS NFR BLD: 0.5 % (ref 0–1.9)
BILIRUB SERPL-MCNC: 0.4 MG/DL (ref 0.1–1)
BUN SERPL-MCNC: 17 MG/DL (ref 8–23)
CALCIUM SERPL-MCNC: 9.9 MG/DL (ref 8.7–10.5)
CHLORIDE SERPL-SCNC: 104 MMOL/L (ref 95–110)
CHOLEST SERPL-MCNC: 174 MG/DL (ref 120–199)
CHOLEST/HDLC SERPL: 2.9 {RATIO} (ref 2–5)
CO2 SERPL-SCNC: 31 MMOL/L (ref 23–29)
CREAT SERPL-MCNC: 0.8 MG/DL (ref 0.5–1.4)
DIFFERENTIAL METHOD BLD: NORMAL
EOSINOPHIL # BLD AUTO: 0.1 K/UL (ref 0–0.5)
EOSINOPHIL NFR BLD: 2.1 % (ref 0–8)
ERYTHROCYTE [DISTWIDTH] IN BLOOD BY AUTOMATED COUNT: 14.1 % (ref 11.5–14.5)
EST. GFR  (NO RACE VARIABLE): >60 ML/MIN/1.73 M^2
ESTIMATED AVG GLUCOSE: 123 MG/DL (ref 68–131)
GLUCOSE SERPL-MCNC: 93 MG/DL (ref 70–110)
HBA1C MFR BLD: 5.9 % (ref 4–5.6)
HCT VFR BLD AUTO: 38.8 % (ref 37–48.5)
HDLC SERPL-MCNC: 61 MG/DL (ref 40–75)
HDLC SERPL: 35.1 % (ref 20–50)
HGB BLD-MCNC: 12.4 G/DL (ref 12–16)
IMM GRANULOCYTES # BLD AUTO: 0.02 K/UL (ref 0–0.04)
IMM GRANULOCYTES NFR BLD AUTO: 0.3 % (ref 0–0.5)
LDLC SERPL CALC-MCNC: 93.6 MG/DL (ref 63–159)
LYMPHOCYTES # BLD AUTO: 2.2 K/UL (ref 1–4.8)
LYMPHOCYTES NFR BLD: 33 % (ref 18–48)
MCH RBC QN AUTO: 29 PG (ref 27–31)
MCHC RBC AUTO-ENTMCNC: 32 G/DL (ref 32–36)
MCV RBC AUTO: 91 FL (ref 82–98)
MONOCYTES # BLD AUTO: 0.6 K/UL (ref 0.3–1)
MONOCYTES NFR BLD: 8.5 % (ref 4–15)
NEUTROPHILS # BLD AUTO: 3.7 K/UL (ref 1.8–7.7)
NEUTROPHILS NFR BLD: 55.6 % (ref 38–73)
NONHDLC SERPL-MCNC: 113 MG/DL
NRBC BLD-RTO: 0 /100 WBC
PLATELET # BLD AUTO: 232 K/UL (ref 150–450)
PMV BLD AUTO: 11.1 FL (ref 9.2–12.9)
POTASSIUM SERPL-SCNC: 4.5 MMOL/L (ref 3.5–5.1)
PROT SERPL-MCNC: 7 G/DL (ref 6–8.4)
RBC # BLD AUTO: 4.27 M/UL (ref 4–5.4)
SODIUM SERPL-SCNC: 140 MMOL/L (ref 136–145)
TRIGL SERPL-MCNC: 97 MG/DL (ref 30–150)
WBC # BLD AUTO: 6.58 K/UL (ref 3.9–12.7)

## 2024-01-24 PROCEDURE — 85025 COMPLETE CBC W/AUTO DIFF WBC: CPT | Mod: HCNC | Performed by: STUDENT IN AN ORGANIZED HEALTH CARE EDUCATION/TRAINING PROGRAM

## 2024-01-24 PROCEDURE — 3044F HG A1C LEVEL LT 7.0%: CPT | Mod: HCNC,CPTII,S$GLB, | Performed by: STUDENT IN AN ORGANIZED HEALTH CARE EDUCATION/TRAINING PROGRAM

## 2024-01-24 PROCEDURE — 3075F SYST BP GE 130 - 139MM HG: CPT | Mod: HCNC,CPTII,S$GLB, | Performed by: STUDENT IN AN ORGANIZED HEALTH CARE EDUCATION/TRAINING PROGRAM

## 2024-01-24 PROCEDURE — 1160F RVW MEDS BY RX/DR IN RCRD: CPT | Mod: HCNC,CPTII,S$GLB, | Performed by: STUDENT IN AN ORGANIZED HEALTH CARE EDUCATION/TRAINING PROGRAM

## 2024-01-24 PROCEDURE — 80061 LIPID PANEL: CPT | Mod: HCNC | Performed by: STUDENT IN AN ORGANIZED HEALTH CARE EDUCATION/TRAINING PROGRAM

## 2024-01-24 PROCEDURE — 3078F DIAST BP <80 MM HG: CPT | Mod: HCNC,CPTII,S$GLB, | Performed by: STUDENT IN AN ORGANIZED HEALTH CARE EDUCATION/TRAINING PROGRAM

## 2024-01-24 PROCEDURE — 99397 PER PM REEVAL EST PAT 65+ YR: CPT | Mod: HCNC,S$GLB,, | Performed by: STUDENT IN AN ORGANIZED HEALTH CARE EDUCATION/TRAINING PROGRAM

## 2024-01-24 PROCEDURE — 3008F BODY MASS INDEX DOCD: CPT | Mod: HCNC,CPTII,S$GLB, | Performed by: STUDENT IN AN ORGANIZED HEALTH CARE EDUCATION/TRAINING PROGRAM

## 2024-01-24 PROCEDURE — 80053 COMPREHEN METABOLIC PANEL: CPT | Mod: HCNC | Performed by: STUDENT IN AN ORGANIZED HEALTH CARE EDUCATION/TRAINING PROGRAM

## 2024-01-24 PROCEDURE — 99999 PR PBB SHADOW E&M-EST. PATIENT-LVL IV: CPT | Mod: PBBFAC,HCNC,, | Performed by: STUDENT IN AN ORGANIZED HEALTH CARE EDUCATION/TRAINING PROGRAM

## 2024-01-24 PROCEDURE — 36415 COLL VENOUS BLD VENIPUNCTURE: CPT | Mod: HCNC | Performed by: STUDENT IN AN ORGANIZED HEALTH CARE EDUCATION/TRAINING PROGRAM

## 2024-01-24 PROCEDURE — 83036 HEMOGLOBIN GLYCOSYLATED A1C: CPT | Mod: HCNC | Performed by: STUDENT IN AN ORGANIZED HEALTH CARE EDUCATION/TRAINING PROGRAM

## 2024-01-24 PROCEDURE — 3288F FALL RISK ASSESSMENT DOCD: CPT | Mod: HCNC,CPTII,S$GLB, | Performed by: STUDENT IN AN ORGANIZED HEALTH CARE EDUCATION/TRAINING PROGRAM

## 2024-01-24 PROCEDURE — 1126F AMNT PAIN NOTED NONE PRSNT: CPT | Mod: HCNC,CPTII,S$GLB, | Performed by: STUDENT IN AN ORGANIZED HEALTH CARE EDUCATION/TRAINING PROGRAM

## 2024-01-24 PROCEDURE — 1101F PT FALLS ASSESS-DOCD LE1/YR: CPT | Mod: HCNC,CPTII,S$GLB, | Performed by: STUDENT IN AN ORGANIZED HEALTH CARE EDUCATION/TRAINING PROGRAM

## 2024-01-24 PROCEDURE — 1159F MED LIST DOCD IN RCRD: CPT | Mod: HCNC,CPTII,S$GLB, | Performed by: STUDENT IN AN ORGANIZED HEALTH CARE EDUCATION/TRAINING PROGRAM

## 2024-01-24 RX ORDER — PNEUMOCOCCAL 20-VALENT CONJUGATE VACCINE 2.2; 2.2; 2.2; 2.2; 2.2; 2.2; 2.2; 2.2; 2.2; 2.2; 2.2; 2.2; 2.2; 2.2; 2.2; 2.2; 4.4; 2.2; 2.2; 2.2 UG/.5ML; UG/.5ML; UG/.5ML; UG/.5ML; UG/.5ML; UG/.5ML; UG/.5ML; UG/.5ML; UG/.5ML; UG/.5ML; UG/.5ML; UG/.5ML; UG/.5ML; UG/.5ML; UG/.5ML; UG/.5ML; UG/.5ML; UG/.5ML; UG/.5ML; UG/.5ML
0.5 INJECTION, SUSPENSION INTRAMUSCULAR ONCE
Qty: 0.5 ML | Refills: 0 | Status: SHIPPED | OUTPATIENT
Start: 2024-01-24 | End: 2024-01-24

## 2024-01-24 NOTE — PROGRESS NOTES
Subjective:       Patient ID: Celina Azevedo is a 68 y.o. female.    Chief Complaint: Annual Exam    HPI    Celina Azevedo is a 68 y.o. female , Algerian speaking, with a history of:  Menopause  OA knee  Osteopenia  S/P left knee arthroscopy  Prediabetes  HLD      [Local Patient]  Originally from [place]  Lives in: [place] [address]       Patient comes to the clinic FOR HER ANNUAL physical exam.      Patient complains of lower urinary tract symptoms (burning urination, frequency.  She completed a treatment recently with nitrofurantoin, however she says that symptoms persist.    She denies flank pain, vaginal discharge, fever.    She has not had a recent gynecological exam.      Patient states that her headaches have resolved and she feels much better, she no longer has knee pain and she has not needed to take her prescribed meloxicam.    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    She has been compliant with her statin.    She interrupted her intake of calcium because she thought that was causing her urinary symptoms.    She recently had steady procedure in [place] (blepharoplasty) without complications.    No other complaints or concerns.    Since last seen by me:      10/30/23 Preoperative clearance  07/13/23 Chronic right knee pain - referral to PT  5/22/23 Headache    Changes in health or medications: No    Specialists visits and recommendations:        H/o ER or Urgent care visits:   NO    H/o Hospitalizations:  NO    H/o falls: None     Life events / lifestyle:   Had esthetic blepharoplasty in [place] December 2023      Most recent laboratories reviewed:    Recent Labs   Lab 05/22/23  1611 10/30/23  0922 01/24/24  0831   WBC 12.82 H 5.89 6.58   Hemoglobin 12.2 12.3 12.4   Hematocrit 39.6 39.3 38.8   MCV 93 91 91   Platelets 224 195 232       Recent Labs   Lab 03/08/22  1043 08/03/22  1519 01/17/23  0855 05/22/23  1611 10/30/23  0922  "11/01/23  0902 01/24/24  0831   Glucose 100   < > 93   < > 98 96 93   Sodium 139   < > 140   < > 142 141 140   Potassium 4.4   < > 4.3   < > 4.7 4.2 4.5   BUN 16   < > 20   < > 21 18 17   Creatinine 0.8   < > 0.8   < > 0.9 0.8 0.8   eGFR if non  >60.0  --   --   --   --   --   --    Total Bilirubin 0.4   < > 0.5  --  0.4  --  0.4   AST 20   < > 30  --  19  --  20   ALT 21   < > 37  --  18  --  17    < > = values in this interval not displayed.       Recent Labs   Lab 01/17/23  0855 05/22/23  1611 01/24/24  0831   Hemoglobin A1C 5.9 H 5.9 H 5.9 H       Recent Labs   Lab 08/03/22  1519 03/10/23  1020 01/24/24  0831   Cholesterol 146 239 H 174   Triglycerides 126 154 H 97   HDL 65 56 61   LDL Cholesterol 55.8 L 152.2 93.6   Non-HDL Cholesterol 81 183 113       Recent Labs   Lab 03/08/22  1043 12/31/22  1223   TSH 1.798 1.916       Recent Labs   Lab 03/08/22  1043 12/31/22  1223 11/01/23  0902   HIV 1/2 Ag/Ab  --  Non-reactive Non-reactive   Hepatitis C Ab Negative Non-reactive  --          Current medications:    Current Outpatient Medications:     atorvastatin (LIPITOR) 20 MG tablet, Take 1 tablet (20 mg total) by mouth once daily., Disp: 90 tablet, Rfl: 3    diphth,pertus,acell,,tetanus (BOOSTRIX) 2.5-8-5 Lf-mcg-Lf/0.5mL Susp, Inject 0.5 mLs into the muscle once. for 1 dose, Disp: 0.5 mL, Rfl: 0    pneumoc 20-marie conj-dip cr,PF, (PREVNAR 20, PF,) 0.5 mL Syrg injection, Inject 0.5 mLs into the muscle once. for 1 dose, Disp: 0.5 mL, Rfl: 0      Healthcare Maintenance:  Colon cancer screening:         Vaccinations:        Tetanus: NO       Pneumonia: NO       Zoster: NO       Influenza: NO       COVID vaccination: completed X 3     Depression screening: PHQ2 score = 0    Annual Wellness visit approx. Month: JANUARY ROS  11-point review of systems done. Negative except for detailed in the HPI.        Objective:      /65   Pulse 72   Ht 5' 5" (1.651 m)   Wt 78.3 kg (172 lb 9.9 oz)   BMI 28.73 " kg/m²      Physical Exam   General appearance: Good general aspect, NAD, conversant   Eyes and HEENT: Normal sclerae, moist mucous membranes, no thyromegaly or lymphadenopathy  Respiratory: No work of breathing, clear to auscultation bilaterally. No rales, rhonchi, wheezing, or rubs.  Cardiovascular: PMI not displaced. RRR. Normal S1, S2. No murmurs, rubs or gallops.  Abdomen and : Soft, non-tender, nondistended, BS, no hepatosplenomegaly, no masses.  Extremities: no edemas, no extremity lymphadenopathy, no clubbing, no cyanosis.  Nervous System: Alert and oriented x 3, good concentration, no deficits.  Skin: Normal temperature, turgor and texture; no rash, ulcers or subcutaneous nodules  Psych: Appropriate affect, alert and oriented to person, place and time          Assessment:       1. Annual physical exam  Assessment & Plan:  Patient is in overall good general health.  Stable medical conditions listed on the PMH.  Labs reviewed and patient notified.        2. Acute cystitis without hematuria  -     Urinalysis, Reflex to Urine Culture Urine, Clean Catch    3. Mixed hyperlipidemia  Assessment & Plan:  Lab Results   Component Value Date    CHOL 174 01/24/2024    CHOL 239 (H) 03/10/2023    CHOL 146 08/03/2022     Lab Results   Component Value Date    HDL 61 01/24/2024    HDL 56 03/10/2023    HDL 65 08/03/2022     Lab Results   Component Value Date    LDLCALC 93.6 01/24/2024    LDLCALC 152.2 03/10/2023    LDLCALC 55.8 (L) 08/03/2022     Lab Results   Component Value Date    TRIG 97 01/24/2024    TRIG 154 (H) 03/10/2023    TRIG 126 08/03/2022       Lab Results   Component Value Date    CHOLHDL 35.1 01/24/2024    CHOLHDL 23.4 03/10/2023    CHOLHDL 44.5 08/03/2022     Stable  On statin  Will continue      4. Prediabetes  Assessment & Plan:  Lab Results   Component Value Date    HGBA1C 5.9 (H) 01/24/2024     Continue low-carb diet    Orders:  -     HEMOGLOBIN A1C; Future; Expected date: 01/24/2024  -     BASIC  METABOLIC PANEL; Future; Expected date: 01/24/2024    5. Chronic pain of right knee  Assessment & Plan:  Improved      6. Osteopenia, unspecified location  -     DXA Bone Density Axial Skeleton 1 or more sites; Future; Expected date: 09/02/2024    7. Healthcare maintenance  Assessment & Plan:  Health care maintenance and core gaps reviewed and assessed with patient.  Vaccinations recommended:        Tetanus - O       Shingles - O       Influenza - O       Pneumonia - O  Colon cancer screening:   Colonoscopy: O  Lifestyle recommendations given.  Physical activity recommended.    Legend: Ordered (O), Declined (D), Current (C)      Orders:  -     Ambulatory referral/consult to Endo Procedure ; Future; Expected date: 01/25/2024  -     pneumoc 20-marie conj-dip cr,PF, (PREVNAR 20, PF,) 0.5 mL Syrg injection; Inject 0.5 mLs into the muscle once. for 1 dose  Dispense: 0.5 mL; Refill: 0  -     diphth,pertus,acell,,tetanus (BOOSTRIX) 2.5-8-5 Lf-mcg-Lf/0.5mL Susp; Inject 0.5 mLs into the muscle once. for 1 dose  Dispense: 0.5 mL; Refill: 0    8. Asymptomatic menopausal state  -     DXA Bone Density Axial Skeleton 1 or more sites; Future; Expected date: 09/02/2024    9. Well woman exam  -     Ambulatory referral/consult to Gynecology; Future; Expected date: 01/31/2024       Plan:         UA with reflex U Cx ordered  DEXA scan ordered (September)  Referral to GYN for well woman exam  Continue atorvastatin  Continue diet  Vaccinations ordered: Prevnar 20, tdap        Problem list updated  Medications reconciled  Education provided  Lifestyle recommendations given  AVS generated, explained, and sent to the patient.  RTC in : 6 months for f/u of prediabetes wit BMP, A1C, ordered              TRICIA BUNDY MD, MPH  Internal Medicine  [facility]

## 2024-01-24 NOTE — ASSESSMENT & PLAN NOTE
Lab Results   Component Value Date    CHOL 174 01/24/2024    CHOL 239 (H) 03/10/2023    CHOL 146 08/03/2022     Lab Results   Component Value Date    HDL 61 01/24/2024    HDL 56 03/10/2023    HDL 65 08/03/2022     Lab Results   Component Value Date    LDLCALC 93.6 01/24/2024    LDLCALC 152.2 03/10/2023    LDLCALC 55.8 (L) 08/03/2022     Lab Results   Component Value Date    TRIG 97 01/24/2024    TRIG 154 (H) 03/10/2023    TRIG 126 08/03/2022       Lab Results   Component Value Date    CHOLHDL 35.1 01/24/2024    CHOLHDL 23.4 03/10/2023    CHOLHDL 44.5 08/03/2022     Stable  On statin  Will continue

## 2024-01-24 NOTE — ASSESSMENT & PLAN NOTE
Health care maintenance and core gaps reviewed and assessed with patient.  Vaccinations recommended:        Tetanus - O       Shingles - O       Influenza - O       Pneumonia - O  Colon cancer screening:   Colonoscopy: O  Lifestyle recommendations given.  Physical activity recommended.    Legend: Ordered (O), Declined (D), Current (C)

## 2024-01-29 PROBLEM — Z00.00 HEALTHCARE MAINTENANCE: Status: RESOLVED | Noted: 2023-10-30 | Resolved: 2024-01-29

## 2024-02-27 ENCOUNTER — OFFICE VISIT (OUTPATIENT)
Dept: INTERNAL MEDICINE | Facility: CLINIC | Age: 69
End: 2024-02-27
Payer: MEDICARE

## 2024-02-27 ENCOUNTER — LAB VISIT (OUTPATIENT)
Dept: LAB | Facility: HOSPITAL | Age: 69
End: 2024-02-27
Attending: STUDENT IN AN ORGANIZED HEALTH CARE EDUCATION/TRAINING PROGRAM
Payer: MEDICARE

## 2024-02-27 VITALS
OXYGEN SATURATION: 100 % | SYSTOLIC BLOOD PRESSURE: 143 MMHG | BODY MASS INDEX: 28.91 KG/M2 | DIASTOLIC BLOOD PRESSURE: 64 MMHG | HEART RATE: 72 BPM | RESPIRATION RATE: 18 BRPM | HEIGHT: 65 IN | WEIGHT: 173.5 LBS

## 2024-02-27 DIAGNOSIS — R30.0 DYSURIA: Primary | ICD-10-CM

## 2024-02-27 DIAGNOSIS — R30.0 DYSURIA: ICD-10-CM

## 2024-02-27 DIAGNOSIS — N95.0 ABNORMAL VAGINAL BLEEDING IN POSTMENOPAUSAL PATIENT: ICD-10-CM

## 2024-02-27 LAB
BACTERIA #/AREA URNS AUTO: NORMAL /HPF
BILIRUB UR QL STRIP: NEGATIVE
CLARITY UR REFRACT.AUTO: CLEAR
COLOR UR AUTO: ABNORMAL
GLUCOSE UR QL STRIP: NEGATIVE
HGB UR QL STRIP: NEGATIVE
KETONES UR QL STRIP: NEGATIVE
LEUKOCYTE ESTERASE UR QL STRIP: ABNORMAL
MICROSCOPIC COMMENT: NORMAL
NITRITE UR QL STRIP: NEGATIVE
PH UR STRIP: 5 [PH] (ref 5–8)
PROT UR QL STRIP: NEGATIVE
RBC #/AREA URNS AUTO: 1 /HPF (ref 0–4)
SP GR UR STRIP: 1 (ref 1–1.03)
SQUAMOUS #/AREA URNS AUTO: 2 /HPF
URN SPEC COLLECT METH UR: ABNORMAL
WBC #/AREA URNS AUTO: 2 /HPF (ref 0–5)

## 2024-02-27 PROCEDURE — 1159F MED LIST DOCD IN RCRD: CPT | Mod: HCNC,CPTII,S$GLB, | Performed by: STUDENT IN AN ORGANIZED HEALTH CARE EDUCATION/TRAINING PROGRAM

## 2024-02-27 PROCEDURE — 3078F DIAST BP <80 MM HG: CPT | Mod: HCNC,CPTII,S$GLB, | Performed by: STUDENT IN AN ORGANIZED HEALTH CARE EDUCATION/TRAINING PROGRAM

## 2024-02-27 PROCEDURE — 3044F HG A1C LEVEL LT 7.0%: CPT | Mod: HCNC,CPTII,S$GLB, | Performed by: STUDENT IN AN ORGANIZED HEALTH CARE EDUCATION/TRAINING PROGRAM

## 2024-02-27 PROCEDURE — 3008F BODY MASS INDEX DOCD: CPT | Mod: HCNC,CPTII,S$GLB, | Performed by: STUDENT IN AN ORGANIZED HEALTH CARE EDUCATION/TRAINING PROGRAM

## 2024-02-27 PROCEDURE — 1160F RVW MEDS BY RX/DR IN RCRD: CPT | Mod: HCNC,CPTII,S$GLB, | Performed by: STUDENT IN AN ORGANIZED HEALTH CARE EDUCATION/TRAINING PROGRAM

## 2024-02-27 PROCEDURE — 3288F FALL RISK ASSESSMENT DOCD: CPT | Mod: HCNC,CPTII,S$GLB, | Performed by: STUDENT IN AN ORGANIZED HEALTH CARE EDUCATION/TRAINING PROGRAM

## 2024-02-27 PROCEDURE — 81001 URINALYSIS AUTO W/SCOPE: CPT | Mod: HCNC | Performed by: STUDENT IN AN ORGANIZED HEALTH CARE EDUCATION/TRAINING PROGRAM

## 2024-02-27 PROCEDURE — 99214 OFFICE O/P EST MOD 30 MIN: CPT | Mod: HCNC,S$GLB,, | Performed by: STUDENT IN AN ORGANIZED HEALTH CARE EDUCATION/TRAINING PROGRAM

## 2024-02-27 PROCEDURE — 1101F PT FALLS ASSESS-DOCD LE1/YR: CPT | Mod: HCNC,CPTII,S$GLB, | Performed by: STUDENT IN AN ORGANIZED HEALTH CARE EDUCATION/TRAINING PROGRAM

## 2024-02-27 PROCEDURE — 99999 PR PBB SHADOW E&M-EST. PATIENT-LVL III: CPT | Mod: PBBFAC,HCNC,, | Performed by: STUDENT IN AN ORGANIZED HEALTH CARE EDUCATION/TRAINING PROGRAM

## 2024-02-27 PROCEDURE — 1126F AMNT PAIN NOTED NONE PRSNT: CPT | Mod: HCNC,CPTII,S$GLB, | Performed by: STUDENT IN AN ORGANIZED HEALTH CARE EDUCATION/TRAINING PROGRAM

## 2024-02-27 PROCEDURE — 3077F SYST BP >= 140 MM HG: CPT | Mod: HCNC,CPTII,S$GLB, | Performed by: STUDENT IN AN ORGANIZED HEALTH CARE EDUCATION/TRAINING PROGRAM

## 2024-02-27 NOTE — PROGRESS NOTES
Subjective:       Patient ID: Celina Azevedo is a 68 y.o. female.    Chief Complaint: No chief complaint on file.    HPI    Celina Azevedo is a 68 y.o. female , Armenian speaking, with a history of:  Menopause  OA knee  Osteopenia  S/P left knee arthroscopy  Prediabetes  HLD      [Local Patient]  Originally from [place]  Lives in: [place] [address]       Patient comes to the clinic complaining of vaginal spotting.      She comes accompanied by her daughter who is a physician from [place] (pediatric surgeon).      Patient states that for the past 2 weeks she has had intermittent vaginal bleeding, she has noticed blood in her underwear and pants liner, sometimes it also happens when she cleans after she uses the bathroom.      She completed a recent treatment with nitrofurantoin for a UTI.  She reports that she has continued to have dysuria and burning feeling in her vulva.      She denies fever, abdominal pain or flank pain.      She denies changes in her weight.      She reports that in 2015 she had a partial hysterectomy, but she has not sure about why.      No other complaints or concerns      Since last seen by me:      10/30/23 Preoperative clearance  07/13/23 Chronic right knee pain - referral to PT  5/22/23 Headache      Changes in health or medications: No    Specialists visits and recommendations:        H/o ER or Urgent care visits:   NO    H/o Hospitalizations:  NO    H/o falls: None     Life events / lifestyle:   Had esthetic blepharoplasty in [place] December 2023       Most recent laboratories reviewed:    Recent Labs   Lab 05/22/23  1611 10/30/23  0922 01/24/24  0831   WBC 12.82 H 5.89 6.58   Hemoglobin 12.2 12.3 12.4   Hematocrit 39.6 39.3 38.8   MCV 93 91 91   Platelets 224 195 232       Recent Labs   Lab 03/08/22  1043 08/03/22  1519 01/17/23  0855 05/22/23  1611 10/30/23  0922 11/01/23  0902 01/24/24  0831   Glucose 100   < > 93   < > 98 96 93   Sodium 139   < > 140   < > 142 141 140   Potassium 4.4   < >  "4.3   < > 4.7 4.2 4.5   BUN 16   < > 20   < > 21 18 17   Creatinine 0.8   < > 0.8   < > 0.9 0.8 0.8   eGFR if non  >60.0  --   --   --   --   --   --    Total Bilirubin 0.4   < > 0.5  --  0.4  --  0.4   AST 20   < > 30  --  19  --  20   ALT 21   < > 37  --  18  --  17    < > = values in this interval not displayed.       Recent Labs   Lab 01/17/23  0855 05/22/23  1611 01/24/24  0831   Hemoglobin A1C 5.9 H 5.9 H 5.9 H       Recent Labs   Lab 08/03/22  1519 03/10/23  1020 01/24/24  0831   Cholesterol 146 239 H 174   Triglycerides 126 154 H 97   HDL 65 56 61   LDL Cholesterol 55.8 L 152.2 93.6   Non-HDL Cholesterol 81 183 113       Recent Labs   Lab 03/08/22  1043 12/31/22  1223   TSH 1.798 1.916       Recent Labs   Lab 03/08/22  1043 12/31/22  1223 11/01/23  0902   HIV 1/2 Ag/Ab  --  Non-reactive Non-reactive   Hepatitis C Ab Negative Non-reactive  --          Current medications:    Current Outpatient Medications:     atorvastatin (LIPITOR) 20 MG tablet, Take 1 tablet (20 mg total) by mouth once daily., Disp: 90 tablet, Rfl: 3      Healthcare Maintenance:  Colon cancer screening:         Vaccinations:        Tetanus: NO       Pneumonia: NO       Zoster: NO       Influenza: NO       COVID vaccination: completed X 3                Depression screening: PHQ2 score = 0     Annual Wellness visit approx. Month: JANUARY ROS 11-point review of systems done. Negative except for detailed in the HPI.        Objective:      BP (!) 143/64 (BP Location: Right arm, Patient Position: Sitting, BP Method: Medium (Automatic))   Pulse 72   Resp 18   Ht 5' 5" (1.651 m)   Wt 78.7 kg (173 lb 8 oz)   SpO2 100%   BMI 28.87 kg/m²      Physical Exam   General appearance: Good general aspect, NAD, conversant   Eyes and HEENT: Normal sclerae, moist mucous membranes, no thyromegaly or lymphadenopathy  Respiratory: No work of breathing, clear to auscultation bilaterally. No rales, rhonchi, wheezing, or " rubs.  Cardiovascular: PMI not displaced. RRR. Normal S1, S2. No murmurs, rubs or gallops.  Abdomen and : Soft, non-tender, nondistended, BS, no hepatosplenomegaly, no masses. No CVA tenderness.  Extremities: no edemas, no extremity lymphadenopathy, no clubbing, no cyanosis.  Nervous System: Alert and oriented x 3, good concentration, no deficits.  Skin: Normal temperature, turgor and texture; no rash, ulcers or subcutaneous nodules  Psych: Appropriate affect, alert and oriented to person, place and time          Assessment:       1. Dysuria  Comments:  Persistent dysuria.  UA ordered  Patient prefers to have physical exam by Gynecology.  Referred again  Orders:  -     Urinalysis, Reflex to Urine Culture Urine, Clean Catch; Future; Expected date: 02/27/2024    2. Abnormal vaginal bleeding in postmenopausal patient  Comments:  Patient declined gynecological physical exam today, she prefers to have this done by Gynecology.    Referred to gynecology  Orders:  -     Ambulatory referral/consult to Gynecology; Future; Expected date: 03/05/2024       Plan:         UA, Ucx  Referral to GYN        Problem list updated  Medications reconciled  Education provided  Lifestyle recommendations given  AVS generated, explained, and sent to the patient.  RTC in : 6 months as planned for f/u of prediabetes, labs ordered.            TRICIA BUNDY MD, MPH  Internal Medicine  [facility]

## 2024-03-11 ENCOUNTER — OFFICE VISIT (OUTPATIENT)
Dept: OBSTETRICS AND GYNECOLOGY | Facility: CLINIC | Age: 69
End: 2024-03-11
Payer: MEDICARE

## 2024-03-11 VITALS
DIASTOLIC BLOOD PRESSURE: 83 MMHG | SYSTOLIC BLOOD PRESSURE: 160 MMHG | WEIGHT: 171.94 LBS | BODY MASS INDEX: 28.62 KG/M2 | HEART RATE: 66 BPM

## 2024-03-11 DIAGNOSIS — R30.0 DYSURIA: ICD-10-CM

## 2024-03-11 DIAGNOSIS — N90.89 VULVAR IRRITATION: Primary | ICD-10-CM

## 2024-03-11 DIAGNOSIS — N95.0 ABNORMAL VAGINAL BLEEDING IN POSTMENOPAUSAL PATIENT: ICD-10-CM

## 2024-03-11 LAB
BILIRUB SERPL-MCNC: NORMAL MG/DL
BLOOD, POC UA: NORMAL
GLUCOSE UR QL STRIP: NORMAL
KETONES UR QL STRIP: NORMAL
LEUKOCYTE ESTERASE URINE, POC: NORMAL
NITRITE, POC UA: NORMAL
PH, POC UA: 5
PROTEIN, POC: NORMAL
SPECIFIC GRAVITY, POC UA: 1.02
UROBILINOGEN, POC UA: NORMAL

## 2024-03-11 PROCEDURE — 99459 PELVIC EXAMINATION: CPT | Mod: HCNC,S$GLB,, | Performed by: NURSE PRACTITIONER

## 2024-03-11 PROCEDURE — 3044F HG A1C LEVEL LT 7.0%: CPT | Mod: HCNC,CPTII,S$GLB, | Performed by: NURSE PRACTITIONER

## 2024-03-11 PROCEDURE — 87086 URINE CULTURE/COLONY COUNT: CPT | Mod: HCNC | Performed by: NURSE PRACTITIONER

## 2024-03-11 PROCEDURE — 1101F PT FALLS ASSESS-DOCD LE1/YR: CPT | Mod: HCNC,CPTII,S$GLB, | Performed by: NURSE PRACTITIONER

## 2024-03-11 PROCEDURE — 3008F BODY MASS INDEX DOCD: CPT | Mod: HCNC,CPTII,S$GLB, | Performed by: NURSE PRACTITIONER

## 2024-03-11 PROCEDURE — 87529 HSV DNA AMP PROBE: CPT | Mod: 59,HCNC | Performed by: NURSE PRACTITIONER

## 2024-03-11 PROCEDURE — 1159F MED LIST DOCD IN RCRD: CPT | Mod: HCNC,CPTII,S$GLB, | Performed by: NURSE PRACTITIONER

## 2024-03-11 PROCEDURE — 99999 PR PBB SHADOW E&M-EST. PATIENT-LVL III: CPT | Mod: PBBFAC,HCNC,, | Performed by: NURSE PRACTITIONER

## 2024-03-11 PROCEDURE — 81514 NFCT DS BV&VAGINITIS DNA ALG: CPT | Mod: HCNC | Performed by: NURSE PRACTITIONER

## 2024-03-11 PROCEDURE — 3079F DIAST BP 80-89 MM HG: CPT | Mod: HCNC,CPTII,S$GLB, | Performed by: NURSE PRACTITIONER

## 2024-03-11 PROCEDURE — 99214 OFFICE O/P EST MOD 30 MIN: CPT | Mod: HCNC,S$GLB,, | Performed by: NURSE PRACTITIONER

## 2024-03-11 PROCEDURE — 1126F AMNT PAIN NOTED NONE PRSNT: CPT | Mod: HCNC,CPTII,S$GLB, | Performed by: NURSE PRACTITIONER

## 2024-03-11 PROCEDURE — 81003 URINALYSIS AUTO W/O SCOPE: CPT | Mod: QW,HCNC,S$GLB, | Performed by: NURSE PRACTITIONER

## 2024-03-11 PROCEDURE — 3077F SYST BP >= 140 MM HG: CPT | Mod: HCNC,CPTII,S$GLB, | Performed by: NURSE PRACTITIONER

## 2024-03-11 PROCEDURE — 3288F FALL RISK ASSESSMENT DOCD: CPT | Mod: HCNC,CPTII,S$GLB, | Performed by: NURSE PRACTITIONER

## 2024-03-11 RX ORDER — ESTRADIOL 0.1 MG/G
.5-1 CREAM VAGINAL
Qty: 42.5 G | Refills: 2 | Status: SHIPPED | OUTPATIENT
Start: 2024-03-11 | End: 2025-05-31

## 2024-03-11 RX ORDER — VALACYCLOVIR HYDROCHLORIDE 1 G/1
1000 TABLET, FILM COATED ORAL 2 TIMES DAILY
Qty: 14 TABLET | Refills: 0 | Status: SHIPPED | OUTPATIENT
Start: 2024-03-11 | End: 2024-03-18

## 2024-03-11 NOTE — PROGRESS NOTES
Chief Complaint: Vaginal bleeding     HPI:      Celina is a 68 y.o.  who presents today abnormal bleeding. She is here today with her daughter.  used through throughout the entirety of the visit.     2 weeks ago - she started having burning with urination   She was treated for a UTI but once she finished antibiotic she continued to have a burning sensation   When she urinates - notices bleeding when she wipes - on toilet paper - just a small amount     C/o a lot of vaginal dryness. Not sexually active due to the burnign sensation.     No LMP recorded. Patient has had a hysterectomy w/o oophorectomy d/t imbedded IUD    Denies history of abnormal pap tests     OB History          1    Para   1    Term   1            AB        Living             SAB        IAB        Ectopic        Multiple        Live Births                   ROS:     GENERAL: Feeling well overall.   CARDIOVASCULAR: Denies palpitations or chest pain.   RESPIRATORY: Denies shortness of breath.  BREASTS: see HPI.  ABDOMEN: Denies constipation, diarrhea, blood in stool.  URINARY: see HPI.  REPRODUCTIVE: see HPI.  PSYCHIATRIC: Denies uncontrolled depression or anxiety.    Physical Exam:      Physical Exam     BP (!) 160/83   Pulse 66   Wt 78 kg (171 lb 15.3 oz)   BMI 28.62 kg/m²   Body mass index is 28.62 kg/m².     APPEARANCE: Well nourished, well developed, in no acute distress.  PSYCH: Appropriate mood and affect.  PELVIC: Significant thinning of vulvar with sloughing noted. Normal hair distribution.  Adequate perineal body, normal urethral meatus.  Vagina  smooth . Without lesions. Vagina without abnormal discharge.  Normal appearing vaginal cuff.  Cervix without lesions, abnormal discharge, or tenderness.. No significant cystocele or rectocele.  Bimanual exam shows no midline or adnexal masses.      A female chaperone was present for the pelvic exam.       Assessment/Plan:     Vulvar irritation  -      Vaginosis Screen by DNA Probe  -     HSV by Rapid PCR, Non-Blood [facility]; Vagina (Vulva)    Abnormal vaginal bleeding in postmenopausal patient  Comments:  Patient declined gynecological physical exam today, she prefers to have this done by Gynecology.    Referred to gynecology  Orders:  -     Ambulatory referral/consult to Gynecology  -     POCT URINALYSIS  -     Urine culture    Dysuria  -     Ambulatory referral/consult to Gynecology    Other orders  -     valACYclovir (VALTREX) 1000 MG tablet; Take 1 tablet (1,000 mg total) by mouth 2 (two) times daily. for 7 days  Dispense: 14 tablet; Refill: 0  -     estradioL (ESTRACE) 0.01 % (0.1 mg/gram) vaginal cream; Place 0.5-1 g vaginally twice a week. For the first two weeks, place 0.5-1 g vaginally every night, then twice a week at night  Dispense: 42.5 g; Refill: 2    PLAN:    Differentials include UTI, genital HSV outbreak, vaginitis, dermatitis versus atrophy   Urine culture ordered. HSV swab collected from sloughing tissue. BV/Yeast/Trich swab collected.   Will treat empirically for genital HSV outbreak with Valacyclovir 1 gram twice a day for 7 days.   Discussed with patient and her daughter that the most likely etiology of her symptoms are related to vulvovaginal atrophy and recommended treatment for this is vaginal estrogen. Prescription sent today.   Also discussed general vulvovaginal skin care with patient and her daughter.     Follow up if symptoms worsen or fail to improve.    Counseling:     Patient was counseled today on the recommendation for yearly wellness exams, importance of breast self awareness and annual mammograms, as well as the current ASCCP pap guidelines. She was counseled on importance of regular exercise. She is to see her PCP for other health maintenance.     Use of the [facility] Patient Portal discussed and encouraged during today's visit.

## 2024-03-12 ENCOUNTER — TELEPHONE (OUTPATIENT)
Dept: OBSTETRICS AND GYNECOLOGY | Facility: CLINIC | Age: 69
End: 2024-03-12
Payer: MEDICARE

## 2024-03-12 DIAGNOSIS — B96.89 BACTERIAL VAGINOSIS: Primary | ICD-10-CM

## 2024-03-12 DIAGNOSIS — N76.0 BACTERIAL VAGINOSIS: Primary | ICD-10-CM

## 2024-03-12 LAB
BACTERIA UR CULT: NO GROWTH
BACTERIAL VAGINOSIS DNA: POSITIVE
CANDIDA GLABRATA DNA: NEGATIVE
CANDIDA KRUSEI DNA: NEGATIVE
CANDIDA RRNA VAG QL PROBE: NEGATIVE
T VAGINALIS RRNA GENITAL QL PROBE: NEGATIVE

## 2024-03-12 RX ORDER — METRONIDAZOLE 500 MG/1
500 TABLET ORAL EVERY 12 HOURS
Qty: 14 TABLET | Refills: 0 | Status: SHIPPED | OUTPATIENT
Start: 2024-03-12 | End: 2024-03-19

## 2024-03-12 NOTE — TELEPHONE ENCOUNTER
Interpretor Alan  ID # [id number]    Unable to get in touch with patient. Will attempt again at a later time.     Prescription sent for Flagyl or Metronidazole 500 mg twice a day for 7 days.

## 2024-03-12 NOTE — TELEPHONE ENCOUNTER
Swiss Interpretor Barak  ID #[id number]    Spoke to patient's daughter in law. Informed her of bacterial vaginosis infection and that prescription has been sent to her pharmacy. All questions answered.

## 2024-03-14 LAB
HSV1 DNA SPEC QL NAA+PROBE: NEGATIVE
HSV2 DNA SPEC QL NAA+PROBE: NEGATIVE
SPECIMEN SOURCE: NORMAL

## 2024-04-29 PROBLEM — Z00.00 ANNUAL PHYSICAL EXAM: Status: RESOLVED | Noted: 2024-01-24 | Resolved: 2024-04-29

## 2024-06-10 ENCOUNTER — TELEPHONE (OUTPATIENT)
Dept: INTERNAL MEDICINE | Facility: CLINIC | Age: 69
End: 2024-06-10
Payer: MEDICARE

## 2024-06-21 ENCOUNTER — OFFICE VISIT (OUTPATIENT)
Dept: INTERNAL MEDICINE | Facility: CLINIC | Age: 69
End: 2024-06-21
Payer: MEDICARE

## 2024-06-21 VITALS
BODY MASS INDEX: 29.09 KG/M2 | WEIGHT: 174.63 LBS | SYSTOLIC BLOOD PRESSURE: 138 MMHG | HEIGHT: 65 IN | DIASTOLIC BLOOD PRESSURE: 72 MMHG | HEART RATE: 72 BPM

## 2024-06-21 DIAGNOSIS — M25.562 CHRONIC PAIN OF BOTH KNEES: Primary | ICD-10-CM

## 2024-06-21 DIAGNOSIS — G89.29 CHRONIC PAIN OF BOTH KNEES: Primary | ICD-10-CM

## 2024-06-21 DIAGNOSIS — M25.561 CHRONIC PAIN OF BOTH KNEES: Primary | ICD-10-CM

## 2024-06-21 DIAGNOSIS — H00.012 HORDEOLUM EXTERNUM OF RIGHT LOWER EYELID: ICD-10-CM

## 2024-06-21 PROCEDURE — 99999 PR PBB SHADOW E&M-EST. PATIENT-LVL III: CPT | Mod: PBBFAC,HCNC,, | Performed by: STUDENT IN AN ORGANIZED HEALTH CARE EDUCATION/TRAINING PROGRAM

## 2024-06-21 NOTE — PROGRESS NOTES
Subjective:       Patient ID: Celina Azevedo is a 68 y.o. female.    Chief Complaint: Annual Exam (Knee inflammation & pain)    HPI    Celina Azevedo is a 68 y.o. female , Hungarian speaking, with a history of:  Menopause  OA knee  Osteopenia  S/P left knee arthroscopy  Prediabetes  HLD      [Local Patient]  Originally from [place]  Lives in: [place] [address]       Patient comes to the clinic complaining of knee pain    She states that she has had mild to moderate BL knee pain, not incapacitating. She has been taking ibuprofen for pain. Pain resolves.  She has also noted swelling R >L.  H/o R knee arthroscopy in 2023.  No trauma.    No other complaints or concerns      Since last seen by me:      10/30/23 Preoperative clearance  07/13/23 Chronic right knee pain - referral to PT  5/22/23 Headache      Changes in health or medications: No    Specialists visits and recommendations:        H/o ER or Urgent care visits:   NO    H/o Hospitalizations:  NO    H/o falls: None     Life events / lifestyle:   Had esthetic blepharoplasty in [place] December 2023       Most recent laboratories reviewed:    Recent Labs   Lab 05/22/23  1611 10/30/23  0922 01/24/24  0831   WBC 12.82 H 5.89 6.58   Hemoglobin 12.2 12.3 12.4   Hematocrit 39.6 39.3 38.8   MCV 93 91 91   Platelets 224 195 232       Recent Labs   Lab 03/08/22  1043 08/03/22  1519 01/17/23  0855 05/22/23  1611 10/30/23  0922 11/01/23  0902 01/24/24  0831   Glucose 100   < > 93   < > 98 96 93   Sodium 139   < > 140   < > 142 141 140   Potassium 4.4   < > 4.3   < > 4.7 4.2 4.5   BUN 16   < > 20   < > 21 18 17   Creatinine 0.8   < > 0.8   < > 0.9 0.8 0.8   eGFR if non  >60.0  --   --   --   --   --   --    Total Bilirubin 0.4   < > 0.5  --  0.4  --  0.4   AST 20   < > 30  --  19  --  20   ALT 21   < > 37  --  18  --  17    < > = values in this interval not displayed.       Recent Labs   Lab 01/17/23  0855 05/22/23  1611 01/24/24  0831   Hemoglobin A1C 5.9 H 5.9 H  "5.9 H       Recent Labs   Lab 08/03/22  1519 03/10/23  1020 01/24/24  0831   Cholesterol 146 239 H 174   Triglycerides 126 154 H 97   HDL 65 56 61   LDL Cholesterol 55.8 L 152.2 93.6   Non-HDL Cholesterol 81 183 113       Recent Labs   Lab 03/08/22  1043 12/31/22  1223   TSH 1.798 1.916       Recent Labs   Lab 03/08/22  1043 12/31/22  1223 11/01/23  0902   HIV 1/2 Ag/Ab  --  Non-reactive Non-reactive   Hepatitis C Ab Negative Non-reactive  --          Current medications:    Current Outpatient Medications:     atorvastatin (LIPITOR) 20 MG tablet, Take 1 tablet (20 mg total) by mouth once daily., Disp: 90 tablet, Rfl: 3    estradioL (ESTRACE) 0.01 % (0.1 mg/gram) vaginal cream, Place 0.5-1 g vaginally twice a week. For the first two weeks, place 0.5-1 g vaginally every night, then twice a week at night, Disp: 42.5 g, Rfl: 2    valACYclovir (VALTREX) 1000 MG tablet, Take 1 tablet (1,000 mg total) by mouth 2 (two) times daily. for 7 days, Disp: 14 tablet, Rfl: 0      Healthcare Maintenance:  Colon cancer screening:         Vaccinations:        Tetanus: NO       Pneumonia: NO       Zoster: NO       Influenza: NO       COVID vaccination: completed X 3                Depression screening: PHQ2 score = 0     Annual Wellness visit approx. Month: JANUARY    ROS  11-point review of systems done. Negative except for detailed in the HPI.  R lower lid mass      Objective:      /72   Pulse 72   Ht 5' 5" (1.651 m)   Wt 79.2 kg (174 lb 9.7 oz)   BMI 29.06 kg/m²      Physical Exam   General appearance: Good general aspect, NAD, conversant   Eyes and HEENT: Normal sclerae, moist mucous membranes, no thyromegaly or lymphadenopathy  R lower eyelid hordeolum noted  Respiratory: No work of breathing, clear to auscultation bilaterally. No rales, rhonchi, wheezing, or rubs.  Cardiovascular: PMI not displaced. RRR. Normal S1, S2. No murmurs, rubs or gallops.  Abdomen and : Soft, non-tender, nondistended, BS, no " hepatosplenomegaly, no masses. No CVA tenderness.  Extremities: no edemas, no extremity lymphadenopathy, no clubbing, no cyanosis.  BL knee edema (mild)  BLLE varicose veins  Nervous System: Alert and oriented x 3, good concentration, no deficits.  Skin: Normal temperature, turgor and texture; no rash, ulcers or subcutaneous nodules  Psych: Appropriate affect, alert and oriented to person, place and time          Assessment:       1. Chronic pain of both knees  Assessment & Plan:  Continue NSAIDs  Topical ice  Referral to ortho for a follow up    Orders:  -     Ambulatory referral/consult to Orthopedics; Future; Expected date: 06/28/2024    2. Hordeolum externum of right lower eyelid  Assessment & Plan:  I have recommended to apply warm compresses over the right eye.         Plan:         Referral to Orthopedics  NSAIDs for pain        Problem list updated  Medications reconciled  Education provided  Lifestyle recommendations given  AVS generated, explained, and sent to the patient.  RTC in : July as planned for f/u of prediabetes, labs ordered.            TRICIA BUNDY MD, MPH  Internal Medicine  [facility]

## 2024-06-23 NOTE — PROGRESS NOTES
CC:  Bilateral knee pain    DATE OF PROCEDURE: 4/1/2022   PROCEDURE PERFORMED:   Left knee arthroscopic partial lateral meniscectomy (CPT 01420)      Left knee arthroscopic chondroplasty   Left knee arthroscopic partial synovectomy    Celina Azevedo, presents today for follow up evaluation of her right knee. At last appointment, 7/20/2023, patient underwent right intra-articular knee CSI. Patient reports 100% relief up until a few months ago. Over the past few months she has experienced worsening R>L knee pain. She rates the right knee pain 8/10. The pain is worse at night and keeps her up at night. Waxing/waning swelling and effusions. She is taking tylenol and alleve as needed for pain which provide partial relief.  Patient does not report any new incidents or injuries since their last appointment. Here today to discuss treatment options.     Prior Hx 7/20/2023:  Celina who presents for MRI review of right knee.  She has had some increased pain after a fall.  At last visit she had needle aspiration for effusion and has taken some oral medication.  Feeling better but still has pain.    Prior Hx 6/30/23:  68 y.o. who returns to see me with a new complaint.  She fell this past Saturday on her flexed right knee and was unable to bear much weight afterwards.  She was seen in the emergency department.  X-rays at that time were negative.  She was given a knee immobilizer and told to follow up with Orthopedics.  She presents to clinic today with her .  She is partially English speaking.  We had the assistance of a  today as well (#[id number]).  She has been ambulating with a walker.  Unable to bear much weight on the right lower extremity.  Tylenol and anti-inflammatory medication as needed.  Has included ice.  Localizes pain anteromedially.    REVIEW OF SYSTEMS:   Constitution: Negative. Negative for chills, fever and night sweats.    Hematologic/Lymphatic: Negative for bleeding problem. Does not  bruise/bleed easily.   Skin: Negative for dry skin, itching and rash.   Musculoskeletal: Negative for falls. Positive for bilateral knee pain and muscle weakness.     All other review of symptoms were reviewed and found to be noncontributory.     PAST MEDICAL HISTORY:   Past Medical History:   Diagnosis Date    Headache 05/22/2023    Hyperlipidemia      PAST SURGICAL HISTORY:   Past Surgical History:   Procedure Laterality Date    ARTHROSCOPIC CHONDROPLASTY OF KNEE JOINT Left 04/01/2022    Procedure: ARTHROSCOPY, KNEE, WITH CHONDROPLASTY;  Surgeon: JEANINE Fuentes MD;  Location: [facility] OR;  Service: Orthopedics;  Laterality: Left;    HYSTERECTOMY  2015    partial    KNEE ARTHROSCOPY W/ MENISCECTOMY Left 04/01/2022    Procedure: ARTHROSCOPY, KNEE, WITH MENISCECTOMY partial lateral;  Surgeon: JEANINE Fuentes MD;  Location: [facility] OR;  Service: Orthopedics;  Laterality: Left;  regional with catheter- adductor  pericapsular injection: 30cc MILTON     FAMILY HISTORY:   No family history on file.    SOCIAL HISTORY:   Social History     Socioeconomic History    Marital status:    Tobacco Use    Smoking status: Never    Smokeless tobacco: Never   Substance and Sexual Activity    Alcohol use: No    Drug use: No     Social Determinants of Health     Financial Resource Strain: Low Risk  (1/13/2023)    Overall Financial Resource Strain (CARDIA)     Difficulty of Paying Living Expenses: Not hard at all   Food Insecurity: No Food Insecurity (1/13/2023)    Hunger Vital Sign     Worried About Running Out of Food in the Last Year: Never true     Ran Out of Food in the Last Year: Never true   Transportation Needs: No Transportation Needs (1/13/2023)    PRAPARE - Transportation     Lack of Transportation (Medical): No     Lack of Transportation (Non-Medical): No   Physical Activity: Inactive (1/13/2023)    Exercise Vital Sign     Days of Exercise per Week: 0 days     Minutes of Exercise per Session: 0 min    Stress: No Stress Concern Present (1/13/2023)    [employer] - Occupational Stress Questionnaire     Feeling of Stress : Not at all   Housing Stability: Low Risk  (1/13/2023)    Housing Stability Vital Sign     Unable to Pay for Housing in the Last Year: No     Number of Places Lived in the Last Year: 1     Unstable Housing in the Last Year: No     MEDICATIONS:     Current Outpatient Medications:     atorvastatin (LIPITOR) 20 MG tablet, Take 1 tablet (20 mg total) by mouth once daily. (Patient not taking: Reported on 6/24/2024), Disp: 90 tablet, Rfl: 3    valACYclovir (VALTREX) 1000 MG tablet, Take 1 tablet (1,000 mg total) by mouth 2 (two) times daily. for 7 days, Disp: 14 tablet, Rfl: 0    ALLERGIES:   Review of patient's allergies indicates:  No Known Allergies     PHYSICAL EXAMINATION:  /76   Pulse 69   Wt 79 kg (174 lb 2.6 oz)   BMI 28.98 kg/m²   General: Well-developed well-nourished 68 y.o. femalein no acute distress   Cardiovascular: Regular rhythm by palpation of distal pulse, normal color and temperature, no concerning varicosities on symptomatic side   Lungs: No labored breathing or wheezing appreciated   Neuro: Alert and oriented ×3   Psychiatric: well oriented to person, place and time, demonstrates normal mood and affect   Skin: No rashes, lesions or ulcers, normal temperature, turgor, and texture on involved extremity    Ortho/SPM Exam  Examination of both knees demonstrates some crepitus on range of motion.  Positive patellar crepitus and grind test.  Pain specifically over the lateral joint lines bilaterally.  She does have some standing genu valgum.  Pain with forced flexion and extension.  Range of motion is from near full extension to 120° of flexion.  Ligamentously stable.  No pain with passive internal and external rotation of the hips.  Evidence of prior right knee arthroscopy.  Well-healed incisions.    IMAGING:  X-rays including standing, weight  bearing AP and flexion bilateral knees, LEFT and RIGHT knee lateral and sunrise views ordered and images reviewed by me show:    Advanced tricompartmental degenerative changes with lateral joint space narrowing and bone-on-bone articulation laterally.    Prior MRI Right Knee 6/30/23:  There is a tear of the proximal attachment of the ACL.  The PCL is intact.  Quadriceps and patellar tendons are unremarkable.  There is a tear of the proximal attachment of the MCL.  LCL is grossly intact.  There is a joint effusion.  There is bone contusion of the posterolateral tibial plateau.  There is mild DJD.  Patella, is cartilage and retinacula are intact.  There is a joint effusion.  There is soft tissue edema.  There is a chronic tear of the body and posterior horn of the medial meniscus.  There is a tear of the body and posterior horn of the lateral meniscus and the posterior horn of the lateral meniscus has flipped anteriorly on top of the anterior horn.    ASSESSMENT:      ICD-10-CM ICD-9-CM   1. Primary osteoarthritis of both knees  M17.0 715.16   2. Chronic pain of both knees  M25.561 719.46    M25.562 338.29    G89.29          PLAN:     The patient has progressive DJD of both knees.  Discussed the details of total knee arthroplasty.  She wishes to hold off on that and avoid surgery if possible.  Good response to prior corticosteroid injection.  Repeat steroid injection provided today.  Viscosupplement injections remain an option in the future.  All questions answered.  Return to clinic as needed.    Large Joint Aspiration/Injection: R knee    Date/Time: 6/24/2024 2:15 PM    Performed by: JEANINE Fuentes MD  Authorized by: JEANINE Fuentes MD    Consent Done?:  Yes (Verbal)  Indications:  Pain  Site marked: the procedure site was marked    Timeout: prior to procedure the correct patient, procedure, and site was verified    Prep: patient was prepped and draped in usual sterile fashion      Local anesthesia used?: Yes     Local anesthetic:  Co-phenylcaine spray (0.2% Naropin)  Anesthetic total (ml):  4      Details:  Needle Size:  22 G  Ultrasonic Guidance for needle placement?: No    Approach:  Lateral  Location:  Knee  Site:  R knee  Medications:  40 mg triamcinolone acetonide 40 mg/mL  Patient tolerance:  Patient tolerated the procedure well with no immediate complications  Large Joint Aspiration/Injection: L knee    Date/Time: 6/24/2024 2:15 PM    Performed by: JEANINE Fuentes MD  Authorized by: JEANINE Fuentes MD    Consent Done?:  Yes (Verbal)  Indications:  Pain  Site marked: the procedure site was marked    Timeout: prior to procedure the correct patient, procedure, and site was verified    Prep: patient was prepped and draped in usual sterile fashion      Local anesthesia used?: Yes    Local anesthetic:  Co-phenylcaine spray (0.2% Naropin)  Anesthetic total (ml):  4      Details:  Needle Size:  22 G  Ultrasonic Guidance for needle placement?: No    Approach:  Lateral  Location:  Knee  Site:  L knee  Medications:  40 mg triamcinolone acetonide 40 mg/mL  Patient tolerance:  Patient tolerated the procedure well with no immediate complications

## 2024-06-24 ENCOUNTER — OFFICE VISIT (OUTPATIENT)
Dept: SPORTS MEDICINE | Facility: CLINIC | Age: 69
End: 2024-06-24
Payer: MEDICARE

## 2024-06-24 ENCOUNTER — HOSPITAL ENCOUNTER (OUTPATIENT)
Dept: RADIOLOGY | Facility: HOSPITAL | Age: 69
Discharge: HOME | End: 2024-06-24
Attending: ORTHOPAEDIC SURGERY
Payer: MEDICARE

## 2024-06-24 VITALS
DIASTOLIC BLOOD PRESSURE: 76 MMHG | BODY MASS INDEX: 28.98 KG/M2 | WEIGHT: 174.19 LBS | SYSTOLIC BLOOD PRESSURE: 113 MMHG | HEART RATE: 69 BPM

## 2024-06-24 DIAGNOSIS — M17.0 PRIMARY OSTEOARTHRITIS OF BOTH KNEES: Primary | ICD-10-CM

## 2024-06-24 DIAGNOSIS — M25.562 CHRONIC PAIN OF BOTH KNEES: ICD-10-CM

## 2024-06-24 DIAGNOSIS — M25.561 CHRONIC PAIN OF BOTH KNEES: ICD-10-CM

## 2024-06-24 DIAGNOSIS — G89.29 CHRONIC PAIN OF BOTH KNEES: ICD-10-CM

## 2024-06-24 PROCEDURE — 1101F PT FALLS ASSESS-DOCD LE1/YR: CPT | Mod: CPTII,S$GLB,, | Performed by: ORTHOPAEDIC SURGERY

## 2024-06-24 PROCEDURE — 20610 DRAIN/INJ JOINT/BURSA W/O US: CPT | Mod: 50,S$GLB,, | Performed by: ORTHOPAEDIC SURGERY

## 2024-06-24 PROCEDURE — 99999 PR PBB SHADOW E&M-EST. PATIENT-LVL II: CPT | Mod: PBBFAC,,, | Performed by: ORTHOPAEDIC SURGERY

## 2024-06-24 PROCEDURE — 3288F FALL RISK ASSESSMENT DOCD: CPT | Mod: CPTII,S$GLB,, | Performed by: ORTHOPAEDIC SURGERY

## 2024-06-24 PROCEDURE — 3074F SYST BP LT 130 MM HG: CPT | Mod: CPTII,S$GLB,, | Performed by: ORTHOPAEDIC SURGERY

## 2024-06-24 PROCEDURE — 3008F BODY MASS INDEX DOCD: CPT | Mod: CPTII,S$GLB,, | Performed by: ORTHOPAEDIC SURGERY

## 2024-06-24 PROCEDURE — 1159F MED LIST DOCD IN RCRD: CPT | Mod: CPTII,S$GLB,, | Performed by: ORTHOPAEDIC SURGERY

## 2024-06-24 PROCEDURE — 3044F HG A1C LEVEL LT 7.0%: CPT | Mod: CPTII,S$GLB,, | Performed by: ORTHOPAEDIC SURGERY

## 2024-06-24 PROCEDURE — 99215 OFFICE O/P EST HI 40 MIN: CPT | Mod: 25,S$GLB,, | Performed by: ORTHOPAEDIC SURGERY

## 2024-06-24 PROCEDURE — 3078F DIAST BP <80 MM HG: CPT | Mod: CPTII,S$GLB,, | Performed by: ORTHOPAEDIC SURGERY

## 2024-06-24 PROCEDURE — 73564 X-RAY EXAM KNEE 4 OR MORE: CPT | Mod: TC,50

## 2024-06-24 PROCEDURE — 73564 X-RAY EXAM KNEE 4 OR MORE: CPT | Mod: 26,50,, | Performed by: RADIOLOGY

## 2024-06-24 PROCEDURE — 1126F AMNT PAIN NOTED NONE PRSNT: CPT | Mod: CPTII,S$GLB,, | Performed by: ORTHOPAEDIC SURGERY

## 2024-06-24 RX ORDER — CELECOXIB 200 MG/1
200 CAPSULE ORAL DAILY
Qty: 30 CAPSULE | Refills: 2 | Status: SHIPPED | OUTPATIENT
Start: 2024-06-24 | End: 2024-07-24

## 2024-06-24 RX ORDER — TRIAMCINOLONE ACETONIDE 40 MG/ML
40 INJECTION, SUSPENSION INTRA-ARTICULAR; INTRAMUSCULAR
Status: DISCONTINUED | OUTPATIENT
Start: 2024-06-24 | End: 2024-06-24 | Stop reason: HOSPADM

## 2024-06-24 RX ADMIN — TRIAMCINOLONE ACETONIDE 40 MG: 40 INJECTION, SUSPENSION INTRA-ARTICULAR; INTRAMUSCULAR at 02:06

## 2024-07-12 ENCOUNTER — TELEPHONE (OUTPATIENT)
Dept: INTERNAL MEDICINE | Facility: CLINIC | Age: 69
End: 2024-07-12
Payer: MEDICARE

## 2024-08-05 ENCOUNTER — OFFICE VISIT (OUTPATIENT)
Dept: INTERNAL MEDICINE | Facility: CLINIC | Age: 69
End: 2024-08-05
Payer: MEDICARE

## 2024-08-05 ENCOUNTER — LAB VISIT (OUTPATIENT)
Dept: LAB | Facility: HOSPITAL | Age: 69
End: 2024-08-05
Attending: STUDENT IN AN ORGANIZED HEALTH CARE EDUCATION/TRAINING PROGRAM
Payer: MEDICARE

## 2024-08-05 VITALS
BODY MASS INDEX: 28.61 KG/M2 | WEIGHT: 171.75 LBS | DIASTOLIC BLOOD PRESSURE: 79 MMHG | SYSTOLIC BLOOD PRESSURE: 135 MMHG | TEMPERATURE: 60 F | HEIGHT: 65 IN

## 2024-08-05 DIAGNOSIS — R79.9 ABNORMAL FINDING OF BLOOD CHEMISTRY, UNSPECIFIED: ICD-10-CM

## 2024-08-05 DIAGNOSIS — R73.03 PREDIABETES: Primary | ICD-10-CM

## 2024-08-05 DIAGNOSIS — M25.561 CHRONIC PAIN OF BOTH KNEES: ICD-10-CM

## 2024-08-05 DIAGNOSIS — M25.562 CHRONIC PAIN OF BOTH KNEES: ICD-10-CM

## 2024-08-05 DIAGNOSIS — Z00.00 HEALTHCARE MAINTENANCE: ICD-10-CM

## 2024-08-05 DIAGNOSIS — G89.29 CHRONIC PAIN OF BOTH KNEES: ICD-10-CM

## 2024-08-05 DIAGNOSIS — R73.03 PREDIABETES: ICD-10-CM

## 2024-08-05 LAB
ANION GAP SERPL CALC-SCNC: 8 MMOL/L (ref 8–16)
BUN SERPL-MCNC: 16 MG/DL (ref 8–23)
CALCIUM SERPL-MCNC: 9.9 MG/DL (ref 8.7–10.5)
CHLORIDE SERPL-SCNC: 104 MMOL/L (ref 95–110)
CO2 SERPL-SCNC: 29 MMOL/L (ref 23–29)
CREAT SERPL-MCNC: 0.8 MG/DL (ref 0.5–1.4)
EST. GFR  (NO RACE VARIABLE): >60 ML/MIN/1.73 M^2
ESTIMATED AVG GLUCOSE: 126 MG/DL (ref 68–131)
GLUCOSE SERPL-MCNC: 100 MG/DL (ref 70–110)
HBA1C MFR BLD: 6 % (ref 4–5.6)
POTASSIUM SERPL-SCNC: 4.7 MMOL/L (ref 3.5–5.1)
SODIUM SERPL-SCNC: 141 MMOL/L (ref 136–145)

## 2024-08-05 PROCEDURE — 3288F FALL RISK ASSESSMENT DOCD: CPT | Mod: HCNC,CPTII,S$GLB, | Performed by: STUDENT IN AN ORGANIZED HEALTH CARE EDUCATION/TRAINING PROGRAM

## 2024-08-05 PROCEDURE — 1160F RVW MEDS BY RX/DR IN RCRD: CPT | Mod: HCNC,CPTII,S$GLB, | Performed by: STUDENT IN AN ORGANIZED HEALTH CARE EDUCATION/TRAINING PROGRAM

## 2024-08-05 PROCEDURE — 83036 HEMOGLOBIN GLYCOSYLATED A1C: CPT | Mod: HCNC | Performed by: STUDENT IN AN ORGANIZED HEALTH CARE EDUCATION/TRAINING PROGRAM

## 2024-08-05 PROCEDURE — 3078F DIAST BP <80 MM HG: CPT | Mod: HCNC,CPTII,S$GLB, | Performed by: STUDENT IN AN ORGANIZED HEALTH CARE EDUCATION/TRAINING PROGRAM

## 2024-08-05 PROCEDURE — 3075F SYST BP GE 130 - 139MM HG: CPT | Mod: HCNC,CPTII,S$GLB, | Performed by: STUDENT IN AN ORGANIZED HEALTH CARE EDUCATION/TRAINING PROGRAM

## 2024-08-05 PROCEDURE — 1126F AMNT PAIN NOTED NONE PRSNT: CPT | Mod: HCNC,CPTII,S$GLB, | Performed by: STUDENT IN AN ORGANIZED HEALTH CARE EDUCATION/TRAINING PROGRAM

## 2024-08-05 PROCEDURE — 99214 OFFICE O/P EST MOD 30 MIN: CPT | Mod: HCNC,S$GLB,, | Performed by: STUDENT IN AN ORGANIZED HEALTH CARE EDUCATION/TRAINING PROGRAM

## 2024-08-05 PROCEDURE — 36415 COLL VENOUS BLD VENIPUNCTURE: CPT | Mod: HCNC | Performed by: STUDENT IN AN ORGANIZED HEALTH CARE EDUCATION/TRAINING PROGRAM

## 2024-08-05 PROCEDURE — 3044F HG A1C LEVEL LT 7.0%: CPT | Mod: HCNC,CPTII,S$GLB, | Performed by: STUDENT IN AN ORGANIZED HEALTH CARE EDUCATION/TRAINING PROGRAM

## 2024-08-05 PROCEDURE — 1159F MED LIST DOCD IN RCRD: CPT | Mod: HCNC,CPTII,S$GLB, | Performed by: STUDENT IN AN ORGANIZED HEALTH CARE EDUCATION/TRAINING PROGRAM

## 2024-08-05 PROCEDURE — 3008F BODY MASS INDEX DOCD: CPT | Mod: HCNC,CPTII,S$GLB, | Performed by: STUDENT IN AN ORGANIZED HEALTH CARE EDUCATION/TRAINING PROGRAM

## 2024-08-05 PROCEDURE — 80048 BASIC METABOLIC PNL TOTAL CA: CPT | Mod: HCNC | Performed by: STUDENT IN AN ORGANIZED HEALTH CARE EDUCATION/TRAINING PROGRAM

## 2024-08-05 PROCEDURE — 1101F PT FALLS ASSESS-DOCD LE1/YR: CPT | Mod: HCNC,CPTII,S$GLB, | Performed by: STUDENT IN AN ORGANIZED HEALTH CARE EDUCATION/TRAINING PROGRAM

## 2024-08-05 PROCEDURE — 99999 PR PBB SHADOW E&M-EST. PATIENT-LVL III: CPT | Mod: PBBFAC,HCNC,, | Performed by: STUDENT IN AN ORGANIZED HEALTH CARE EDUCATION/TRAINING PROGRAM

## 2024-08-05 RX ORDER — PNEUMOCOCCAL 20-VALENT CONJUGATE VACCINE 2.2; 2.2; 2.2; 2.2; 2.2; 2.2; 2.2; 2.2; 2.2; 2.2; 2.2; 2.2; 2.2; 2.2; 2.2; 2.2; 4.4; 2.2; 2.2; 2.2 UG/.5ML; UG/.5ML; UG/.5ML; UG/.5ML; UG/.5ML; UG/.5ML; UG/.5ML; UG/.5ML; UG/.5ML; UG/.5ML; UG/.5ML; UG/.5ML; UG/.5ML; UG/.5ML; UG/.5ML; UG/.5ML; UG/.5ML; UG/.5ML; UG/.5ML; UG/.5ML
0.5 INJECTION, SUSPENSION INTRAMUSCULAR ONCE
Qty: 0.5 ML | Refills: 0 | Status: SHIPPED | OUTPATIENT
Start: 2024-08-05 | End: 2024-08-05

## 2024-08-05 RX ORDER — CELECOXIB 100 MG/1
100 CAPSULE ORAL 2 TIMES DAILY PRN
Qty: 60 CAPSULE | Refills: 0 | Status: SHIPPED | OUTPATIENT
Start: 2024-08-05 | End: 2024-09-04

## 2024-08-05 RX ORDER — METFORMIN HYDROCHLORIDE 500 MG/1
500 TABLET, EXTENDED RELEASE ORAL
Qty: 90 TABLET | Refills: 3 | Status: SHIPPED | OUTPATIENT
Start: 2024-08-05 | End: 2025-08-05

## 2024-08-16 ENCOUNTER — HOSPITAL ENCOUNTER (OUTPATIENT)
Dept: RADIOLOGY | Facility: HOSPITAL | Age: 69
Discharge: HOME | End: 2024-08-16
Attending: STUDENT IN AN ORGANIZED HEALTH CARE EDUCATION/TRAINING PROGRAM
Payer: MEDICARE

## 2024-08-16 VITALS — WEIGHT: 171 LBS | HEIGHT: 65 IN | BODY MASS INDEX: 28.49 KG/M2

## 2024-08-16 DIAGNOSIS — Z00.00 HEALTHCARE MAINTENANCE: ICD-10-CM

## 2024-08-16 DIAGNOSIS — Z12.31 VISIT FOR SCREENING MAMMOGRAM: ICD-10-CM

## 2024-08-16 PROCEDURE — 77067 SCR MAMMO BI INCL CAD: CPT | Mod: 26,,, | Performed by: RADIOLOGY

## 2024-08-16 PROCEDURE — 77063 BREAST TOMOSYNTHESIS BI: CPT | Mod: 26,,, | Performed by: RADIOLOGY

## 2024-08-16 PROCEDURE — 77067 SCR MAMMO BI INCL CAD: CPT | Mod: TC

## 2024-09-02 DIAGNOSIS — G89.29 CHRONIC PAIN OF BOTH KNEES: ICD-10-CM

## 2024-09-02 DIAGNOSIS — M25.561 CHRONIC PAIN OF BOTH KNEES: ICD-10-CM

## 2024-09-02 DIAGNOSIS — M25.562 CHRONIC PAIN OF BOTH KNEES: ICD-10-CM

## 2024-09-03 RX ORDER — CELECOXIB 100 MG/1
CAPSULE ORAL
Qty: 60 CAPSULE | Refills: 0 | Status: SHIPPED | OUTPATIENT
Start: 2024-09-03

## 2024-09-16 ENCOUNTER — APPOINTMENT (OUTPATIENT)
Dept: RADIOLOGY | Facility: CLINIC | Age: 69
End: 2024-09-16
Attending: STUDENT IN AN ORGANIZED HEALTH CARE EDUCATION/TRAINING PROGRAM
Payer: MEDICARE

## 2024-09-16 ENCOUNTER — OFFICE VISIT (OUTPATIENT)
Dept: INTERNAL MEDICINE | Facility: CLINIC | Age: 69
End: 2024-09-16
Payer: MEDICARE

## 2024-09-16 VITALS
WEIGHT: 170 LBS | DIASTOLIC BLOOD PRESSURE: 80 MMHG | BODY MASS INDEX: 28.32 KG/M2 | SYSTOLIC BLOOD PRESSURE: 139 MMHG | HEART RATE: 63 BPM | HEIGHT: 65 IN

## 2024-09-16 DIAGNOSIS — Z78.0 ASYMPTOMATIC MENOPAUSAL STATE: ICD-10-CM

## 2024-09-16 DIAGNOSIS — M85.80 OSTEOPENIA, UNSPECIFIED LOCATION: ICD-10-CM

## 2024-09-16 DIAGNOSIS — K21.9 GASTROESOPHAGEAL REFLUX DISEASE WITHOUT ESOPHAGITIS: Primary | ICD-10-CM

## 2024-09-16 DIAGNOSIS — K59.01 SLOW TRANSIT CONSTIPATION: ICD-10-CM

## 2024-09-16 DIAGNOSIS — R10.11 RUQ PAIN: ICD-10-CM

## 2024-09-16 DIAGNOSIS — E78.1 HYPERTRIGLYCERIDEMIA: ICD-10-CM

## 2024-09-16 PROBLEM — R03.0 ELEVATED BLOOD PRESSURE READING: Status: ACTIVE | Noted: 2024-09-16

## 2024-09-16 PROCEDURE — 3079F DIAST BP 80-89 MM HG: CPT | Mod: HCNC,CPTII,S$GLB, | Performed by: STUDENT IN AN ORGANIZED HEALTH CARE EDUCATION/TRAINING PROGRAM

## 2024-09-16 PROCEDURE — 99999 PR PBB SHADOW E&M-EST. PATIENT-LVL III: CPT | Mod: PBBFAC,HCNC,, | Performed by: STUDENT IN AN ORGANIZED HEALTH CARE EDUCATION/TRAINING PROGRAM

## 2024-09-16 PROCEDURE — 1160F RVW MEDS BY RX/DR IN RCRD: CPT | Mod: HCNC,CPTII,S$GLB, | Performed by: STUDENT IN AN ORGANIZED HEALTH CARE EDUCATION/TRAINING PROGRAM

## 2024-09-16 PROCEDURE — 3044F HG A1C LEVEL LT 7.0%: CPT | Mod: HCNC,CPTII,S$GLB, | Performed by: STUDENT IN AN ORGANIZED HEALTH CARE EDUCATION/TRAINING PROGRAM

## 2024-09-16 PROCEDURE — 77080 DXA BONE DENSITY AXIAL: CPT | Mod: 26,HCNC,, | Performed by: INTERNAL MEDICINE

## 2024-09-16 PROCEDURE — 77080 DXA BONE DENSITY AXIAL: CPT | Mod: TC,HCNC,PO

## 2024-09-16 PROCEDURE — 1159F MED LIST DOCD IN RCRD: CPT | Mod: HCNC,CPTII,S$GLB, | Performed by: STUDENT IN AN ORGANIZED HEALTH CARE EDUCATION/TRAINING PROGRAM

## 2024-09-16 PROCEDURE — 1101F PT FALLS ASSESS-DOCD LE1/YR: CPT | Mod: HCNC,CPTII,S$GLB, | Performed by: STUDENT IN AN ORGANIZED HEALTH CARE EDUCATION/TRAINING PROGRAM

## 2024-09-16 PROCEDURE — 99213 OFFICE O/P EST LOW 20 MIN: CPT | Mod: HCNC,S$GLB,, | Performed by: STUDENT IN AN ORGANIZED HEALTH CARE EDUCATION/TRAINING PROGRAM

## 2024-09-16 PROCEDURE — 1126F AMNT PAIN NOTED NONE PRSNT: CPT | Mod: HCNC,CPTII,S$GLB, | Performed by: STUDENT IN AN ORGANIZED HEALTH CARE EDUCATION/TRAINING PROGRAM

## 2024-09-16 PROCEDURE — 3075F SYST BP GE 130 - 139MM HG: CPT | Mod: HCNC,CPTII,S$GLB, | Performed by: STUDENT IN AN ORGANIZED HEALTH CARE EDUCATION/TRAINING PROGRAM

## 2024-09-16 PROCEDURE — 3008F BODY MASS INDEX DOCD: CPT | Mod: HCNC,CPTII,S$GLB, | Performed by: STUDENT IN AN ORGANIZED HEALTH CARE EDUCATION/TRAINING PROGRAM

## 2024-09-16 PROCEDURE — 3288F FALL RISK ASSESSMENT DOCD: CPT | Mod: HCNC,CPTII,S$GLB, | Performed by: STUDENT IN AN ORGANIZED HEALTH CARE EDUCATION/TRAINING PROGRAM

## 2024-09-16 RX ORDER — ATORVASTATIN CALCIUM 20 MG/1
20 TABLET, FILM COATED ORAL DAILY
Qty: 90 TABLET | Refills: 3 | Status: SHIPPED | OUTPATIENT
Start: 2024-09-16 | End: 2025-09-16

## 2024-09-16 RX ORDER — PANTOPRAZOLE SODIUM 20 MG/1
20 TABLET, DELAYED RELEASE ORAL DAILY PRN
Qty: 30 TABLET | Refills: 0 | Status: SHIPPED | OUTPATIENT
Start: 2024-09-16 | End: 2025-09-16

## 2024-09-16 NOTE — ASSESSMENT & PLAN NOTE
Unspecific epigastric discomfort and right upper quadrant pain.    I am starting empiric treatment with PPI as needed.    Hold/discontinue NSAIDs

## 2024-09-16 NOTE — ASSESSMENT & PLAN NOTE
Chronic constipation secondary to insufficient fiber in her diet.    I have recommended for patient to increase the fiber and water intake daily.  Handout provided

## 2024-09-16 NOTE — ASSESSMENT & PLAN NOTE
Unspecific abdominal discomfort and abdominal bloating after meals.    Right upper quadrant pain, intermittent.    Ultrasound of the liver ordered

## 2024-09-16 NOTE — PROGRESS NOTES
"Subjective:       Patient ID: Celina Azevedo is a 68 y.o. female.    Chief Complaint: stomach issues (At night HA & food not digesting well)    Follow-up        Celina Azevedo is a 68 y.o. female , Ukrainian speaking, with a history of:  Menopause  OA knee  Osteopenia  S/P left knee arthroscopy  Prediabetes  HLD      [Local Patient]  Originally from [place]  Lives in: [place] [id number]       Patient comes to the clinic for abdominal pain.    She reports several weeks of abdominal distension, she says she feels "the food is not digesting", mild abdominal pain, constipation. No urinary symptoms or constitutional symptoms. Pending colonoscopy.    Pain also radiates to the right upper quadrant, especially after large meals or fried food.    She states that she has chronic constipation, she admits he does not eat a lot of fruits or fiber.    Patient states she has been very worried because she says a friend told her that her  was recently diagnosed with stomach cancer.    No other complaints or concerns      Since last seen by me:      08/05/2024: Prediabetes follow-up  621/24 Chronic pain of both knees  10/30/23 Preoperative clearance  07/13/23 Chronic right knee pain - referral to PT  5/22/23 Headache      Changes in health or medications: No    Specialists visits and recommendations:        H/o ER or Urgent care visits:   NO    H/o Hospitalizations:  NO    H/o falls: None     Life events / lifestyle:   Had esthetic blepharoplasty in [place] December 2023       Most recent laboratories reviewed:    Recent Labs   Lab 05/22/23  1611 10/30/23  0922 01/24/24  0831   WBC 12.82 H 5.89 6.58   Hemoglobin 12.2 12.3 12.4   Hematocrit 39.6 39.3 38.8   MCV 93 91 91   Platelets 224 195 232       Recent Labs   Lab 03/08/22  1043 08/03/22  1519 01/17/23  0855 05/22/23  1611 10/30/23  0922 11/01/23  0902 01/24/24  0831 08/05/24  0811   Glucose 100   < > 93   < > 98 96 93 100   Sodium 139   < > 140   < > 142 141 140 141   Potassium " "4.4   < > 4.3   < > 4.7 4.2 4.5 4.7   BUN 16   < > 20   < > 21 18 17 16   Creatinine 0.8   < > 0.8   < > 0.9 0.8 0.8 0.8   eGFR if non  >60.0  --   --   --   --   --   --   --    Total Bilirubin 0.4   < > 0.5  --  0.4  --  0.4  --    AST 20   < > 30  --  19  --  20  --    ALT 21   < > 37  --  18  --  17  --     < > = values in this interval not displayed.       Recent Labs   Lab 05/22/23  1611 01/24/24  0831 08/05/24  0811   Hemoglobin A1C 5.9 H 5.9 H 6.0 H       Recent Labs   Lab 08/03/22  1519 03/10/23  1020 01/24/24  0831   Cholesterol 146 239 H 174   Triglycerides 126 154 H 97   HDL 65 56 61   LDL Cholesterol 55.8 L 152.2 93.6   Non-HDL Cholesterol 81 183 113       Recent Labs   Lab 03/08/22  1043 12/31/22  1223   TSH 1.798 1.916       Recent Labs   Lab 03/08/22  1043 12/31/22  1223 11/01/23  0902   HIV 1/2 Ag/Ab  --  Non-reactive Non-reactive   Hepatitis C Ab Negative Non-reactive  --          Current medications:    Current Outpatient Medications:     atorvastatin (LIPITOR) 20 MG tablet, Take 1 tablet (20 mg total) by mouth once daily., Disp: 90 tablet, Rfl: 3    metFORMIN (GLUCOPHAGE-XR) 500 MG ER 24hr tablet, Take 1 tablet (500 mg total) by mouth daily with breakfast., Disp: 90 tablet, Rfl: 3    pantoprazole (PROTONIX) 20 MG tablet, Take 1 tablet (20 mg total) by mouth daily as needed (gerd)., Disp: 30 tablet, Rfl: 0    valACYclovir (VALTREX) 1000 MG tablet, Take 1 tablet (1,000 mg total) by mouth 2 (two) times daily. for 7 days, Disp: 14 tablet, Rfl: 0      Healthcare Maintenance:  Colon cancer screening:         Vaccinations:        Tetanus: NO       Pneumonia: NO       Zoster: NO       Influenza: NO       COVID vaccination: completed X 3                Depression screening: PHQ2 score = 0     Annual Wellness visit approx. Month: JANUARY    ROS  11-point review of systems done. Negative except for detailed in the HPI.  R lower lid mass      Objective:      /80   Pulse 63   Ht 5' 5" " (1.651 m)   Wt 77.1 kg (169 lb 15.6 oz)   BMI 28.29 kg/m²      Physical Exam   General appearance: Good general aspect, NAD, conversant   Eyes and HEENT: Normal sclerae, moist mucous membranes, no thyromegaly or lymphadenopathy  R lower eyelid hordeolum noted  Respiratory: No work of breathing, clear to auscultation bilaterally. No rales, rhonchi, wheezing, or rubs.  Cardiovascular: PMI not displaced. RRR. Normal S1, S2. No murmurs, rubs or gallops.  Abdomen and : Soft, non-tender, nondistended, BS, no hepatosplenomegaly, no masses. No CVA tenderness.  Extremities: no edemas, no extremity lymphadenopathy, no clubbing, no cyanosis.  BL knee edema (mild)  BLLE varicose veins  Nervous System: Alert and oriented x 3, good concentration, no deficits.  Skin: Normal temperature, turgor and texture; no rash, ulcers or subcutaneous nodules  Psych: Appropriate affect, alert and oriented to person, place and time          Assessment:       1. Gastroesophageal reflux disease without esophagitis  Assessment & Plan:  Unspecific epigastric discomfort and right upper quadrant pain.    I am starting empiric treatment with PPI as needed.    Hold/discontinue NSAIDs    Orders:  -     pantoprazole (PROTONIX) 20 MG tablet; Take 1 tablet (20 mg total) by mouth daily as needed (gerd).  Dispense: 30 tablet; Refill: 0    2. Slow transit constipation  Assessment & Plan:  Chronic constipation secondary to insufficient fiber in her diet.    I have recommended for patient to increase the fiber and water intake daily.  Handout provided      3. RUQ pain  Assessment & Plan:  Unspecific abdominal discomfort and abdominal bloating after meals.    Right upper quadrant pain, intermittent.    Ultrasound of the liver ordered    Orders:  -     US Abdomen Limited_Liver; Future; Expected date: 09/16/2024         Plan:         PPI PRN  US liver      Problem list updated  Medications reconciled  Education provided  Lifestyle recommendations given  AVS  generated, explained, and sent to the patient.  RTC in : January for AWV, labs ordered            TRICIA BUNDY MD, MPH  Internal Medicine  [facility]  [facility]

## 2024-09-19 ENCOUNTER — HOSPITAL ENCOUNTER (OUTPATIENT)
Dept: RADIOLOGY | Facility: HOSPITAL | Age: 69
Discharge: HOME | End: 2024-09-19
Attending: STUDENT IN AN ORGANIZED HEALTH CARE EDUCATION/TRAINING PROGRAM
Payer: MEDICARE

## 2024-09-19 DIAGNOSIS — R10.11 RUQ PAIN: ICD-10-CM

## 2024-09-19 PROCEDURE — 76705 ECHO EXAM OF ABDOMEN: CPT | Mod: 26,HCNC,, | Performed by: STUDENT IN AN ORGANIZED HEALTH CARE EDUCATION/TRAINING PROGRAM

## 2024-09-19 PROCEDURE — 76705 ECHO EXAM OF ABDOMEN: CPT | Mod: TC,HCNC

## 2024-10-15 ENCOUNTER — PATIENT MESSAGE (OUTPATIENT)
Dept: RESEARCH | Facility: HOSPITAL | Age: 69
End: 2024-10-15
Payer: MEDICARE

## 2024-10-15 DIAGNOSIS — K21.9 GASTROESOPHAGEAL REFLUX DISEASE WITHOUT ESOPHAGITIS: ICD-10-CM

## 2024-10-15 RX ORDER — PANTOPRAZOLE SODIUM 20 MG/1
TABLET, DELAYED RELEASE ORAL
Qty: 30 TABLET | Refills: 0 | Status: SHIPPED | OUTPATIENT
Start: 2024-10-15

## 2024-10-16 ENCOUNTER — OFFICE VISIT (OUTPATIENT)
Dept: URGENT CARE | Facility: CLINIC | Age: 69
End: 2024-10-16
Payer: MEDICARE

## 2024-10-16 VITALS
HEART RATE: 77 BPM | WEIGHT: 169 LBS | SYSTOLIC BLOOD PRESSURE: 143 MMHG | DIASTOLIC BLOOD PRESSURE: 87 MMHG | OXYGEN SATURATION: 95 % | RESPIRATION RATE: 16 BRPM | HEIGHT: 65 IN | TEMPERATURE: 99 F | BODY MASS INDEX: 28.16 KG/M2

## 2024-10-16 DIAGNOSIS — J02.0 STREPTOCOCCAL PHARYNGITIS: Primary | ICD-10-CM

## 2024-10-16 DIAGNOSIS — J02.9 SORE THROAT: ICD-10-CM

## 2024-10-16 LAB
CTP QC/QA: YES
CTP QC/QA: YES
MOLECULAR STREP A: POSITIVE
SARS-COV-2 AG RESP QL IA.RAPID: NEGATIVE

## 2024-10-16 PROCEDURE — 99213 OFFICE O/P EST LOW 20 MIN: CPT | Mod: S$GLB,,, | Performed by: SURGERY

## 2024-10-16 PROCEDURE — 87811 SARS-COV-2 COVID19 W/OPTIC: CPT | Mod: QW,S$GLB,, | Performed by: SURGERY

## 2024-10-16 PROCEDURE — 87651 STREP A DNA AMP PROBE: CPT | Mod: QW,S$GLB,, | Performed by: SURGERY

## 2024-10-16 RX ORDER — AMOXICILLIN 875 MG/1
875 TABLET, FILM COATED ORAL 2 TIMES DAILY
Qty: 20 TABLET | Refills: 0 | Status: SHIPPED | OUTPATIENT
Start: 2024-10-16 | End: 2024-10-26

## 2024-10-16 NOTE — PROGRESS NOTES
"Subjective:      Patient ID: Celina Azevedo is a 68 y.o. female.    Vitals:  height is 5' 5" (1.651 m) and weight is 76.7 kg (169 lb). Her oral temperature is 98.5 °F (36.9 °C). Her blood pressure is 143/87 (abnormal) and her pulse is 77. Her respiration is 16 and oxygen saturation is 95%.     Chief Complaint: Sore Throat    This is a 68 y.o. female who presents today with a chief complaint of sore throat with trouble swallowing headaches and runny nose that started 2 days ago. Pt stated that she noticed white spots coming out her mouth. Took tylenol       Sore Throat   This is a new problem. The current episode started in the past 7 days. The problem has been unchanged. Neither side of throat is experiencing more pain than the other. There has been no fever. The pain is at a severity of 6/10. The pain is mild. Associated symptoms include congestion, coughing, headaches and trouble swallowing. Pertinent negatives include no abdominal pain.       HENT:  Positive for congestion, sore throat and trouble swallowing.    Respiratory:  Positive for cough.    Gastrointestinal:  Negative for abdominal pain.   Neurological:  Positive for headaches.      Objective:     Physical Exam   Constitutional: She is oriented to person, place, and time. She appears well-developed. She is cooperative.  Non-toxic appearance. She does not appear ill. No distress.   HENT:   Head: Normocephalic and atraumatic.   Ears:   Right Ear: Hearing, tympanic membrane, external ear and ear canal normal.   Left Ear: Hearing, tympanic membrane, external ear and ear canal normal.   Nose: Mucosal edema and rhinorrhea present. No nasal deformity. No epistaxis. Right sinus exhibits no maxillary sinus tenderness and no frontal sinus tenderness. Left sinus exhibits no maxillary sinus tenderness and no frontal sinus tenderness.   Mouth/Throat: Uvula is midline and mucous membranes are normal. No trismus in the jaw. Normal dentition. No uvula swelling. " Posterior oropharyngeal edema and posterior oropharyngeal erythema present. No oropharyngeal exudate. Tonsils are 1+ on the right. Tonsils are 1+ on the left. No tonsillar exudate.   Eyes: Conjunctivae and lids are normal. No scleral icterus.   Neck: Trachea normal and phonation normal. Neck supple. No edema present. No erythema present. No neck rigidity present.   Cardiovascular: Normal rate, regular rhythm, normal heart sounds and normal pulses.   Pulmonary/Chest: Effort normal and breath sounds normal. No respiratory distress. She has no decreased breath sounds. She has no rhonchi.   Abdominal: Normal appearance.   Musculoskeletal: Normal range of motion.         General: No deformity. Normal range of motion.   Neurological: She is alert and oriented to person, place, and time. She exhibits normal muscle tone. Coordination normal.   Skin: Skin is warm, dry, intact, not diaphoretic and not pale.   Psychiatric: Her speech is normal and behavior is normal. Judgment and thought content normal.   Nursing note and vitals reviewed.      Assessment:     1. Streptococcal pharyngitis    2. Sore throat        Plan:       Streptococcal pharyngitis  -     amoxicillin (AMOXIL) 875 MG tablet; Take 1 tablet (875 mg total) by mouth 2 (two) times daily. for 10 days  Dispense: 20 tablet; Refill: 0    Sore throat  -     SARS Coronavirus 2 Antigen, POCT Manual Read  -     POCT Strep A, Molecular      Results for orders placed or performed in visit on 10/16/24   SARS Coronavirus 2 Antigen, POCT Manual Read    Collection Time: 10/16/24  2:52 PM   Result Value Ref Range    SARS Coronavirus 2 Antigen Negative Negative     Acceptable Yes    POCT Strep A, Molecular    Collection Time: 10/16/24  2:52 PM   Result Value Ref Range    Molecular Strep A, POC Positive (A) Negative     Acceptable Yes

## 2024-11-12 DIAGNOSIS — K21.9 GASTROESOPHAGEAL REFLUX DISEASE WITHOUT ESOPHAGITIS: ICD-10-CM

## 2024-11-12 RX ORDER — PANTOPRAZOLE SODIUM 20 MG/1
TABLET, DELAYED RELEASE ORAL
Qty: 30 TABLET | Refills: 0 | Status: SHIPPED | OUTPATIENT
Start: 2024-11-12

## 2024-12-26 ENCOUNTER — TELEPHONE (OUTPATIENT)
Dept: ENDOSCOPY | Facility: HOSPITAL | Age: 69
End: 2024-12-26
Payer: MEDICARE

## 2024-12-26 NOTE — TELEPHONE ENCOUNTER
Pt friend called to canceled procedure Pt friend is not listed in her chart so I informed pt friend I'm not allowed to cancel procedure with out pt permission

## 2025-01-31 ENCOUNTER — TELEPHONE (OUTPATIENT)
Dept: ENDOSCOPY | Facility: HOSPITAL | Age: 70
End: 2025-01-31
Payer: MEDICARE

## 2025-01-31 DIAGNOSIS — Z00.00 HEALTHCARE MAINTENANCE: Primary | ICD-10-CM

## 2025-01-31 NOTE — TELEPHONE ENCOUNTER
Contacted Pt to schedule a pre admit testing (PAT) appointment. PAT appointment scheduled for 04/02/2025 at 08:40AM, Pt verbalized understanding.

## 2025-02-24 DIAGNOSIS — Z00.00 ENCOUNTER FOR MEDICARE ANNUAL WELLNESS EXAM: ICD-10-CM

## 2025-03-11 ENCOUNTER — OFFICE VISIT (OUTPATIENT)
Dept: PRIMARY CARE CLINIC | Facility: CLINIC | Age: 70
End: 2025-03-11
Payer: MEDICARE

## 2025-03-11 VITALS
HEART RATE: 72 BPM | BODY MASS INDEX: 28.05 KG/M2 | OXYGEN SATURATION: 99 % | DIASTOLIC BLOOD PRESSURE: 82 MMHG | WEIGHT: 168.56 LBS | SYSTOLIC BLOOD PRESSURE: 130 MMHG

## 2025-03-11 DIAGNOSIS — L72.9 CUTANEOUS CYST: ICD-10-CM

## 2025-03-11 DIAGNOSIS — E78.2 MIXED HYPERLIPIDEMIA: ICD-10-CM

## 2025-03-11 DIAGNOSIS — Z76.89 ENCOUNTER TO ESTABLISH CARE: Primary | ICD-10-CM

## 2025-03-11 DIAGNOSIS — R73.03 PREDIABETES: ICD-10-CM

## 2025-03-11 PROCEDURE — 99215 OFFICE O/P EST HI 40 MIN: CPT | Mod: HCNC,S$GLB,,

## 2025-03-11 PROCEDURE — 1159F MED LIST DOCD IN RCRD: CPT | Mod: HCNC,CPTII,S$GLB,

## 2025-03-11 PROCEDURE — 1126F AMNT PAIN NOTED NONE PRSNT: CPT | Mod: HCNC,CPTII,S$GLB,

## 2025-03-11 PROCEDURE — 3288F FALL RISK ASSESSMENT DOCD: CPT | Mod: HCNC,CPTII,S$GLB,

## 2025-03-11 PROCEDURE — 1160F RVW MEDS BY RX/DR IN RCRD: CPT | Mod: HCNC,CPTII,S$GLB,

## 2025-03-11 PROCEDURE — 1101F PT FALLS ASSESS-DOCD LE1/YR: CPT | Mod: HCNC,CPTII,S$GLB,

## 2025-03-11 PROCEDURE — 3079F DIAST BP 80-89 MM HG: CPT | Mod: HCNC,CPTII,S$GLB,

## 2025-03-11 PROCEDURE — 99417 PROLNG OP E/M EACH 15 MIN: CPT | Mod: HCNC,S$GLB,,

## 2025-03-11 PROCEDURE — 3075F SYST BP GE 130 - 139MM HG: CPT | Mod: HCNC,CPTII,S$GLB,

## 2025-03-11 PROCEDURE — 99999 PR PBB SHADOW E&M-EST. PATIENT-LVL IV: CPT | Mod: PBBFAC,HCNC,,

## 2025-03-11 PROCEDURE — 3008F BODY MASS INDEX DOCD: CPT | Mod: HCNC,CPTII,S$GLB,

## 2025-03-11 NOTE — PATIENT INSTRUCTIONS
Consulte las instrucciones adjuntas para la higiene del sueño y el manejo del reflujo    Seguimiento clínico en 1 mes:  - Los análisis de laboratorio se programarán alonso semana antes de la visita a la clínica

## 2025-03-11 NOTE — PROGRESS NOTES
[facility]      Subjective     Patient Name: Celina Azevedo  YOB: 1955  Patient Age: 69 y.o.  Patient Sex: female  Patient Phone: [phone]  PCP: Krishna Middleton MD  Last PCP Appointment: 3/11/2025    History of Present Illness    CHIEF COMPLAINT:  Ms. Azevedo is from [place] and speaks [place]. She presents today with her friend  to establish care with chief concern about blood sugar.    PRE-DIABETES:  Her A1C values have remained stable between 5.9-6.0 over the past three years, consistently in pre-diabetic range. She was previously prescribed Metformin for diabetes prevention but declined to initiate therapy.    CURRENT SYMPTOMS:  She experiences nocturnal headaches when lying down but feels well during daytime hours. She reports digestive issues with certain foods and some constipation, but denies diarrhea and nausea. Her last meal is typically at 6 PM with bedtime at 12 AM.    SURGICAL HISTORY:  She underwent surgery for circulation problems approximately 40 years ago and has had meniscus surgery.    MEDICAL HISTORY:  She reports being very healthy during childhood in [place].    MEDICATIONS:  She has Pepcid and Tylenol available but does not take either medication regularly.    ROS:  ROS is negative unless otherwise indicated in HPI.         Health Maintenance Summary            Current Care Gaps       Shingles Vaccine (2 of 2) Overdue since 8/27/2022 07/02/2022  Imm Admin: Zoster Recombinant              Influenza Vaccine (1) Overdue since 9/1/2024 03/08/2022  Imm Admin: Influenza (FLUAD) - Quadrivalent - Adjuvanted - PF *Preferred* (65+)              COVID-19 Vaccine (4 - 2024-25 season) Overdue since 9/1/2024 01/04/2022  Imm Admin: COVID-19, MRNA, LN-S, PF (Pfizer) (Purple Cap)    03/11/2021  Imm Admin: COVID-19, MRNA, LN-S, PF (Pfizer) (Purple Cap)    02/18/2021  Imm Admin: COVID-19, MRNA, LN-S, PF (Pfizer) (Purple Cap)              TETANUS VACCINE (Every 10 Years)  Never done     No completion history exists for this topic.              Pneumococcal Vaccines (Age 50+) (1 of 2 - PCV) Never done     No completion history exists for this topic.              RSV Vaccine (Age 60+ and Pregnant patients) (1 - Risk 60-74 years 1-dose series) Never done     No completion history exists for this topic.                      Awaiting Completion       Colorectal Cancer Screening (View Topic Details) Scheduled for 4/1/2025 01/31/2025  Order placed for Ambulatory referral/consult to Endo Procedure  by Rey Patrick MA              Lipid Panel (Every 5 Years) Scheduled for 4/7/2025 03/11/2025  Order placed for Lipid Panel by Krishna Middleton MD    01/24/2024  Cholesterol Total component of Lipid Panel    03/10/2023  Cholesterol Total component of Lipid Panel    08/03/2022  Cholesterol Total component of Lipid Panel    03/08/2022  Cholesterol Total component of Lipid Panel     Only the first 5 history entries have been loaded, but more history exists.                    Upcoming       Hemoglobin A1c (Prediabetes) (Yearly) Next due on 8/5/2025 08/05/2024  Hemoglobin A1C External component of HEMOGLOBIN A1C    01/24/2024  Hemoglobin A1C External component of Hemoglobin A1C    05/22/2023  Hemoglobin A1C External component of Hemoglobin A1C    01/17/2023  Hemoglobin A1C External component of Hemoglobin A1C    03/08/2022  Hemoglobin A1C External component of Hemoglobin A1C      Only the first 5 history entries have been loaded, but more history exists.              Mammogram (Yearly) Next due on 8/16/2025 08/16/2024  Mammo Digital Screening Bilat w/ Yves    09/12/2022  Mammo Digital Screening Bilat w/ Yves              DEXA Scan (Every 5 Years) Next due on 9/16/2029 09/16/2024  DXA Bone Density Axial Skeleton 1 or more sites    09/12/2022  DXA Bone Density Spine And Hip                      Completed or No Longer Recommended       Hepatitis C Screening  Completed       12/31/2022  Hepatitis C Ab component of Hepatitis C Antibody    03/08/2022  Hepatitis C Ab component of Hepatitis C Antibody                            4Ms for Medical Decision-Making in Older Adults    Last Completed EAWV:  None    MEDICATIONS:  High Risk Medications:  Total Active Medications: 0  This patient does not have an active medication from one of the medication groupers.    MOBILITY:  Activities of Daily Living:       No data to display              Fall Risk:      3/11/2025     1:40 PM 9/16/2024     9:00 AM 8/5/2024    10:00 AM   Fall Risk Assessment - Outpatient   Mobility Status Ambulatory Ambulatory Ambulatory   Number of falls 1 0 1   Identified as fall risk False False False     Disability Status:       No data to display              Nutrition Screening:       No data to display             Screening Score: 0-7 Malnourished, 8-11 At Risk, 12-14 Normal  Get Up and Go:       No data to display              Whisper Test:       No data to display                    MENTATION:   Has Dementia Dx: No  Has Anxiety Dx: No    Depression Patient Health Questionnaire:      3/11/2025     2:01 PM   Depression Patient Health Questionnaire   Over the last two weeks how often have you been bothered by little interest or pleasure in doing things Not at all   Over the last two weeks how often have you been bothered by feeling down, depressed or hopeless Not at all   PHQ-2 Total Score 0     Cognitive Function Screening:       No data to display              Cognitive Function Screening Total - Less than 4 = Abnormal,  Greater than or equal to 4 = Normal        WHAT MATTERS MOST:  Advance Care Planning   ACP Status:   Patient does not have an ACP conversation on file  Living Will: No  Power of : No  LaPOST: No    What is most important right now is to focus on avoiding the hospital, remaining as independent as possible, and symptom/pain control    Accordingly, we have decided that the best plan to meet the  patient's goals includes continuing with treatment      What matters most to patient today is: establishing care                 Social History[1]    Past Medical History:   Diagnosis Date    Headache 05/22/2023    Hyperlipidemia        Prior to Admission medications    Medication Sig Start Date End Date Taking? Authorizing Provider   atorvastatin (LIPITOR) 20 MG tablet Take 1 tablet (20 mg total) by mouth once daily. 9/16/24 9/16/25 Yes Michelle Alarcon MD   valACYclovir (VALTREX) 1000 MG tablet Take 1 tablet (1,000 mg total) by mouth 2 (two) times daily. for 7 days 3/11/24 3/18/24  Rowena Toscano NP   metFORMIN (GLUCOPHAGE-XR) 500 MG ER 24hr tablet Take 1 tablet (500 mg total) by mouth daily with breakfast.  Patient not taking: Reported on 3/11/2025 8/5/24 3/11/25  Michelle Alarcon MD   pantoprazole (PROTONIX) 20 MG tablet TAKE 1 TABLET(20 MG) BY MOUTH DAILY AS NEEDED FOR GERD  Patient not taking: Reported on 3/11/2025 11/12/24 3/11/25  Michelle Alarcon MD       OBJECTIVE:     Vitals:    03/11/25 1400   BP: 130/82   BP Location: Left arm   Patient Position: Sitting   Pulse: 72   SpO2: 99%   Weight: 76.5 kg (168 lb 8.7 oz)       Body mass index is 28.05 kg/m².     PHYSICAL EXAM  GEN - A+OX4, NAD   HEENT - PERRL, EOMI,   Neck - No thyromegaly or cervical LAD.   CV - RRR, no m/r   Chest - CTAB, no wheezing or rhonchi  Abd - S/NT/ND/+BS.   Ext - 2+BDP and radial pulses. Mild BLE edema.  MSK - normal ROM, no tenderness  Neuro - 5/5 BUE and BLE strength.  LN - No axillary or inguinal LAD appreciated.  Skin - suspected subcutaneous cyst on left cheek     LABS  Lab Results   Component Value Date    WBC 6.58 01/24/2024    HGB 12.4 01/24/2024    HCT 38.8 01/24/2024    MCV 91 01/24/2024     01/24/2024         CMP  Sodium   Date Value Ref Range Status   08/05/2024 141 136 - 145 mmol/L Final     Potassium   Date Value Ref Range Status   08/05/2024 4.7 3.5 - 5.1 mmol/L Final     Chloride   Date Value Ref Range  Status   08/05/2024 104 95 - 110 mmol/L Final     CO2   Date Value Ref Range Status   08/05/2024 29 23 - 29 mmol/L Final     Glucose   Date Value Ref Range Status   08/05/2024 100 70 - 110 mg/dL Final     BUN   Date Value Ref Range Status   08/05/2024 16 8 - 23 mg/dL Final     Creatinine   Date Value Ref Range Status   08/05/2024 0.8 0.5 - 1.4 mg/dL Final     Calcium   Date Value Ref Range Status   08/05/2024 9.9 8.7 - 10.5 mg/dL Final     Total Protein   Date Value Ref Range Status   01/24/2024 7.0 6.0 - 8.4 g/dL Final     Albumin   Date Value Ref Range Status   01/24/2024 3.6 3.5 - 5.2 g/dL Final     Total Bilirubin   Date Value Ref Range Status   01/24/2024 0.4 0.1 - 1.0 mg/dL Final     Comment:     For infants and newborns, interpretation of results should be based  on gestational age, weight and in agreement with clinical  observations.    Premature Infant recommended reference ranges:  Up to 24 hours.............<8.0 mg/dL  Up to 48 hours............<12.0 mg/dL  3-5 days..................<15.0 mg/dL  6-29 days.................<15.0 mg/dL       Alkaline Phosphatase   Date Value Ref Range Status   01/24/2024 67 55 - 135 U/L Final     AST   Date Value Ref Range Status   01/24/2024 20 10 - 40 U/L Final     ALT   Date Value Ref Range Status   01/24/2024 17 10 - 44 U/L Final     Anion Gap   Date Value Ref Range Status   08/05/2024 8 8 - 16 mmol/L Final     eGFR   Date Value Ref Range Status   08/05/2024 >60.0 >60 mL/min/1.73 m^2 Final       ASSESSMENT & PLAN:   Ms. Celina Azevedo is a 69 y.o. female who was seen in clinic today to establish care. Pt is in need of annual visit. A1C stable in pre-diabetic range (5.9-6.0) over past 3 years. Considered sleep apnea as potential cause of nighttime headaches; pt not interested in CPAP use so sleep study not ordered. Determined OTC treatments sufficient for managing symptoms of headache and reflux at this time. Will order future fasting pre-labs and schedule one month  follow up.        1. Encounter to establish care    2. Prediabetes  Overview:  - Prediabetes managed with diet and exercise  - Previously prescribed metformin but never started    Assessment & Plan:  Lab Results   Component Value Date    HGBA1C 6.0 (H) 08/05/2024    HGBA1C 5.9 (H) 01/24/2024    HGBA1C 5.9 (H) 05/22/2023    HGBA1C 5.9 (H) 01/17/2023    HGBA1C 5.9 (H) 03/08/2022   - Glucose and A1C controlled on current therapies  - Continue current regimen  - Low sugar/carb diet encouraged  - Aerobic exercise as tolerated, goal 150min/week  - Regular home blood glucose checks  - Complete annual eye & foot examinations     Orders:  -     Hemoglobin A1C; Future; Expected date: 03/11/2025    3. Mixed hyperlipidemia  Overview:  - HLD managed with atorvastatin 20mg qd    Assessment & Plan:  Lab Results   Component Value Date    CHOL 174 01/24/2024    HDL 61 01/24/2024    LDLCALC 93.6 01/24/2024    TRIG 97 01/24/2024     The 10-year ASCVD risk score (Gigi VILLAFUERTE, et al., 2019) is: 10.3%    Values used to calculate the score:      Age: 69 years      Sex: Female      Is Non- : No      Diabetic: No      Tobacco smoker: No      Systolic Blood Pressure: 130 mmHg      Is BP treated: No      HDL Cholesterol: 61 mg/dL      Total Cholesterol: 174 mg/dL  - Cholesterol/triglycerides well managed  - Continue statin therapy and low cholesterol diet  - Aerobic exercise as tolerated, goal 150min/week  - Trend future lipid panel.    Orders:  -     Comprehensive Metabolic Panel; Future; Expected date: 03/11/2025  -     Lipid Panel; Future; Expected date: 03/11/2025    4. Body mass index (BMI) 28.0-28.9, adult  -     CBC Auto Differential; Future; Expected date: 03/11/2025    5. Cutaneous cyst  Overview:  - Small 3mm cyst on left cheek  - Dark head visible     Assessment & Plan:  - Suspect cutaneous/epidermal cyst  - Repeat applications of warm compresses for 15-30minutes  - Clean skin after expulsion  - Home  management instructions provided       Follow up in about 4 weeks (around 4/8/2025).     All questions answered. Pt to call/message the Primary Clinic for any additional concerns    I spent a total of 71 minutes on the day of the visit.      This includes face to face time and non-face to face time preparing to see the patient (eg, review of tests), obtaining and/or reviewing separately obtained history, documenting clinical information in the electronic or other health record, independently interpreting results and communicating results to the patient/family/caregiver, or care coordinator.       Krishna Middleton MD  [facility]    This note was generated with the assistance of ambient listening technology. Verbal consent was obtained by the patient and accompanying visitor(s) for the recording of patient appointment to facilitate this note. I attest to having reviewed and edited the generated note for accuracy, though some syntax or spelling errors may persist. Please contact the author of this note for any clarification.         [1]   Social History  Socioeconomic History    Marital status:    Tobacco Use    Smoking status: Never    Smokeless tobacco: Never   Substance and Sexual Activity    Alcohol use: No    Drug use: No     Social Drivers of Health     Financial Resource Strain: Low Risk  (1/13/2023)    Overall Financial Resource Strain (CARDIA)     Difficulty of Paying Living Expenses: Not hard at all   Food Insecurity: No Food Insecurity (1/13/2023)    Hunger Vital Sign     Worried About Running Out of Food in the Last Year: Never true     Ran Out of Food in the Last Year: Never true   Transportation Needs: No Transportation Needs (1/13/2023)    PRAPARE - Transportation     Lack of Transportation (Medical): No     Lack of Transportation (Non-Medical): No   Physical Activity: Inactive (1/13/2023)    Exercise Vital Sign     Days of Exercise per Week: 0 days     Minutes of  Exercise per Session: 0 min   Stress: No Stress Concern Present (1/13/2023)    [employer] - Occupational Stress Questionnaire     Feeling of Stress : Not at all   Housing Stability: Low Risk  (1/13/2023)    Housing Stability Vital Sign     Unable to Pay for Housing in the Last Year: No     Number of Places Lived in the Last Year: 1     Unstable Housing in the Last Year: No      stated

## 2025-03-12 NOTE — ASSESSMENT & PLAN NOTE
- Suspect cutaneous/epidermal cyst  - Repeat applications of warm compresses for 15-30minutes  - Clean skin after expulsion  - Home management instructions provided

## 2025-03-12 NOTE — ASSESSMENT & PLAN NOTE
Lab Results   Component Value Date    HGBA1C 6.0 (H) 08/05/2024    HGBA1C 5.9 (H) 01/24/2024    HGBA1C 5.9 (H) 05/22/2023    HGBA1C 5.9 (H) 01/17/2023    HGBA1C 5.9 (H) 03/08/2022   - Glucose and A1C controlled on current therapies  - Continue current regimen  - Low sugar/carb diet encouraged  - Aerobic exercise as tolerated, goal 150min/week  - Regular home blood glucose checks  - Complete annual eye & foot examinations

## 2025-03-12 NOTE — ASSESSMENT & PLAN NOTE
Lab Results   Component Value Date    CHOL 174 01/24/2024    HDL 61 01/24/2024    LDLCALC 93.6 01/24/2024    TRIG 97 01/24/2024     The 10-year ASCVD risk score (Gigi VILLAFUERTE, et al., 2019) is: 10.3%    Values used to calculate the score:      Age: 69 years      Sex: Female      Is Non- : No      Diabetic: No      Tobacco smoker: No      Systolic Blood Pressure: 130 mmHg      Is BP treated: No      HDL Cholesterol: 61 mg/dL      Total Cholesterol: 174 mg/dL  - Cholesterol/triglycerides well managed  - Continue statin therapy and low cholesterol diet  - Aerobic exercise as tolerated, goal 150min/week  - Trend future lipid panel.

## 2025-03-17 ENCOUNTER — TELEPHONE (OUTPATIENT)
Dept: ENDOSCOPY | Facility: HOSPITAL | Age: 70
End: 2025-03-17
Payer: MEDICARE

## 2025-03-17 NOTE — TELEPHONE ENCOUNTER
Attempted to contact patient with assistance of  ID # [id number] to schedule colonoscopy. The patient did not answer the call. Left voice message requesting a call back at [phone] to get procedure scheduled.

## 2025-04-01 ENCOUNTER — TELEPHONE (OUTPATIENT)
Dept: ENDOSCOPY | Facility: HOSPITAL | Age: 70
End: 2025-04-01

## 2025-04-01 NOTE — TELEPHONE ENCOUNTER
Contacted the patient with  # [phone] to schedule an endoscopy procedure(s) Colonoscopy . The patient did not answer the call and left a voice message requesting a call back.

## 2025-04-07 ENCOUNTER — LAB VISIT (OUTPATIENT)
Dept: LAB | Facility: HOSPITAL | Age: 70
End: 2025-04-07
Payer: MEDICARE

## 2025-04-07 DIAGNOSIS — E78.2 MIXED HYPERLIPIDEMIA: ICD-10-CM

## 2025-04-07 DIAGNOSIS — R73.03 PREDIABETES: ICD-10-CM

## 2025-04-07 LAB
ABSOLUTE EOSINOPHIL (OHS): 0.24 K/UL
ABSOLUTE MONOCYTE (OHS): 0.9 K/UL (ref 0.3–1)
ABSOLUTE NEUTROPHIL COUNT (OHS): 4.98 K/UL (ref 1.8–7.7)
ALBUMIN SERPL BCP-MCNC: 3.4 G/DL (ref 3.5–5.2)
ALP SERPL-CCNC: 70 UNIT/L (ref 40–150)
ALT SERPL W/O P-5'-P-CCNC: 17 UNIT/L (ref 10–44)
ANION GAP (OHS): 8 MMOL/L (ref 8–16)
AST SERPL-CCNC: 20 UNIT/L (ref 11–45)
BASOPHILS # BLD AUTO: 0.03 K/UL
BASOPHILS NFR BLD AUTO: 0.4 %
BILIRUB SERPL-MCNC: 0.3 MG/DL (ref 0.1–1)
BUN SERPL-MCNC: 13 MG/DL (ref 8–23)
CALCIUM SERPL-MCNC: 9.5 MG/DL (ref 8.7–10.5)
CHLORIDE SERPL-SCNC: 106 MMOL/L (ref 95–110)
CHOLEST SERPL-MCNC: 147 MG/DL (ref 120–199)
CHOLEST/HDLC SERPL: 2.7 {RATIO} (ref 2–5)
CO2 SERPL-SCNC: 27 MMOL/L (ref 23–29)
CREAT SERPL-MCNC: 0.8 MG/DL (ref 0.5–1.4)
EAG (OHS): 123 MG/DL (ref 68–131)
ERYTHROCYTE [DISTWIDTH] IN BLOOD BY AUTOMATED COUNT: 14 % (ref 11.5–14.5)
GFR SERPLBLD CREATININE-BSD FMLA CKD-EPI: >60 ML/MIN/1.73/M2
GLUCOSE SERPL-MCNC: 96 MG/DL (ref 70–110)
HBA1C MFR BLD: 5.9 % (ref 4–5.6)
HCT VFR BLD AUTO: 40.1 % (ref 37–48.5)
HDLC SERPL-MCNC: 54 MG/DL (ref 40–75)
HDLC SERPL: 36.7 % (ref 20–50)
HGB BLD-MCNC: 12.3 GM/DL (ref 12–16)
IMM GRANULOCYTES # BLD AUTO: 0.07 K/UL (ref 0–0.04)
IMM GRANULOCYTES NFR BLD AUTO: 0.9 % (ref 0–0.5)
LDLC SERPL CALC-MCNC: 77.4 MG/DL (ref 63–159)
LYMPHOCYTES # BLD AUTO: 1.96 K/UL (ref 1–4.8)
MCH RBC QN AUTO: 28.5 PG (ref 27–31)
MCHC RBC AUTO-ENTMCNC: 30.7 G/DL (ref 32–36)
MCV RBC AUTO: 93 FL (ref 82–98)
NONHDLC SERPL-MCNC: 93 MG/DL
NUCLEATED RBC (/100WBC) (OHS): 0 /100 WBC
PLATELET # BLD AUTO: 224 K/UL (ref 150–450)
PMV BLD AUTO: 11.8 FL (ref 9.2–12.9)
POTASSIUM SERPL-SCNC: 4.5 MMOL/L (ref 3.5–5.1)
PROT SERPL-MCNC: 7.2 GM/DL (ref 6–8.4)
RBC # BLD AUTO: 4.32 M/UL (ref 4–5.4)
RELATIVE EOSINOPHIL (OHS): 2.9 %
RELATIVE LYMPHOCYTE (OHS): 24 % (ref 18–48)
RELATIVE MONOCYTE (OHS): 11 % (ref 4–15)
RELATIVE NEUTROPHIL (OHS): 60.8 % (ref 38–73)
SODIUM SERPL-SCNC: 141 MMOL/L (ref 136–145)
TRIGL SERPL-MCNC: 78 MG/DL (ref 30–150)
WBC # BLD AUTO: 8.18 K/UL (ref 3.9–12.7)

## 2025-04-07 PROCEDURE — 80053 COMPREHEN METABOLIC PANEL: CPT | Mod: HCNC

## 2025-04-07 PROCEDURE — 85025 COMPLETE CBC W/AUTO DIFF WBC: CPT | Mod: HCNC

## 2025-04-07 PROCEDURE — 82465 ASSAY BLD/SERUM CHOLESTEROL: CPT | Mod: HCNC

## 2025-04-07 PROCEDURE — 83036 HEMOGLOBIN GLYCOSYLATED A1C: CPT | Mod: HCNC

## 2025-04-08 ENCOUNTER — RESULTS FOLLOW-UP (OUTPATIENT)
Dept: PRIMARY CARE CLINIC | Facility: CLINIC | Age: 70
End: 2025-04-08
Payer: MEDICARE

## 2025-04-10 ENCOUNTER — OFFICE VISIT (OUTPATIENT)
Dept: URGENT CARE | Facility: CLINIC | Age: 70
End: 2025-04-10
Payer: MEDICARE

## 2025-04-10 VITALS
RESPIRATION RATE: 16 BRPM | WEIGHT: 168 LBS | HEART RATE: 68 BPM | TEMPERATURE: 98 F | SYSTOLIC BLOOD PRESSURE: 118 MMHG | BODY MASS INDEX: 27.99 KG/M2 | DIASTOLIC BLOOD PRESSURE: 72 MMHG | HEIGHT: 65 IN | OXYGEN SATURATION: 96 %

## 2025-04-10 DIAGNOSIS — R06.2 WHEEZING: ICD-10-CM

## 2025-04-10 DIAGNOSIS — R05.9 COUGH, UNSPECIFIED TYPE: ICD-10-CM

## 2025-04-10 DIAGNOSIS — J18.9 PNEUMONIA OF LEFT LOWER LOBE DUE TO INFECTIOUS ORGANISM: Primary | ICD-10-CM

## 2025-04-10 LAB
CTP QC/QA: YES
SARS CORONAVIRUS 2 ANTIGEN: NEGATIVE

## 2025-04-10 PROCEDURE — 71046 X-RAY EXAM CHEST 2 VIEWS: CPT | Mod: FY,S$GLB,, | Performed by: RADIOLOGY

## 2025-04-10 RX ORDER — AZITHROMYCIN 250 MG/1
TABLET, FILM COATED ORAL
Qty: 6 TABLET | Refills: 0 | Status: SHIPPED | OUTPATIENT
Start: 2025-04-10 | End: 2025-04-15

## 2025-04-10 RX ORDER — AMOXICILLIN AND CLAVULANATE POTASSIUM 875; 125 MG/1; MG/1
1 TABLET, FILM COATED ORAL EVERY 12 HOURS
Qty: 14 TABLET | Refills: 0 | Status: SHIPPED | OUTPATIENT
Start: 2025-04-10 | End: 2025-04-17

## 2025-04-10 RX ORDER — ALBUTEROL SULFATE 90 UG/1
2 INHALANT RESPIRATORY (INHALATION) EVERY 6 HOURS PRN
Qty: 18 G | Refills: 0 | Status: SHIPPED | OUTPATIENT
Start: 2025-04-10

## 2025-04-10 NOTE — PATIENT INSTRUCTIONS
Augmentin- twice a day for 7 days  Azithromycin- take as directed on packaging  Albuterol inhaler- 1-2 puffs every 6 hours as needed for wheezing    OTC Mucinex for cough    Take all of the antibiotic as prescribed. Use inhaler as needed for wheezing. Use cough suppressant as needed especially at night time if you are having trouble sleeping. If you should develop increased shortness of breath, chest pain or increased weakness please return or go the ER for evaluation. Repeat chest xrays can be performed to see if there is resolution of the infection however this can take 8-12 weeks to have complete resolution. Please follow up with PCP on 4/14 as scheduled.

## 2025-04-10 NOTE — PROGRESS NOTES
"Subjective:      Patient ID: Celina Azevedo is a 69 y.o. female.    Vitals:  height is 5' 5" (1.651 m) and weight is 76.2 kg (168 lb). Her oral temperature is 98.2 °F (36.8 °C). Her blood pressure is 118/72 and her pulse is 68. Her respiration is 16 and oxygen saturation is 96%.     Chief Complaint: Cough    This is a 69 y.o. female who presents today with a chief complaint of sore throat cough that started 3 days ago.  Pt did not take any OTC meds       Provider note begins here:   Patient is a 69-year-old here with complaints of sore throat cough x3 days.  She is here with her friend who is translating.  Denies fever.  Has not taken anything for her cough.  States the cough is productive and her chest makes a "weird sound" at nighttime when coughing.    Cough  This is a new problem. The current episode started in the past 7 days. The problem has been unchanged. The problem occurs constantly. The cough is Productive of sputum. Associated symptoms include nasal congestion, a sore throat and wheezing. Pertinent negatives include no ear congestion, ear pain or fever.     Constitution: Negative for fatigue and fever.   HENT:  Positive for sore throat. Negative for ear pain.    Respiratory:  Positive for cough and wheezing.       Objective:     Physical Exam   Constitutional: She is oriented to person, place, and time. She appears well-developed. She is cooperative.  Non-toxic appearance. She does not appear ill. No distress.   HENT:   Head: Normocephalic and atraumatic.   Ears:   Right Ear: Hearing, external ear and ear canal normal. A middle ear effusion is present.   Left Ear: Hearing, external ear and ear canal normal. A middle ear effusion is present.   Nose: Congestion present. No mucosal edema, rhinorrhea or nasal deformity. No epistaxis. Right sinus exhibits no maxillary sinus tenderness and no frontal sinus tenderness. Left sinus exhibits no maxillary sinus tenderness and no frontal sinus tenderness. "   Mouth/Throat: Uvula is midline, oropharynx is clear and moist and mucous membranes are normal. No trismus in the jaw. Normal dentition. No uvula swelling. No oropharyngeal exudate, posterior oropharyngeal edema, posterior oropharyngeal erythema, tonsillar abscesses or cobblestoning.   Eyes: Conjunctivae and lids are normal. No scleral icterus.   Neck: Trachea normal and phonation normal. Neck supple. No edema present. No erythema present. No neck rigidity present.   Cardiovascular: Normal rate, regular rhythm, normal heart sounds and normal pulses.   Pulmonary/Chest: Effort normal. No stridor. No respiratory distress. She has no decreased breath sounds. She has no wheezes. She has rhonchi in the left middle field and the left lower field. She has no rales.   Abdominal: Normal appearance.   Musculoskeletal: Normal range of motion.         General: No deformity. Normal range of motion.   Lymphadenopathy:     She has no cervical adenopathy.   Neurological: She is alert and oriented to person, place, and time. She exhibits normal muscle tone. Coordination normal.   Skin: Skin is warm, dry, intact, not diaphoretic and not pale.   Psychiatric: Her speech is normal and behavior is normal. Judgment and thought content normal.   Nursing note and vitals reviewed.      Assessment:     1. Pneumonia of left lower lobe due to infectious organism    2. Cough, unspecified type    3. Wheezing      Results for orders placed or performed in visit on 04/10/25   SARS Coronavirus 2 Antigen, POCT Manual Read    Collection Time: 04/10/25  2:43 PM   Result Value Ref Range    SARS Coronavirus 2 Antigen Negative Negative, Presumptive Negative     Acceptable Yes        Plan:       Pneumonia of left lower lobe due to infectious organism  -     amoxicillin-clavulanate 875-125mg (AUGMENTIN) 875-125 mg per tablet; Take 1 tablet by mouth every 12 (twelve) hours. for 7 days  Dispense: 14 tablet; Refill: 0  -     azithromycin (Z-ARIE)  250 MG tablet; Take 2 tablets by mouth on day 1; Take 1 tablet by mouth on days 2-5  Dispense: 6 tablet; Refill: 0    Cough, unspecified type  -     SARS Coronavirus 2 Antigen, POCT Manual Read  -     XR CHEST PA AND LATERAL; Future; Expected date: 04/10/2025    Wheezing  -     albuterol (VENTOLIN HFA) 90 mcg/actuation inhaler; Inhale 2 puffs into the lungs every 6 (six) hours as needed for Wheezing. Rescue  Dispense: 18 g; Refill: 0      COVID test negative   Chest x-ray with prominent interstitial markings  Will treat for pneumonia and have pt follow up with PCP on 4/14 as scheduled          XR CHEST PA AND LATERAL  Result Date: 4/10/2025  EXAMINATION: XR CHEST PA AND LATERAL CLINICAL HISTORY: Cough, unspecified TECHNIQUE: PA and lateral views of the chest were performed. COMPARISON: 11/01/2023. FINDINGS: The trachea is unremarkable.  There are calcifications of the aortic knob.  The cardiomediastinal silhouette is within normal limits.  There are no pleural effusions.  There is no evidence of a pneumothorax.  There is no evidence of pneumomediastinum.  There are prominent interstitial markings. There is no evidence of free air beneath the hemidiaphragms.  There are degenerative changes in the osseous structures.     Prominent interstitial markings.  No focal consolidation. Electronically signed by: Bhavesh Resendiz MD Date:    04/10/2025 Time:    15:40

## 2025-04-14 ENCOUNTER — OFFICE VISIT (OUTPATIENT)
Dept: PRIMARY CARE CLINIC | Facility: CLINIC | Age: 70
End: 2025-04-14
Payer: MEDICARE

## 2025-04-14 VITALS
HEART RATE: 68 BPM | WEIGHT: 169.56 LBS | DIASTOLIC BLOOD PRESSURE: 82 MMHG | BODY MASS INDEX: 28.21 KG/M2 | OXYGEN SATURATION: 97 % | SYSTOLIC BLOOD PRESSURE: 130 MMHG

## 2025-04-14 DIAGNOSIS — Z71.89 ADVANCED CARE PLANNING/COUNSELING DISCUSSION: ICD-10-CM

## 2025-04-14 DIAGNOSIS — R73.03 PREDIABETES: ICD-10-CM

## 2025-04-14 DIAGNOSIS — J98.8 RESPIRATORY INFECTION: Primary | ICD-10-CM

## 2025-04-14 DIAGNOSIS — Z12.11 COLON CANCER SCREENING: ICD-10-CM

## 2025-04-14 DIAGNOSIS — Z23 IMMUNIZATION DUE: ICD-10-CM

## 2025-04-14 DIAGNOSIS — I70.0 AORTIC ATHEROSCLEROSIS: ICD-10-CM

## 2025-04-14 PROCEDURE — 99213 OFFICE O/P EST LOW 20 MIN: CPT | Mod: HCNC,S$GLB,,

## 2025-04-14 PROCEDURE — 1160F RVW MEDS BY RX/DR IN RCRD: CPT | Mod: HCNC,CPTII,S$GLB,

## 2025-04-14 PROCEDURE — 3044F HG A1C LEVEL LT 7.0%: CPT | Mod: HCNC,CPTII,S$GLB,

## 2025-04-14 PROCEDURE — 1101F PT FALLS ASSESS-DOCD LE1/YR: CPT | Mod: HCNC,CPTII,S$GLB,

## 2025-04-14 PROCEDURE — 99999 PR PBB SHADOW E&M-EST. PATIENT-LVL III: CPT | Mod: PBBFAC,HCNC,,

## 2025-04-14 PROCEDURE — 1126F AMNT PAIN NOTED NONE PRSNT: CPT | Mod: HCNC,CPTII,S$GLB,

## 2025-04-14 PROCEDURE — 3288F FALL RISK ASSESSMENT DOCD: CPT | Mod: HCNC,CPTII,S$GLB,

## 2025-04-14 PROCEDURE — 3075F SYST BP GE 130 - 139MM HG: CPT | Mod: HCNC,CPTII,S$GLB,

## 2025-04-14 PROCEDURE — 3079F DIAST BP 80-89 MM HG: CPT | Mod: HCNC,CPTII,S$GLB,

## 2025-04-14 PROCEDURE — 3008F BODY MASS INDEX DOCD: CPT | Mod: HCNC,CPTII,S$GLB,

## 2025-04-14 PROCEDURE — 1159F MED LIST DOCD IN RCRD: CPT | Mod: HCNC,CPTII,S$GLB,

## 2025-04-15 PROBLEM — J98.8 RESPIRATORY INFECTION: Status: ACTIVE | Noted: 2025-04-15

## 2025-04-15 PROBLEM — Z23 IMMUNIZATION DUE: Status: ACTIVE | Noted: 2025-04-15

## 2025-04-15 PROBLEM — Z71.89 ADVANCED CARE PLANNING/COUNSELING DISCUSSION: Status: ACTIVE | Noted: 2025-04-15

## 2025-04-15 PROBLEM — I70.0 AORTIC ATHEROSCLEROSIS: Status: ACTIVE | Noted: 2025-04-15

## 2025-04-15 NOTE — ASSESSMENT & PLAN NOTE
- Discussed findings on CXR of plaque/calcification on aortic curvature   - Reviewed Lipid panel, noting good cholesterol management on statin therapy  - Reassured pt  - Continue HLD management

## 2025-04-15 NOTE — ASSESSMENT & PLAN NOTE
- Cough reported to be mildly improved along with respiratory function  - Encouraged completion of abx therapy  - Lungs clear to auscultation  - Reassured pt

## 2025-04-15 NOTE — ASSESSMENT & PLAN NOTE
Lab Results   Component Value Date    HGBA1C 5.9 (H) 04/07/2025    HGBA1C 6.0 (H) 08/05/2024    HGBA1C 5.9 (H) 01/24/2024    HGBA1C 5.9 (H) 05/22/2023    HGBA1C 5.9 (H) 01/17/2023   - Glucose and A1C controlled on diet/exercise  - Low sugar/carb diet encouraged  - Aerobic exercise as tolerated, goal 150min/week  - Regular home blood glucose checks  - Complete annual eye & foot examinations

## 2025-04-15 NOTE — PROGRESS NOTES
[facility]    Subjective     Patient Name: Celina Azevedo  YOB: 1955  Patient Age: 69 y.o.  Patient Sex: female  Patient Phone: [phone]  PCP: Krishna Middleton MD  Last PCP Appointment: 4/14/2025    History of Present Illness    CHIEF COMPLAINT:  Ms. Azevedo presents today for follow up after recent pneumonia    HISTORY OF PRESENT ILLNESS:  She sought medical attention last week for pneumonia and reports improvement with current antibiotic treatment of azithromycin and Augmentin. She notes a burning sensation in her intestines as a side effect from azithromycin.    MEDICAL HISTORY:  She has a history of severe throat infection requiring emergency evaluation.    LABS AND IMAGING:  Chest imaging showed calcification in the aortic knob. Cholesterol results were within normal limits and blood sugar was slightly elevated but well controlled.    ROS:  ROS is negative unless otherwise indicated in HPI.         Health Maintenance Summary            Current Care Gaps       Shingles Vaccine (2 of 2) Overdue since 8/27/2022 07/02/2022  Imm Admin: Zoster Recombinant              Influenza Vaccine (1) Overdue since 9/1/2024 03/08/2022  Imm Admin: Influenza (FLUAD) - Quadrivalent - Adjuvanted - PF *Preferred* (65+)              COVID-19 Vaccine (4 - 2024-25 season) Overdue since 9/1/2024 01/04/2022  Imm Admin: COVID-19, MRNA, LN-S, PF (Pfizer) (Purple Cap)    03/11/2021  Imm Admin: COVID-19, MRNA, LN-S, PF (Pfizer) (Purple Cap)    02/18/2021  Imm Admin: COVID-19, MRNA, LN-S, PF (Pfizer) (Purple Cap)              TETANUS VACCINE (Every 10 Years) Never done     No completion history exists for this topic.              Pneumococcal Vaccines (Age 50+) (1 of 2 - PCV) Never done     No completion history exists for this topic.              Colorectal Cancer Screening (View Topic Details) Never done      04/01/2025  Order placed for Ambulatory referral/consult to Endo Procedure  by  normal (ped)... Rey Patrick MA              RSV Vaccine (Age 60+ and Pregnant patients) (1 - Risk 60-74 years 1-dose series) Never done     No completion history exists for this topic.                      Upcoming       Mammogram (Yearly) Next due on 8/16/2025 08/16/2024  Mammo Digital Screening Bilat w/ Yves    09/12/2022  Mammo Digital Screening Bilat w/ Yves              Hemoglobin A1c (Prediabetes) (Yearly) Next due on 4/7/2026 04/07/2025  Hemoglobin A1C External component of Hemoglobin A1C    08/05/2024  Hemoglobin A1C External component of HEMOGLOBIN A1C    01/24/2024  Hemoglobin A1C External component of Hemoglobin A1C    05/22/2023  Hemoglobin A1C External component of Hemoglobin A1C    01/17/2023  Hemoglobin A1C External component of Hemoglobin A1C     Only the first 5 history entries have been loaded, but more history exists.            DEXA Scan (Every 5 Years) Next due on 9/16/2029 09/16/2024  DXA Bone Density Axial Skeleton 1 or more sites    09/12/2022  DXA Bone Density Spine And Hip              Lipid Panel (Every 5 Years) Next due on 4/7/2030 04/07/2025  Lipid Panel    01/24/2024  Cholesterol Total component of Lipid Panel    03/10/2023  Cholesterol Total component of Lipid Panel    08/03/2022  Cholesterol Total component of Lipid Panel    03/08/2022  Cholesterol Total component of Lipid Panel      Only the first 5 history entries have been loaded, but more history exists.                      Completed or No Longer Recommended       Hepatitis C Screening  Completed      12/31/2022  Hepatitis C Ab component of Hepatitis C Antibody    03/08/2022  Hepatitis C Ab component of Hepatitis C Antibody                            Prior to Admission medications    Medication Sig Start Date End Date Taking? Authorizing Provider   albuterol (VENTOLIN HFA) 90 mcg/actuation inhaler Inhale 2 puffs into the lungs every 6 (six) hours as needed for Wheezing. Rescue 4/10/25  Yes Kimi Guzman, MICHAELA    amoxicillin-clavulanate 875-125mg (AUGMENTIN) 875-125 mg per tablet Take 1 tablet by mouth every 12 (twelve) hours. for 7 days 4/10/25 4/17/25 Yes Kimi Guzman NP   atorvastatin (LIPITOR) 20 MG tablet Take 1 tablet (20 mg total) by mouth once daily. 9/16/24 9/16/25 Yes Michelle Alarcon MD   azithromycin (Z-ARIE) 250 MG tablet Take 2 tablets by mouth on day 1; Take 1 tablet by mouth on days 2-5 4/10/25 4/15/25 Yes Kimi Guzman NP   valACYclovir (VALTREX) 1000 MG tablet Take 1 tablet (1,000 mg total) by mouth 2 (two) times daily. for 7 days 3/11/24 3/18/24  Rowena Toscano NP       OBJECTIVE:     Vitals:    04/14/25 1600   BP: 130/82   BP Location: Right arm   Patient Position: Sitting   Pulse: 68   SpO2: 97%   Weight: 76.9 kg (169 lb 8.5 oz)       Body mass index is 28.21 kg/m².     PHYSICAL EXAM  GEN - A+OX4, NAD   HEENT - PERRL, EOMI,   Neck - No thyromegaly or cervical LAD.   CV - RRR, no m/r  Chest - CTAB, no wheezing or rhonchi, cough present  Abd - S/NT/ND/+BS.   Ext - 2+BDP and radial pulses. No BLE edema.  MSK - normal ROM, no tenderness  Neuro - 5/5 BUE and BLE strength.  LN - No appreciated.  Skin - No rash.   Psych - anxiety    LABS  Lab Results   Component Value Date    WBC 8.18 04/07/2025    HGB 12.3 04/07/2025    HCT 40.1 04/07/2025    MCV 93 04/07/2025     04/07/2025         CMP  Sodium   Date Value Ref Range Status   04/07/2025 141 136 - 145 mmol/L Final   08/05/2024 141 136 - 145 mmol/L Final     Potassium   Date Value Ref Range Status   04/07/2025 4.5 3.5 - 5.1 mmol/L Final   08/05/2024 4.7 3.5 - 5.1 mmol/L Final     Chloride   Date Value Ref Range Status   04/07/2025 106 95 - 110 mmol/L Final   08/05/2024 104 95 - 110 mmol/L Final     CO2   Date Value Ref Range Status   04/07/2025 27 23 - 29 mmol/L Final   08/05/2024 29 23 - 29 mmol/L Final     Glucose   Date Value Ref Range Status   08/05/2024 100 70 - 110 mg/dL Final     BUN   Date Value Ref Range Status   04/07/2025 13 8 - 23  mg/dL Final     Creatinine   Date Value Ref Range Status   04/07/2025 0.8 0.5 - 1.4 mg/dL Final     Calcium   Date Value Ref Range Status   04/07/2025 9.5 8.7 - 10.5 mg/dL Final   08/05/2024 9.9 8.7 - 10.5 mg/dL Final     Total Protein   Date Value Ref Range Status   01/24/2024 7.0 6.0 - 8.4 g/dL Final     Albumin   Date Value Ref Range Status   04/07/2025 3.4 (L) 3.5 - 5.2 g/dL Final   01/24/2024 3.6 3.5 - 5.2 g/dL Final     Total Bilirubin   Date Value Ref Range Status   01/24/2024 0.4 0.1 - 1.0 mg/dL Final     Comment:     For infants and newborns, interpretation of results should be based  on gestational age, weight and in agreement with clinical  observations.    Premature Infant recommended reference ranges:  Up to 24 hours.............<8.0 mg/dL  Up to 48 hours............<12.0 mg/dL  3-5 days..................<15.0 mg/dL  6-29 days.................<15.0 mg/dL       Bilirubin Total   Date Value Ref Range Status   04/07/2025 0.3 0.1 - 1.0 mg/dL Final     Comment:     For infants and newborns, interpretation of results should be based   on gestational age, weight and in agreement with clinical   observations.    Premature Infant recommended reference ranges:   0-24 hours:  <8.0 mg/dL   24-48 hours: <12.0 mg/dL   3-5 days:    <15.0 mg/dL   6-29 days:   <15.0 mg/dL     Alkaline Phosphatase   Date Value Ref Range Status   01/24/2024 67 55 - 135 U/L Final     ALP   Date Value Ref Range Status   04/07/2025 70 40 - 150 unit/L Final     AST   Date Value Ref Range Status   04/07/2025 20 11 - 45 unit/L Final   01/24/2024 20 10 - 40 U/L Final     ALT   Date Value Ref Range Status   04/07/2025 17 10 - 44 unit/L Final   01/24/2024 17 10 - 44 U/L Final     Anion Gap   Date Value Ref Range Status   04/07/2025 8 8 - 16 mmol/L Final     eGFR   Date Value Ref Range Status   04/07/2025 >60 >60 mL/min/1.73/m2 Final     Comment:     Estimated GFR calculated using the CKD-EPI creatinine (2021) equation.   08/05/2024 >60.0 >60  mL/min/1.73 m^2 Final       ASSESSMENT & PLAN:   Ms. Celina Azevedo is a 69 y.o. female who was seen in clinic today for scheduled follow up. Reviewed chest XR findings, noting calcification in aortic knob. Cholesterol levels and current cholesterol medication are well-controlled. Blood sugar levels slightly elevated but overall good control based on trends. Considered recent pneumonia diagnosis and treatment with azithromycin and Augmentin for community-acquired pneumonia. Assessed symptms, pt improving overall; clinic follow up to be scheduled in 3 months.        1. Respiratory infection  Overview:  - Acute persistent cough started on 4/07/25  - Pt presented to Urgent care on 4/10/25  - Treated for CAP with Azithromycin and Augmentin    Assessment & Plan:  - Cough reported to be mildly improved along with respiratory function  - Encouraged completion of abx therapy  - Lungs clear to auscultation  - Reassured pt      2. Aortic atherosclerosis  Overview:  - CXR on 4/10/25 notable for calcification of the aortic knob  - HLD managed with atorvastatin 20mg qhs     Assessment & Plan:  - Discussed findings on CXR of plaque/calcification on aortic curvature   - Reviewed Lipid panel, noting good cholesterol management on statin therapy  - Reassured pt  - Continue HLD management      3. Prediabetes  Overview:  - Prediabetes managed with diet and exercise  - Previously prescribed metformin but never started    Assessment & Plan:  Lab Results   Component Value Date    HGBA1C 5.9 (H) 04/07/2025    HGBA1C 6.0 (H) 08/05/2024    HGBA1C 5.9 (H) 01/24/2024    HGBA1C 5.9 (H) 05/22/2023    HGBA1C 5.9 (H) 01/17/2023   - Glucose and A1C controlled on diet/exercise  - Low sugar/carb diet encouraged  - Aerobic exercise as tolerated, goal 150min/week  - Regular home blood glucose checks  - Complete annual eye & foot examinations      4. Immunization due  Overview:  - Acknowledged the need for future vaccinations  T/dap  Covid  booster  Pneumococcal  Influenza  RSV  Shingles    Assessment & Plan:  - Recommended future immunizations  - Will reassess healthcare gaps and discuss pertinent vaccinations at follow up visit        5. Advanced care planning/counseling discussion  Overview:  - No HCPOA or ACP documentation on record    Assessment & Plan:  - Initiated the process of voluntary advance care planning.   - Explained the importance of this process to the patient.    - Reviewed the importance of designating a Health Care Power of  (HCPOA) should they become sick and lose decision-making capacity.   - Spent a total time of 5 minutes discussing this issue with the patient.      6. Colon cancer screening    - Colonoscopy previously ordered; pt unable to be reached via telephone  - Encouraged pt to follow up with phone call to schedule  - All questions answered     Follow up in about 3 months (around 7/14/2025).     All questions answered. Pt to call/message the Primary Clinic for any additional concerns    I spent a total of 26 minutes on the day of the visit.      This includes face to face time and non-face to face time preparing to see the patient (eg, review of tests), obtaining and/or reviewing separately obtained history, documenting clinical information in the electronic or other health record, independently interpreting results and communicating results to the patient/family/caregiver, or care coordinator.       Krishna Middleton MD  [facility] - [facility]    This note was generated with the assistance of ambient listening technology. Verbal consent was obtained by the patient and accompanying visitor(s) for the recording of patient appointment to facilitate this note. I attest to having reviewed and edited the generated note for accuracy, though some syntax or spelling errors may persist. Please contact the author of this note for any clarification.

## 2025-04-15 NOTE — ASSESSMENT & PLAN NOTE
- Recommended future immunizations  - Will reassess healthcare gaps and discuss pertinent vaccinations at follow up visit

## 2025-04-15 NOTE — ASSESSMENT & PLAN NOTE
- Initiated the process of voluntary advance care planning.   - Explained the importance of this process to the patient.    - Reviewed the importance of designating a Health Care Power of  (HCPOA) should they become sick and lose decision-making capacity.   - Spent a total time of 5 minutes discussing this issue with the patient.

## 2025-06-17 ENCOUNTER — TELEPHONE (OUTPATIENT)
Dept: ENDOSCOPY | Facility: HOSPITAL | Age: 70
End: 2025-06-17

## 2025-06-17 NOTE — TELEPHONE ENCOUNTER
Dear Referring Provider and Staff,    The Endoscopy Scheduling Department has made 2+ attempts to reach the patient for scheduling their Colonoscopy. All final attempts have been made for this referral, if the referring provider would like the patient to receive endoscopic care, please confirm with the patient whether they would like to proceed. If so, then submit a new referral and inform the Pt that the Endoscopy Scheduling department will be contacting them to schedule their procedure.      Thank you,    Endoscopy Scheduling Department

## 2025-06-17 NOTE — TELEPHONE ENCOUNTER
Endoscopy Scheduling Department  [facility] [address] [place] [address]  Take the [facility] Elevators to 4th Floor Endoscopy Lab      Date: 06/17/2025      Medical Record # [id number]        Dear  Celina Azevedo      An order for the following procedure(s) Colonoscopy was placed for you by   Michelle Alarcon MD.    Since multiple attempts have been made to get in touch with you, this is the last notification. Please call the scheduling nurse to schedule this procedure as soon as possible.    If you have already scheduled this appointment, please disregard this letter. If you would like to cancel this request, please call the number listed below.     Sincerely,        Endoscopy Scheduling Department [phone]        Comments: Office hours are Monday through Friday 8-430p.

## 2025-07-22 ENCOUNTER — TELEPHONE (OUTPATIENT)
Dept: PRIMARY CARE CLINIC | Facility: CLINIC | Age: 70
End: 2025-07-22
Payer: MEDICARE

## 2025-07-23 ENCOUNTER — OFFICE VISIT (OUTPATIENT)
Dept: PRIMARY CARE CLINIC | Facility: CLINIC | Age: 70
End: 2025-07-23
Payer: MEDICARE

## 2025-07-23 VITALS
BODY MASS INDEX: 28.02 KG/M2 | HEART RATE: 62 BPM | WEIGHT: 168.19 LBS | SYSTOLIC BLOOD PRESSURE: 130 MMHG | DIASTOLIC BLOOD PRESSURE: 70 MMHG | HEIGHT: 65 IN | OXYGEN SATURATION: 96 %

## 2025-07-23 DIAGNOSIS — Z12.11 ENCOUNTER FOR SCREENING FOR MALIGNANT NEOPLASM OF COLON: Primary | ICD-10-CM

## 2025-07-23 DIAGNOSIS — R73.03 PREDIABETES: ICD-10-CM

## 2025-07-23 DIAGNOSIS — E78.2 MIXED HYPERLIPIDEMIA: ICD-10-CM

## 2025-07-23 PROCEDURE — 1159F MED LIST DOCD IN RCRD: CPT | Mod: CPTII,HCNC,S$GLB,

## 2025-07-23 PROCEDURE — 3075F SYST BP GE 130 - 139MM HG: CPT | Mod: CPTII,HCNC,S$GLB,

## 2025-07-23 PROCEDURE — 3288F FALL RISK ASSESSMENT DOCD: CPT | Mod: CPTII,HCNC,S$GLB,

## 2025-07-23 PROCEDURE — 1160F RVW MEDS BY RX/DR IN RCRD: CPT | Mod: CPTII,HCNC,S$GLB,

## 2025-07-23 PROCEDURE — 3078F DIAST BP <80 MM HG: CPT | Mod: CPTII,HCNC,S$GLB,

## 2025-07-23 PROCEDURE — 1101F PT FALLS ASSESS-DOCD LE1/YR: CPT | Mod: CPTII,HCNC,S$GLB,

## 2025-07-23 PROCEDURE — 1126F AMNT PAIN NOTED NONE PRSNT: CPT | Mod: CPTII,HCNC,S$GLB,

## 2025-07-23 PROCEDURE — 99214 OFFICE O/P EST MOD 30 MIN: CPT | Mod: HCNC,S$GLB,,

## 2025-07-23 PROCEDURE — 3008F BODY MASS INDEX DOCD: CPT | Mod: CPTII,HCNC,S$GLB,

## 2025-07-23 PROCEDURE — 3044F HG A1C LEVEL LT 7.0%: CPT | Mod: CPTII,HCNC,S$GLB,

## 2025-07-23 PROCEDURE — 99999 PR PBB SHADOW E&M-EST. PATIENT-LVL III: CPT | Mod: PBBFAC,HCNC,,

## 2025-07-23 NOTE — PROGRESS NOTES
[facility]    Subjective     Patient Name: Celina Azevedo  YOB: 1955  Patient Age: 69 y.o.  Patient Sex: female  Patient Phone: [phone]  PCP: Krishna Middleton MD  Last PCP Appointment: 7/23/2025    Ms. Celina Azevedo is a 69 y.o. female who was seen in clinic today for scheduled follow up.    History of Present Illness    CHIEF COMPLAINT:  Ms. Azevedo presents today for follow up and review of lab results.     PRE-DIABETES:  She reports concerns about elevated glucose levels identified by a previous physician. Recent A1C results of 5.9, 6.6, 5.9, and 6.0 indicate pre-diabetic range (normal <5.7). She acknowledges consuming yuca and starchy foods. She denies family history of diabetes and specific symptoms of hyperglycemia. She expresses interest in monitoring blood sugar and preventing progression to diabetes.    GASTRO:  She expresses need to complete routine screen for colon cancer. No previous documentation of endoscopy. Pt reports no acute symptoms. Normal BMs.     LABS:  Cholesterol profile shows low LDL and normal HDL. Liver and kidney function tests are normal.    CURRENT MEDICATIONS:  She continues atorvastatin 10 mg daily which appears to be effectively managing cholesterol levels.    ROS:  ROS is negative unless otherwise indicated in HPI.         Health Maintenance Summary            Current Care Gaps       Shingles Vaccine (2 of 2) Overdue since 8/27/2022 07/02/2022  Imm Admin: Zoster Recombinant              COVID-19 Vaccine (4 - 2024-25 season) Overdue since 9/1/2024 01/04/2022  Imm Admin: COVID-19, MRNA, LN-S, PF (Pfizer) (Purple Cap)    03/11/2021  Imm Admin: COVID-19, MRNA, LN-S, PF (Pfizer) (Purple Cap)    02/18/2021  Imm Admin: COVID-19, MRNA, LN-S, PF (Pfizer) (Purple Cap)              TETANUS VACCINE (Every 10 Years) Never done     No completion history exists for this topic.              Pneumococcal Vaccines (Age 50+) (1 of 2 - PCV) Never done     No  completion history exists for this topic.              RSV Vaccine (Age 60+ and Pregnant patients) (1 - Risk 60-74 years 1-dose series) Never done     No completion history exists for this topic.              Mammogram (Yearly) Due soon on 8/16/2025 08/16/2024  Mammo Digital Screening Bilat w/ [facility]    09/12/2022  Mammo Digital Screening Bilat w/ [facility]                      Awaiting Completion       Colorectal Cancer Screening (View Topic Details) Order placed this encounter      07/23/2025  Order placed for Ambulatory referral/consult to Endo Procedure  by Krishna Middleton MD                      Upcoming       Influenza Vaccine (1) Next due on 9/1/2025 03/08/2022  Imm Admin: Influenza (FLUAD) - Quadrivalent - Adjuvanted - PF *Preferred* (65+)              Hemoglobin A1c (Prediabetes) (Yearly) Next due on 4/7/2026 04/07/2025  Hemoglobin A1C External component of Hemoglobin A1C    08/05/2024  Hemoglobin A1C External component of HEMOGLOBIN A1C    01/24/2024  Hemoglobin A1C External component of Hemoglobin A1C    05/22/2023  Hemoglobin A1C External component of Hemoglobin A1C    01/17/2023  Hemoglobin A1C External component of Hemoglobin A1C     Only the first 5 history entries have been loaded, but more history exists.            High Dose Statin (Yearly) Next due on 7/23/2026 07/23/2025  Registry Metric: Last Current Statin Reviewed Date    09/16/2024  Registry Metric: Last Current Statin Order Date              DEXA Scan (Every 5 Years) Next due on 9/16/2029 09/16/2024  DXA Bone Density Axial Skeleton 1 or more sites    09/12/2022  DXA Bone Density Spine And Hip              Lipid Panel (Every 5 Years) Next due on 4/7/2030 04/07/2025  Lipid Panel    01/24/2024  Cholesterol Total component of Lipid Panel    03/10/2023  Cholesterol Total component of Lipid Panel    08/03/2022  Cholesterol Total component of Lipid Panel    03/08/2022  Cholesterol Total component of Lipid Panel       "Only the first 5 history entries have been loaded, but more history exists.                      Completed or No Longer Recommended       Hepatitis C Screening  Completed      12/31/2022  Hepatitis C Ab component of Hepatitis C Antibody    03/08/2022  Hepatitis C Ab component of Hepatitis C Antibody                            Prior to Admission medications    Medication Sig Start Date End Date Taking? Authorizing Provider   atorvastatin (LIPITOR) 20 MG tablet Take 1 tablet (20 mg total) by mouth once daily. 9/16/24 9/16/25 Yes Michelle Alarcon MD   albuterol (VENTOLIN HFA) 90 mcg/actuation inhaler Inhale 2 puffs into the lungs every 6 (six) hours as needed for Wheezing. Rescue  Patient not taking: Reported on 7/23/2025 4/10/25   Kimi Guzman, MELYSSAP-C   valACYclovir (VALTREX) 1000 MG tablet Take 1 tablet (1,000 mg total) by mouth 2 (two) times daily. for 7 days 3/11/24 7/23/25  Rowena Toscano, NP       OBJECTIVE:     Vitals:    07/23/25 1050   BP: 130/70   BP Location: Left arm   Patient Position: Sitting   Pulse: 62   SpO2: 96%   Weight: 76.3 kg (168 lb 3.4 oz)   Height: 5' 5" (1.651 m)       Body mass index is 27.99 kg/m².     PHYSICAL EXAM  GEN - A+OX4, NAD   HEENT - PERRL, EOMI  Neck - No LAD.   CV - RRR, no m/r   Chest - CTAB, no wheezing or rhonchi  Abd - S/NT/ND/+BS.   Ext - 2+BDP and radial pulses. No BLE edema.  MSK - normal ROM, no tenderness  Neuro - 5/5 BUE and BLE strength.  LN - No LAD appreciated.  Skin - No rash.     LABS  Lab Results   Component Value Date    WBC 8.18 04/07/2025    HGB 12.3 04/07/2025    HCT 40.1 04/07/2025    MCV 93 04/07/2025     04/07/2025         CMP  Sodium   Date Value Ref Range Status   04/07/2025 141 136 - 145 mmol/L Final   08/05/2024 141 136 - 145 mmol/L Final     Potassium   Date Value Ref Range Status   04/07/2025 4.5 3.5 - 5.1 mmol/L Final   08/05/2024 4.7 3.5 - 5.1 mmol/L Final     Chloride   Date Value Ref Range Status   04/07/2025 106 95 - 110 mmol/L Final "   08/05/2024 104 95 - 110 mmol/L Final     CO2   Date Value Ref Range Status   04/07/2025 27 23 - 29 mmol/L Final   08/05/2024 29 23 - 29 mmol/L Final     Glucose   Date Value Ref Range Status   04/07/2025 96 70 - 110 mg/dL Final   08/05/2024 100 70 - 110 mg/dL Final     BUN   Date Value Ref Range Status   04/07/2025 13 8 - 23 mg/dL Final     Creatinine   Date Value Ref Range Status   04/07/2025 0.8 0.5 - 1.4 mg/dL Final     Calcium   Date Value Ref Range Status   04/07/2025 9.5 8.7 - 10.5 mg/dL Final   08/05/2024 9.9 8.7 - 10.5 mg/dL Final     Protein Total   Date Value Ref Range Status   04/07/2025 7.2 6.0 - 8.4 gm/dL Final     Total Protein   Date Value Ref Range Status   01/24/2024 7.0 6.0 - 8.4 g/dL Final     Albumin   Date Value Ref Range Status   04/07/2025 3.4 (L) 3.5 - 5.2 g/dL Final   01/24/2024 3.6 3.5 - 5.2 g/dL Final     Total Bilirubin   Date Value Ref Range Status   01/24/2024 0.4 0.1 - 1.0 mg/dL Final     Comment:     For infants and newborns, interpretation of results should be based  on gestational age, weight and in agreement with clinical  observations.    Premature Infant recommended reference ranges:  Up to 24 hours.............<8.0 mg/dL  Up to 48 hours............<12.0 mg/dL  3-5 days..................<15.0 mg/dL  6-29 days.................<15.0 mg/dL       Bilirubin Total   Date Value Ref Range Status   04/07/2025 0.3 0.1 - 1.0 mg/dL Final     Comment:     For infants and newborns, interpretation of results should be based   on gestational age, weight and in agreement with clinical   observations.    Premature Infant recommended reference ranges:   0-24 hours:  <8.0 mg/dL   24-48 hours: <12.0 mg/dL   3-5 days:    <15.0 mg/dL   6-29 days:   <15.0 mg/dL     Alkaline Phosphatase   Date Value Ref Range Status   01/24/2024 67 55 - 135 U/L Final     ALP   Date Value Ref Range Status   04/07/2025 70 40 - 150 unit/L Final     AST   Date Value Ref Range Status   04/07/2025 20 11 - 45 unit/L Final    01/24/2024 20 10 - 40 U/L Final     ALT   Date Value Ref Range Status   04/07/2025 17 10 - 44 unit/L Final   01/24/2024 17 10 - 44 U/L Final     Anion Gap   Date Value Ref Range Status   04/07/2025 8 8 - 16 mmol/L Final     eGFR   Date Value Ref Range Status   04/07/2025 >60 >60 mL/min/1.73/m2 Final     Comment:     Estimated GFR calculated using the CKD-EPI creatinine (2021) equation.   08/05/2024 >60.0 >60 mL/min/1.73 m^2 Final       ASSESSMENT & PLAN:   Ms. Celina Azevedo is a 69 y.o. female who was seen in clinic today for scheduled follow up. Reviewed A1C level, noting stability in pre-diabetic range (5.9-6.0), explained normal ranges and correlation with average glucose levels. Evaluated overall health status based on recent lab work, including cholesterol, liver function, and renal function. Assessed need for preventive screenings, including mammogram and DEXA scan. Discussed colonoscopy recommendation, weighing risks and benefits given age and family history, including benefits for polyp removal and cancer prevention, and explained differences between colonoscopy and stool-based screening tests. Considered concerns about cognitive changes and potential need for further evaluation. Discussed dietary impact on blood sugar, particularly high-sugar/starch foods. Will order colonoscopy and clinic follow up in 3 months.         1. Encounter for screening for malignant neoplasm of colon  -     Ambulatory referral/consult to Endo Procedure ; Future; Expected date: 07/24/2025    2. Prediabetes  Overview:  - Prediabetes managed with diet and exercise  - Previously prescribed metformin but never started    Assessment & Plan:  - Monitored Hemoglobin A1C level which have remained stable in the prediabetes range (5.9, 6.6, 5.9, and 6).  - Discussed strategies to prevent progression to diabetes, including dietary modifications with emphasis on avoiding high-sugar and high-starch foods.  - Will consider starting  medication if dietary measures alone are insufficient to maintain current A1C levels.      3. Mixed hyperlipidemia  Overview:  - HLD managed with atorvastatin 20mg qd    Assessment & Plan:  Lab Results   Component Value Date    CHOL 147 04/07/2025    HDL 54 04/07/2025    LDLCALC 77.4 04/07/2025    TRIG 78 04/07/2025     The 10-year ASCVD risk score (Gigi VILLAFUERTE, et al., 2019) is: 10.1%    Values used to calculate the score:      Age: 69 years      Sex: Female      Is Non- : No      Diabetic: No      Tobacco smoker: No      Systolic Blood Pressure: 130 mmHg      Is BP treated: No      HDL Cholesterol: 54 mg/dL      Total Cholesterol: 147 mg/dL  - Cholesterol/triglycerides well managed  - Continue statin therapy and low cholesterol diet  - Aerobic exercise as tolerated, goal 150min/week  - Trend future lipid panel.           Follow up in about 3 months (around 10/23/2025).     All questions answered. Pt to call/message the Primary Clinic for any additional concerns    I spent a total of 32 minutes on the day of the visit.      This includes face to face time and non-face to face time preparing to see the patient (eg, review of tests), obtaining and/or reviewing separately obtained history, documenting clinical information in the electronic or other health record, independently interpreting results and communicating results to the patient/family/caregiver, or care coordinator.       Krishna Middleton MD  [facility]    This note was generated with the assistance of ambient listening technology. Verbal consent was obtained by the patient and accompanying visitor(s) for the recording of patient appointment to facilitate this note. I attest to having reviewed and edited the generated note for accuracy, though some syntax or spelling errors may persist. Please contact the author of this note for any clarification.

## 2025-07-24 NOTE — ASSESSMENT & PLAN NOTE
Lab Results   Component Value Date    CHOL 147 04/07/2025    HDL 54 04/07/2025    LDLCALC 77.4 04/07/2025    TRIG 78 04/07/2025     The 10-year ASCVD risk score (Gigi VILLAFUERTE, et al., 2019) is: 10.1%    Values used to calculate the score:      Age: 69 years      Sex: Female      Is Non- : No      Diabetic: No      Tobacco smoker: No      Systolic Blood Pressure: 130 mmHg      Is BP treated: No      HDL Cholesterol: 54 mg/dL      Total Cholesterol: 147 mg/dL  - Cholesterol/triglycerides well managed  - Continue statin therapy and low cholesterol diet  - Aerobic exercise as tolerated, goal 150min/week  - Trend future lipid panel.

## 2025-07-24 NOTE — ASSESSMENT & PLAN NOTE
- Monitored Hemoglobin A1C level which have remained stable in the prediabetes range (5.9, 6.6, 5.9, and 6).  - Discussed strategies to prevent progression to diabetes, including dietary modifications with emphasis on avoiding high-sugar and high-starch foods.  - Will consider starting medication if dietary measures alone are insufficient to maintain current A1C levels.

## 2025-07-28 ENCOUNTER — TELEPHONE (OUTPATIENT)
Dept: ENDOSCOPY | Facility: HOSPITAL | Age: 70
End: 2025-07-28
Payer: MEDICARE

## 2025-07-28 NOTE — TELEPHONE ENCOUNTER
Contacted the patient to schedule an endoscopy procedure(s) Colonoscopy . Used language line ID:[id number]. Phone call ended per patient. Called 3xs. A portal message will be sent to patient to informed her when is ready to schedule her colonoscopy she can give Endoscopy Scheduling Department a Call back.

## 2025-08-08 ENCOUNTER — CLINICAL SUPPORT (OUTPATIENT)
Dept: ENDOSCOPY | Facility: HOSPITAL | Age: 70
End: 2025-08-08
Payer: MEDICARE

## 2025-08-08 ENCOUNTER — TELEPHONE (OUTPATIENT)
Dept: ENDOSCOPY | Facility: HOSPITAL | Age: 70
End: 2025-08-08

## 2025-08-08 VITALS — WEIGHT: 167 LBS | BODY MASS INDEX: 27.82 KG/M2 | HEIGHT: 65 IN

## 2025-08-08 DIAGNOSIS — Z12.11 ENCOUNTER FOR SCREENING FOR MALIGNANT NEOPLASM OF COLON: ICD-10-CM

## 2025-08-08 DIAGNOSIS — Z12.11 SPECIAL SCREENING FOR MALIGNANT NEOPLASMS, COLON: Primary | ICD-10-CM

## 2025-08-08 NOTE — TELEPHONE ENCOUNTER
Patient is scheduled for a Colonoscopy on 10/1/25 with Dr. ILA Pyle  Referral for procedure from PAT appointment

## (undated) DEVICE — PAD COLD THERAPY KNEE WRAP ON

## (undated) DEVICE — TUBE SET INFLOW/OUTFLOW

## (undated) DEVICE — BLADE SHAVER 4.5 6/BX

## (undated) DEVICE — TOURNIQUET SB QC DP 34X4IN

## (undated) DEVICE — Device

## (undated) DEVICE — GAUZE SPONGE 4X4 12PLY

## (undated) DEVICE — GOWN SMARTGOWN LVL4 X-LONG XL

## (undated) DEVICE — DRAPE STERI INSTRUMENT 1018

## (undated) DEVICE — SEE MEDLINE ITEM 157169

## (undated) DEVICE — SUT 3-0 ETHILON 18 FS-1

## (undated) DEVICE — UNDERGLOVES BIOGEL PI SIZE 8.5

## (undated) DEVICE — APPLICATOR CHLORAPREP ORN 26ML

## (undated) DEVICE — DRESSING XEROFORM FOIL PK 1X8

## (undated) DEVICE — PAD ABD 8X10 STERILE

## (undated) DEVICE — BANDAGE MATRIX HK LOOP 6IN 5YD

## (undated) DEVICE — BLADE MICRO REC. LARGE CROSS C

## (undated) DEVICE — SOL IRR NACL .9% 3000ML

## (undated) DEVICE — PROBE ARTHSCP EDGE ENERGY 50

## (undated) DEVICE — SOL 9P NACL IRR PIC IL

## (undated) DEVICE — GLOVE BIOGEL SKINSENSE PI 8.0